# Patient Record
Sex: MALE | Race: WHITE | NOT HISPANIC OR LATINO | Employment: OTHER | URBAN - NONMETROPOLITAN AREA
[De-identification: names, ages, dates, MRNs, and addresses within clinical notes are randomized per-mention and may not be internally consistent; named-entity substitution may affect disease eponyms.]

---

## 2017-02-01 ENCOUNTER — TRANSCRIBE ORDERS (OUTPATIENT)
Dept: LAB | Facility: MEDICAL CENTER | Age: 82
End: 2017-02-01

## 2017-02-01 ENCOUNTER — APPOINTMENT (OUTPATIENT)
Dept: LAB | Facility: MEDICAL CENTER | Age: 82
End: 2017-02-01
Payer: MEDICARE

## 2017-02-01 DIAGNOSIS — I10 UNSPECIFIED ESSENTIAL HYPERTENSION: ICD-10-CM

## 2017-02-01 DIAGNOSIS — E78.5 HYPERLIPIDEMIA, UNSPECIFIED HYPERLIPIDEMIA TYPE: ICD-10-CM

## 2017-02-01 DIAGNOSIS — E55.9 VITAMIN D DEFICIENCY DISEASE: ICD-10-CM

## 2017-02-01 DIAGNOSIS — E03.9 HYPOTHYROIDISM, UNSPECIFIED TYPE: ICD-10-CM

## 2017-02-01 DIAGNOSIS — I10 UNSPECIFIED ESSENTIAL HYPERTENSION: Primary | ICD-10-CM

## 2017-02-01 LAB
25(OH)D3 SERPL-MCNC: 44 NG/ML (ref 30–100)
ALBUMIN SERPL BCP-MCNC: 3.7 G/DL (ref 3.5–5)
ALP SERPL-CCNC: 68 U/L (ref 46–116)
ALT SERPL W P-5'-P-CCNC: 23 U/L (ref 12–78)
ANION GAP SERPL CALCULATED.3IONS-SCNC: 9 MMOL/L (ref 4–13)
AST SERPL W P-5'-P-CCNC: 12 U/L (ref 5–45)
BASOPHILS # BLD AUTO: 0.03 THOUSANDS/ΜL (ref 0–0.1)
BASOPHILS NFR BLD AUTO: 0 % (ref 0–1)
BILIRUB SERPL-MCNC: 0.72 MG/DL (ref 0.2–1)
BUN SERPL-MCNC: 11 MG/DL (ref 5–25)
CALCIUM SERPL-MCNC: 9 MG/DL (ref 8.3–10.1)
CHLORIDE SERPL-SCNC: 107 MMOL/L (ref 100–108)
CHOLEST SERPL-MCNC: 136 MG/DL (ref 50–200)
CO2 SERPL-SCNC: 26 MMOL/L (ref 21–32)
CREAT SERPL-MCNC: 0.99 MG/DL (ref 0.6–1.3)
EOSINOPHIL # BLD AUTO: 0.81 THOUSAND/ΜL (ref 0–0.61)
EOSINOPHIL NFR BLD AUTO: 12 % (ref 0–6)
ERYTHROCYTE [DISTWIDTH] IN BLOOD BY AUTOMATED COUNT: 13.1 % (ref 11.6–15.1)
EST. AVERAGE GLUCOSE BLD GHB EST-MCNC: 117 MG/DL
GFR SERPL CREATININE-BSD FRML MDRD: >60 ML/MIN/1.73SQ M
GLUCOSE SERPL-MCNC: 93 MG/DL (ref 65–140)
HBA1C MFR BLD: 5.7 % (ref 4.2–6.3)
HCT VFR BLD AUTO: 43.9 % (ref 36.5–49.3)
HDLC SERPL-MCNC: 57 MG/DL (ref 40–60)
HGB BLD-MCNC: 15 G/DL (ref 12–17)
LDLC SERPL CALC-MCNC: 61 MG/DL (ref 0–100)
LYMPHOCYTES # BLD AUTO: 2.55 THOUSANDS/ΜL (ref 0.6–4.47)
LYMPHOCYTES NFR BLD AUTO: 37 % (ref 14–44)
MCH RBC QN AUTO: 29.9 PG (ref 26.8–34.3)
MCHC RBC AUTO-ENTMCNC: 34.2 G/DL (ref 31.4–37.4)
MCV RBC AUTO: 88 FL (ref 82–98)
MONOCYTES # BLD AUTO: 0.62 THOUSAND/ΜL (ref 0.17–1.22)
MONOCYTES NFR BLD AUTO: 9 % (ref 4–12)
NEUTROPHILS # BLD AUTO: 2.86 THOUSANDS/ΜL (ref 1.85–7.62)
NEUTS SEG NFR BLD AUTO: 42 % (ref 43–75)
NRBC BLD AUTO-RTO: 0 /100 WBCS
PLATELET # BLD AUTO: 204 THOUSANDS/UL (ref 149–390)
PMV BLD AUTO: 11.5 FL (ref 8.9–12.7)
POTASSIUM SERPL-SCNC: 3.8 MMOL/L (ref 3.5–5.3)
PROT SERPL-MCNC: 7.7 G/DL (ref 6.4–8.2)
RBC # BLD AUTO: 5.02 MILLION/UL (ref 3.88–5.62)
SODIUM SERPL-SCNC: 142 MMOL/L (ref 136–145)
TRIGL SERPL-MCNC: 91 MG/DL
TSH SERPL DL<=0.05 MIU/L-ACNC: 4.39 UIU/ML (ref 0.36–3.74)
WBC # BLD AUTO: 6.88 THOUSAND/UL (ref 4.31–10.16)

## 2017-02-01 PROCEDURE — 80061 LIPID PANEL: CPT

## 2017-02-01 PROCEDURE — 84443 ASSAY THYROID STIM HORMONE: CPT

## 2017-02-01 PROCEDURE — 80053 COMPREHEN METABOLIC PANEL: CPT

## 2017-02-01 PROCEDURE — 85025 COMPLETE CBC W/AUTO DIFF WBC: CPT

## 2017-02-01 PROCEDURE — 83036 HEMOGLOBIN GLYCOSYLATED A1C: CPT

## 2017-02-01 PROCEDURE — 36415 COLL VENOUS BLD VENIPUNCTURE: CPT

## 2017-02-01 PROCEDURE — 82306 VITAMIN D 25 HYDROXY: CPT

## 2017-07-15 ENCOUNTER — HOSPITAL ENCOUNTER (EMERGENCY)
Facility: HOSPITAL | Age: 82
Discharge: HOME | End: 2017-07-15
Attending: EMERGENCY MEDICINE | Admitting: EMERGENCY MEDICINE
Payer: MEDICARE

## 2017-07-15 ENCOUNTER — APPOINTMENT (EMERGENCY)
Dept: RADIOLOGY | Facility: HOSPITAL | Age: 82
End: 2017-07-15
Payer: MEDICARE

## 2017-07-15 VITALS
RESPIRATION RATE: 17 BRPM | WEIGHT: 243.17 LBS | BODY MASS INDEX: 36.02 KG/M2 | SYSTOLIC BLOOD PRESSURE: 132 MMHG | DIASTOLIC BLOOD PRESSURE: 62 MMHG | OXYGEN SATURATION: 96 % | HEIGHT: 69 IN | HEART RATE: 72 BPM | TEMPERATURE: 98.6 F

## 2017-07-15 DIAGNOSIS — K59.00 CONSTIPATION: Primary | ICD-10-CM

## 2017-07-15 PROCEDURE — 74022 RADEX COMPL AQT ABD SERIES: CPT

## 2017-07-15 PROCEDURE — 99283 EMERGENCY DEPT VISIT LOW MDM: CPT

## 2017-07-15 RX ORDER — CLORAZEPATE DIPOTASSIUM 7.5 MG/1
7.5 TABLET ORAL 3 TIMES DAILY
COMMUNITY
End: 2019-09-04

## 2017-07-15 RX ORDER — MAGNESIUM CARB/ALUMINUM HYDROX 105-160MG
296 TABLET,CHEWABLE ORAL ONCE
Status: COMPLETED | OUTPATIENT
Start: 2017-07-15 | End: 2017-07-15

## 2017-07-15 RX ORDER — DIPHENOXYLATE HCL/ATROPINE 2.5-.025/5
5 LIQUID (ML) ORAL 4 TIMES DAILY PRN
COMMUNITY
End: 2017-10-26 | Stop reason: ALTCHOICE

## 2017-07-15 RX ORDER — DOCUSATE SODIUM 100 MG/1
100 CAPSULE, LIQUID FILLED ORAL EVERY 12 HOURS
Qty: 60 CAPSULE | Refills: 0 | Status: SHIPPED | OUTPATIENT
Start: 2017-07-15

## 2017-07-15 RX ADMIN — MAGESIUM CITRATE 296 ML: 1.75 LIQUID ORAL at 08:54

## 2017-08-04 ENCOUNTER — TRANSCRIBE ORDERS (OUTPATIENT)
Dept: LAB | Facility: MEDICAL CENTER | Age: 82
End: 2017-08-04

## 2017-08-04 ENCOUNTER — APPOINTMENT (OUTPATIENT)
Dept: LAB | Facility: MEDICAL CENTER | Age: 82
End: 2017-08-04
Payer: MEDICARE

## 2017-08-04 DIAGNOSIS — I11.9 HYPERTENSIVE ARTERIOSCLEROTIC CARDIOVASCULAR DISEASE: ICD-10-CM

## 2017-08-04 DIAGNOSIS — E03.9 HYPOTHYROIDISM, UNSPECIFIED TYPE: ICD-10-CM

## 2017-08-04 DIAGNOSIS — E55.9 VITAMIN D DEFICIENCY: ICD-10-CM

## 2017-08-04 DIAGNOSIS — I11.9 HYPERTENSIVE ARTERIOSCLEROTIC CARDIOVASCULAR DISEASE: Primary | ICD-10-CM

## 2017-08-04 DIAGNOSIS — E78.5 HYPERLIPIDEMIA, UNSPECIFIED HYPERLIPIDEMIA TYPE: ICD-10-CM

## 2017-08-04 LAB
25(OH)D3 SERPL-MCNC: 44.2 NG/ML (ref 30–100)
ALBUMIN SERPL BCP-MCNC: 3.5 G/DL (ref 3.5–5)
ALP SERPL-CCNC: 64 U/L (ref 46–116)
ALT SERPL W P-5'-P-CCNC: 21 U/L (ref 12–78)
ANION GAP SERPL CALCULATED.3IONS-SCNC: 8 MMOL/L (ref 4–13)
AST SERPL W P-5'-P-CCNC: 12 U/L (ref 5–45)
BASOPHILS # BLD AUTO: 0.02 THOUSANDS/ΜL (ref 0–0.1)
BASOPHILS NFR BLD AUTO: 0 % (ref 0–1)
BILIRUB SERPL-MCNC: 0.88 MG/DL (ref 0.2–1)
BUN SERPL-MCNC: 11 MG/DL (ref 5–25)
CALCIUM SERPL-MCNC: 9 MG/DL (ref 8.3–10.1)
CHLORIDE SERPL-SCNC: 103 MMOL/L (ref 100–108)
CHOLEST SERPL-MCNC: 132 MG/DL (ref 50–200)
CO2 SERPL-SCNC: 28 MMOL/L (ref 21–32)
CREAT SERPL-MCNC: 0.88 MG/DL (ref 0.6–1.3)
EOSINOPHIL # BLD AUTO: 0.42 THOUSAND/ΜL (ref 0–0.61)
EOSINOPHIL NFR BLD AUTO: 7 % (ref 0–6)
ERYTHROCYTE [DISTWIDTH] IN BLOOD BY AUTOMATED COUNT: 13.6 % (ref 11.6–15.1)
GFR SERPL CREATININE-BSD FRML MDRD: 80 ML/MIN/1.73SQ M
GLUCOSE P FAST SERPL-MCNC: 92 MG/DL (ref 65–99)
HCT VFR BLD AUTO: 43.6 % (ref 36.5–49.3)
HGB BLD-MCNC: 14.5 G/DL (ref 12–17)
LDLC SERPL DIRECT ASSAY-MCNC: 69 MG/DL (ref 0–100)
LYMPHOCYTES # BLD AUTO: 1.85 THOUSANDS/ΜL (ref 0.6–4.47)
LYMPHOCYTES NFR BLD AUTO: 33 % (ref 14–44)
MCH RBC QN AUTO: 29.2 PG (ref 26.8–34.3)
MCHC RBC AUTO-ENTMCNC: 33.3 G/DL (ref 31.4–37.4)
MCV RBC AUTO: 88 FL (ref 82–98)
MONOCYTES # BLD AUTO: 0.46 THOUSAND/ΜL (ref 0.17–1.22)
MONOCYTES NFR BLD AUTO: 8 % (ref 4–12)
NEUTROPHILS # BLD AUTO: 2.9 THOUSANDS/ΜL (ref 1.85–7.62)
NEUTS SEG NFR BLD AUTO: 52 % (ref 43–75)
NRBC BLD AUTO-RTO: 0 /100 WBCS
PLATELET # BLD AUTO: 216 THOUSANDS/UL (ref 149–390)
PMV BLD AUTO: 11.3 FL (ref 8.9–12.7)
POTASSIUM SERPL-SCNC: 3.7 MMOL/L (ref 3.5–5.3)
PROT SERPL-MCNC: 7.1 G/DL (ref 6.4–8.2)
RBC # BLD AUTO: 4.96 MILLION/UL (ref 3.88–5.62)
SODIUM SERPL-SCNC: 139 MMOL/L (ref 136–145)
TSH SERPL DL<=0.05 MIU/L-ACNC: 4.17 UIU/ML (ref 0.36–3.74)
WBC # BLD AUTO: 5.66 THOUSAND/UL (ref 4.31–10.16)

## 2017-08-04 PROCEDURE — 36415 COLL VENOUS BLD VENIPUNCTURE: CPT

## 2017-08-04 PROCEDURE — 83721 ASSAY OF BLOOD LIPOPROTEIN: CPT

## 2017-08-04 PROCEDURE — 85025 COMPLETE CBC W/AUTO DIFF WBC: CPT

## 2017-08-04 PROCEDURE — 82306 VITAMIN D 25 HYDROXY: CPT

## 2017-08-04 PROCEDURE — 84443 ASSAY THYROID STIM HORMONE: CPT

## 2017-08-04 PROCEDURE — 80053 COMPREHEN METABOLIC PANEL: CPT

## 2017-08-04 PROCEDURE — 82465 ASSAY BLD/SERUM CHOLESTEROL: CPT

## 2017-10-26 ENCOUNTER — HOSPITAL ENCOUNTER (EMERGENCY)
Facility: HOSPITAL | Age: 82
Discharge: HOME | End: 2017-10-26
Attending: EMERGENCY MEDICINE | Admitting: EMERGENCY MEDICINE
Payer: MEDICARE

## 2017-10-26 ENCOUNTER — APPOINTMENT (EMERGENCY)
Dept: CT IMAGING | Facility: HOSPITAL | Age: 82
End: 2017-10-26
Payer: MEDICARE

## 2017-10-26 VITALS
WEIGHT: 249.78 LBS | BODY MASS INDEX: 36.89 KG/M2 | RESPIRATION RATE: 18 BRPM | SYSTOLIC BLOOD PRESSURE: 154 MMHG | HEART RATE: 76 BPM | DIASTOLIC BLOOD PRESSURE: 68 MMHG | TEMPERATURE: 98.4 F | OXYGEN SATURATION: 95 %

## 2017-10-26 DIAGNOSIS — R42 DIZZINESS: Primary | ICD-10-CM

## 2017-10-26 DIAGNOSIS — R11.0 NAUSEA WITHOUT VOMITING: ICD-10-CM

## 2017-10-26 LAB
ALBUMIN SERPL BCP-MCNC: 3.9 G/DL (ref 3.5–5)
ALP SERPL-CCNC: 58 U/L (ref 46–116)
ALT SERPL W P-5'-P-CCNC: 22 U/L (ref 12–78)
ANION GAP SERPL CALCULATED.3IONS-SCNC: 10 MMOL/L (ref 4–13)
AST SERPL W P-5'-P-CCNC: 14 U/L (ref 5–45)
ATRIAL RATE: 82 BPM
BASOPHILS # BLD AUTO: 0.01 THOUSANDS/ΜL (ref 0–0.1)
BASOPHILS NFR BLD AUTO: 0 % (ref 0–1)
BILIRUB SERPL-MCNC: 1 MG/DL (ref 0.2–1)
BUN SERPL-MCNC: 12 MG/DL (ref 5–25)
CALCIUM SERPL-MCNC: 8.9 MG/DL (ref 8.3–10.1)
CHLORIDE SERPL-SCNC: 103 MMOL/L (ref 100–108)
CO2 SERPL-SCNC: 28 MMOL/L (ref 21–32)
CREAT SERPL-MCNC: 1 MG/DL (ref 0.6–1.3)
EOSINOPHIL # BLD AUTO: 0.34 THOUSAND/ΜL (ref 0–0.61)
EOSINOPHIL NFR BLD AUTO: 6 % (ref 0–6)
ERYTHROCYTE [DISTWIDTH] IN BLOOD BY AUTOMATED COUNT: 13.3 % (ref 11.6–15.1)
GFR SERPL CREATININE-BSD FRML MDRD: 70 ML/MIN/1.73SQ M
GLUCOSE SERPL-MCNC: 113 MG/DL (ref 65–140)
HCT VFR BLD AUTO: 43.2 % (ref 36.5–49.3)
HGB BLD-MCNC: 14.8 G/DL (ref 12–17)
LYMPHOCYTES # BLD AUTO: 1.64 THOUSANDS/ΜL (ref 0.6–4.47)
LYMPHOCYTES NFR BLD AUTO: 27 % (ref 14–44)
MCH RBC QN AUTO: 29.7 PG (ref 26.8–34.3)
MCHC RBC AUTO-ENTMCNC: 34.3 G/DL (ref 31.4–37.4)
MCV RBC AUTO: 87 FL (ref 82–98)
MONOCYTES # BLD AUTO: 0.56 THOUSAND/ΜL (ref 0.17–1.22)
MONOCYTES NFR BLD AUTO: 9 % (ref 4–12)
NEUTROPHILS # BLD AUTO: 3.52 THOUSANDS/ΜL (ref 1.85–7.62)
NEUTS SEG NFR BLD AUTO: 58 % (ref 43–75)
P AXIS: 63 DEGREES
PLATELET # BLD AUTO: 201 THOUSANDS/UL (ref 149–390)
PMV BLD AUTO: 10.3 FL (ref 8.9–12.7)
POTASSIUM SERPL-SCNC: 3.3 MMOL/L (ref 3.5–5.3)
PR INTERVAL: 212 MS
PROT SERPL-MCNC: 7.4 G/DL (ref 6.4–8.2)
QRS AXIS: -55 DEGREES
QRSD INTERVAL: 138 MS
QT INTERVAL: 364 MS
QTC INTERVAL: 425 MS
RBC # BLD AUTO: 4.99 MILLION/UL (ref 3.88–5.62)
SODIUM SERPL-SCNC: 141 MMOL/L (ref 136–145)
T WAVE AXIS: 58 DEGREES
TROPONIN I SERPL-MCNC: <0.02 NG/ML
VENTRICULAR RATE: 82 BPM
WBC # BLD AUTO: 6.07 THOUSAND/UL (ref 4.31–10.16)

## 2017-10-26 PROCEDURE — 80053 COMPREHEN METABOLIC PANEL: CPT | Performed by: EMERGENCY MEDICINE

## 2017-10-26 PROCEDURE — 84484 ASSAY OF TROPONIN QUANT: CPT | Performed by: EMERGENCY MEDICINE

## 2017-10-26 PROCEDURE — 36415 COLL VENOUS BLD VENIPUNCTURE: CPT | Performed by: EMERGENCY MEDICINE

## 2017-10-26 PROCEDURE — 85025 COMPLETE CBC W/AUTO DIFF WBC: CPT | Performed by: EMERGENCY MEDICINE

## 2017-10-26 PROCEDURE — 93005 ELECTROCARDIOGRAM TRACING: CPT | Performed by: EMERGENCY MEDICINE

## 2017-10-26 PROCEDURE — 70450 CT HEAD/BRAIN W/O DYE: CPT

## 2017-10-26 PROCEDURE — 99284 EMERGENCY DEPT VISIT MOD MDM: CPT

## 2017-10-26 PROCEDURE — 96374 THER/PROPH/DIAG INJ IV PUSH: CPT

## 2017-10-26 RX ORDER — ONDANSETRON 2 MG/ML
4 INJECTION INTRAMUSCULAR; INTRAVENOUS ONCE
Status: COMPLETED | OUTPATIENT
Start: 2017-10-26 | End: 2017-10-26

## 2017-10-26 RX ORDER — POTASSIUM CHLORIDE 20 MEQ/1
40 TABLET, EXTENDED RELEASE ORAL ONCE
Status: COMPLETED | OUTPATIENT
Start: 2017-10-26 | End: 2017-10-26

## 2017-10-26 RX ADMIN — ONDANSETRON 4 MG: 2 INJECTION INTRAMUSCULAR; INTRAVENOUS at 05:25

## 2017-10-26 RX ADMIN — POTASSIUM CHLORIDE 40 MEQ: 1500 TABLET, EXTENDED RELEASE ORAL at 06:00

## 2017-10-26 NOTE — ED PROVIDER NOTES
History  Chief Complaint   Patient presents with    Dizziness      Patient stated he has some dizziness and nausea for the past few days  Patient then woke suddenly tonight not feeling right  Patients concerned about paint fumes in apartment  Patient denies SOB or chest pain at this time  Patient presents with his son for complaint of dizziness and nausea for the past 2-3 days  Patient is convinced he is being affected by paint fumes from the apartment above his  The apartment building maintenance staff have been in his room and state they barely smelled anything  His son also states that he could not smell any paint fumes in the apartment tonight  The patient has been in and out of the apartment into fresh air as well as having opened 2 windows in his apartment without change in symptoms  The son further states that he and his siblings are concerned that there is either dementia or more psychological problems occurring as he has been complaining of unfounded pains and other odd behaviors  The son called the patient's PCP yesterday in an attempt to schedule an appointment  Patient states that he neither feels room spinning dizziness nor like he is going to pass out but just that he feels unsteady  He states that he had some nausea but never vomited and has had no abdominal pain or diarrhea  He has been eating and drinking as usual without change in his symptoms  He is taking his medications as prescribed and denies any recent changes  The prescriptions he brought with them were filled 1 September 2017  He has no apparent h/o stroke, TIA or vertigo  No other cerebellar complaints  Denies f/c, HA, CP, SOB, abdominal pain, v/d  12 system ROS o/w negative            History provided by:  Patient, medical records and relative  Dizziness   Quality:  Imbalance  Severity:  Mild  Onset quality:  Unable to specify  Duration:  3 days  Timing:  Constant  Progression:  Waxing and waning  Chronicity: New  Context: not when bending over, not with bowel movement, not with ear pain, not with eye movement, not with head movement, not with inactivity, not with loss of consciousness, not with medication, not with physical activity, not when standing up and not when urinating    Relieved by:  None tried  Worsened by:  Nothing  Ineffective treatments:  None tried  Associated symptoms: nausea    Associated symptoms: no blood in stool, no chest pain, no diarrhea, no headaches, no palpitations, no shortness of breath, no syncope, no vision changes, no vomiting and no weakness    Risk factors: multiple medications    Risk factors: no anemia, no heart disease, no hx of stroke and no hx of vertigo        Prior to Admission Medications   Prescriptions Last Dose Informant Patient Reported? Taking?   acetaminophen (TYLENOL) 325 mg tablet   No No   Sig: Take 2 tablets by mouth every 6 (six) hours as needed for mild pain or fever   capsaicin (ZOSTRIX) 0 025 % cream   No No   Sig: Apply 1 application topically 2 (two) times a day for 30 days   clorazepate (TRANXENE) 7 5 mg tablet   Yes Yes   Sig: Take 7 5 mg by mouth 3 (three) times a day   docusate sodium (COLACE) 100 mg capsule   No No   Sig: Take 1 capsule by mouth every 12 (twelve) hours   doxepin (SINEquan) 10 mg capsule   Yes Yes   Sig: Take 10 mg by mouth daily at bedtime   finasteride (PROSCAR) 5 mg tablet   Yes Yes   Sig: Take 5 mg by mouth daily   simvastatin (ZOCOR) 40 mg tablet   Yes Yes   Sig: Take 40 mg by mouth daily at bedtime   tamsulosin (FLOMAX) 0 4 mg   Yes Yes   Sig: Take 0 4 mg by mouth daily with dinner      Facility-Administered Medications: None       Past Medical History:   Diagnosis Date    Enlarged prostate     Hyperlipidemia        Past Surgical History:   Procedure Laterality Date    KNEE SURGERY         History reviewed  No pertinent family history  I have reviewed and agree with the history as documented      Social History   Substance Use Topics  Smoking status: Former Smoker    Smokeless tobacco: Never Used    Alcohol use No        Review of Systems   Constitutional: Negative for chills, diaphoresis and fever  HENT: Negative for rhinorrhea, sore throat and trouble swallowing  Respiratory: Negative for cough, chest tightness, shortness of breath and wheezing  Cardiovascular: Negative for chest pain, palpitations, leg swelling and syncope  Gastrointestinal: Positive for nausea  Negative for abdominal distention, abdominal pain, blood in stool, constipation, diarrhea and vomiting  Genitourinary: Negative for difficulty urinating, dysuria, flank pain, frequency, hematuria and urgency  Musculoskeletal: Negative for arthralgias, back pain, myalgias, neck pain and neck stiffness  Skin: Negative for pallor and rash  Neurological: Negative for weakness, light-headedness and headaches  Feels unsteady or off-balance   Hematological: Negative for adenopathy  Psychiatric/Behavioral: Negative for confusion  The patient is not nervous/anxious  All other systems reviewed and are negative  Physical Exam  ED Triage Vitals [10/26/17 0458]   Temperature Pulse Respirations Blood Pressure SpO2   98 4 °F (36 9 °C) 88 20 (!) 178/85 96 %      Temp Source Heart Rate Source Patient Position - Orthostatic VS BP Location FiO2 (%)   Temporal Monitor Lying Left arm --      Pain Score       --           Orthostatic Vital Signs  Vitals:    10/26/17 0528 10/26/17 0529 10/26/17 0530 10/26/17 0545   BP: 159/72 144/59 157/70    Pulse: 76 85 78 77   Patient Position - Orthostatic VS: Lying - Orthostatic VS Sitting - Orthostatic VS Standing - Orthostatic VS        Physical Exam   Constitutional: He is oriented to person, place, and time  He appears well-developed and well-nourished  No distress  HENT:   Head: Normocephalic and atraumatic     Right Ear: External ear normal    Left Ear: External ear normal    Mouth/Throat: Oropharynx is clear and moist  No oropharyngeal exudate  Eyes: Conjunctivae and EOM are normal  Pupils are equal, round, and reactive to light  Neck: Normal range of motion  Neck supple  Cardiovascular: Normal rate and regular rhythm  No murmur heard  Pulmonary/Chest: Effort normal and breath sounds normal    Abdominal: Soft  Bowel sounds are normal  He exhibits no distension  There is no tenderness  Musculoskeletal: Normal range of motion  He exhibits no edema or tenderness  Lymphadenopathy:     He has no cervical adenopathy  Neurological: He is alert and oriented to person, place, and time  He has normal reflexes  No cranial nerve deficit  Skin: Skin is warm and dry  Capillary refill takes less than 2 seconds  No rash noted  He is not diaphoretic  No erythema  No pallor  Psychiatric: He has a normal mood and affect  His behavior is normal  Judgment and thought content normal    Vitals reviewed  ED Medications  Medications   ondansetron (ZOFRAN) injection 4 mg (4 mg Intravenous Given 10/26/17 0525)   potassium chloride (K-DUR,KLOR-CON) CR tablet 40 mEq (40 mEq Oral Given 10/26/17 0600)       Diagnostic Studies  Results Reviewed     Procedure Component Value Units Date/Time    Troponin I [[id number]]  (Normal) Collected:  10/26/17 0525    Lab Status:  Final result Specimen:  Blood from Arm, Right Updated:  10/26/17 0549     Troponin I <0 02 ng/mL     Narrative:         [employer] Chemistry analyzer 99% cutoff is > 0 04 ng/mL in network labs    o cTnI 99% cutoff is useful only when applied to patients in the clinical setting of myocardial ischemia  o cTnI 99% cutoff should be interpreted in the context of clinical history, ECG findings and possibly cardiac imaging to establish correct diagnosis  o cTnI 99% cutoff may be suggestive but clearly not indicative of a coronary event without the clinical setting of myocardial ischemia      Comprehensive metabolic panel [[id number]]  (Abnormal) Collected:  10/26/17 0525    Lab Status: Final result Specimen:  Blood from Arm, Right Updated:  10/26/17 0545     Sodium 141 mmol/L      Potassium 3 3 (L) mmol/L      Chloride 103 mmol/L      CO2 28 mmol/L      Anion Gap 10 mmol/L      BUN 12 mg/dL      Creatinine 1 00 mg/dL      Glucose 113 mg/dL      Calcium 8 9 mg/dL      AST 14 U/L      ALT 22 U/L      Alkaline Phosphatase 58 U/L      Total Protein 7 4 g/dL      Albumin 3 9 g/dL      Total Bilirubin 1 00 mg/dL      eGFR 70 ml/min/1 73sq m     Narrative:         National Kidney Disease Education Program recommendations are as follows:  GFR calculation is accurate only with a steady state creatinine  Chronic Kidney disease less than 60 ml/min/1 73 sq  meters  Kidney failure less than 15 ml/min/1 73 sq  meters  CBC and differential [[id number]]  (Normal) Collected:  10/26/17 0525    Lab Status:  Final result Specimen:  Blood from Arm, Right Updated:  10/26/17 0530     WBC 6 07 Thousand/uL      RBC 4 99 Million/uL      Hemoglobin 14 8 g/dL      Hematocrit 43 2 %      MCV 87 fL      MCH 29 7 pg      MCHC 34 3 g/dL      RDW 13 3 %      MPV 10 3 fL      Platelets 538 Thousands/uL      Neutrophils Relative 58 %      Lymphocytes Relative 27 %      Monocytes Relative 9 %      Eosinophils Relative 6 %      Basophils Relative 0 %      Neutrophils Absolute 3 52 Thousands/µL      Lymphocytes Absolute 1 64 Thousands/µL      Monocytes Absolute 0 56 Thousand/µL      Eosinophils Absolute 0 34 Thousand/µL      Basophils Absolute 0 01 Thousands/µL                  CT head without contrast   Final Result by Tremaine Hernandez MD (10/26 7429)      No acute intracranial abnormality  Atrophy  Chronic sinusitis  Workstation performed: [id number]                    Procedures  Procedures       Phone Contacts  ED Phone Contact    ED Course  ED Course as of Oct 26 0627   Thu Oct 26, 2017   1560 Was 3 7 two months ago  Will replete    Potassium: (!) 3 5 7135 Patient's sons request a CT head stating their sister is a neuropsychologist and wants to know if his olfactory center is affected since he is reporting odors that no one else smells  I explained that a CT for the clinical findings is extremely low yield for definitive findings/diagnoses  Despite this, they repeatedly request the CT      5772 Results reviewed with patient and sons  Will follow up with PCP  HEART Risk Score    Flowsheet Row Most Recent Value   History  0 Filed at: 10/26/2017 0518   ECG  1 Filed at: 10/26/2017 0518   Age  2 Filed at: 10/26/2017 0518   Risk Factors  1 Filed at: 10/26/2017 0518   Troponin  0 Filed at: 10/26/2017 0518   Heart Score Risk Calculator   History  0 Filed at: 10/26/2017 0518   ECG  1 Filed at: 10/26/2017 0518   Age  2 Filed at: 10/26/2017 0518   Risk Factors  1 Filed at: 10/26/2017 0518   Troponin  0 Filed at: 10/26/2017 0518   HEART Score  4 Filed at: 10/26/2017 0518   HEART Score  4 Filed at: 10/26/2017 0518                            MDM  Number of Diagnoses or Management Options  Diagnosis management comments:  DDx: Unsteadiness - vertigo, otoliths, viral URI, abnormal electrolytes, doubt ACS/MI, dysrhythmia or stroke  A/P: Will check cardiac w/u, treat symptoms, reevaluate for further work up and disposition         Amount and/or Complexity of Data Reviewed  Clinical lab tests: ordered and reviewed  Tests in the radiology section of CPT®: ordered and reviewed  Obtain history from someone other than the patient: yes (His son)  Review and summarize past medical records: yes      CritCare Time    Disposition  Final diagnoses:   Dizziness   Nausea without vomiting     Time reflects when diagnosis was documented in both MDM as applicable and the Disposition within this note     Time User Action Codes Description Comment    10/26/2017  6:26 AM Lucia Sicard Add [R42] Dizziness     10/26/2017  6:26 AM Td Viveros Add [R11 0] Nausea without vomiting       ED Disposition     ED Disposition Condition Comment    Discharge  Queta Abrams GUILLERMO Bailey Sr  discharge to home/self care  Condition at discharge: Stable        Follow-up Information     Follow up With Specialties Details Why [facility],  Family Medicine Schedule an appointment as soon as possible for a visit for further evaluation [address] [place] [address]  [phone]          Patient's Medications   Discharge Prescriptions    No medications on file     No discharge procedures on file      ED Provider  Electronically Signed by           Valerie Lamar DO  10/26/17 7958

## 2017-10-26 NOTE — ED PROCEDURE NOTE
PROCEDURE  ECG 12 Lead Documentation  Date/Time: 10/26/2017 5:14 AM  Performed by: [employer]  Authorized by: [employer]     Indications / Diagnosis:  Dizziness  Patient location:  ED  Previous ECG:     Comparison to cardiac monitor: Yes    Interpretation:     Interpretation: non-specific    Quality:     Tracing quality:  Limited by artifact  Rate:     ECG rate:  82    ECG rate assessment: normal    Rhythm:     Rhythm: sinus rhythm    Ectopy:     Ectopy: none    Conduction:     Conduction: abnormal      Abnormal conduction: complete RBBB and 1st degree    ST segments:     ST segments:  Non-specific  T waves:     T waves: normal

## 2017-11-06 ENCOUNTER — TRANSCRIBE ORDERS (OUTPATIENT)
Dept: SLEEP CENTER | Facility: HOSPITAL | Age: 82
End: 2017-11-06

## 2017-11-06 DIAGNOSIS — G47.33 OBSTRUCTIVE SLEEP APNEA: Primary | ICD-10-CM

## 2017-11-30 ENCOUNTER — TRANSCRIBE ORDERS (OUTPATIENT)
Dept: SLEEP CENTER | Facility: HOSPITAL | Age: 82
End: 2017-11-30

## 2017-11-30 DIAGNOSIS — G47.33 OBSTRUCTIVE SLEEP APNEA: Primary | ICD-10-CM

## 2017-12-21 ENCOUNTER — TRANSCRIBE ORDERS (OUTPATIENT)
Dept: SLEEP CENTER | Facility: HOSPITAL | Age: 82
End: 2017-12-21

## 2017-12-21 ENCOUNTER — HOSPITAL ENCOUNTER (OUTPATIENT)
Dept: SLEEP CENTER | Facility: HOSPITAL | Age: 82
Discharge: HOME | End: 2017-12-22
Payer: MEDICARE

## 2017-12-21 DIAGNOSIS — G47.33 OBSTRUCTIVE SLEEP APNEA: Primary | ICD-10-CM

## 2017-12-21 DIAGNOSIS — G47.33 OBSTRUCTIVE SLEEP APNEA: ICD-10-CM

## 2017-12-21 NOTE — PROGRESS NOTES
Consultation - [address] [place] [address] [address]  80 y o  male  PHM:[birth year]  LKV:[id number]    Physician Requesting Consult: Benedict Litten, DO     Reason for Consult : At your kind request I saw this patient for initial sleep evaluation today  The patient is here with his son to evaluate for suspected Obstructive Sleep Apnea  Medications, Past, Family and Social Histories were reviewed  Comorbidities include:  Dyslipidemia, bladder outlet obstruction, hypertension and pre dementia  He is on several psychotropics  Herewith findings in summary  Full details are available from our records  HPI:  He has been told that he snores in the past   Presently he sleeps alone  He states he is unable to breathe while supine and habitually sleeps on his side  He reports awakening sensing the smell of paint and feels like I want to spit  He is not aware of breathing difficulties during sleep  He reported no features of restless leg syndrome or parasomnia  Sleep Routine:  He is spending 9 hours in bed  He typically falls asleep in 30 minutes  Sleep is interrupted around 2 times a night and he has difficulty falling back asleep  He awakens spontaneously feeling rested but estimates he is only getting around 5 hours sleep  He rated himself at 3/24 on the [place] Sleepiness Scale but notes that he nods off while watching TV during the daytime  Habits:   reports that he has quit smoking  He has never used smokeless tobacco ,  reports that he does not drink alcohol ,  reports that he does not use drugs  , he uses limited caffeine  ROS:  Weight has been stable  He denied nasal, respiratory, cardiac or gastrointestinal symptoms  A 12 point review of systems was otherwise unremarkable  Physical Exam: Salient findings on exam, are a body mass index 36  Vitals are stable  He appears somewhat confused at times but mood and affect are normal   Craniofacial anatomy is normal   He has a thick neck   There are no abnormal neck masses  Nasal airway is patent  Mucous membranes appeared normal  The oral airway is crowded  Base of tongue is at Modified Mallampati class IV  He is edentulous and has dentures in upper and lower jaws  Heart sounds are regular, there are no murmurs, no JVD or pedal edema  Respiratory effort is normal  Air entry is good bilaterally  There are no wheezes or rales  He has truncal obesity  The rest of his exam was unremarkable  Impression:   1  Probable obstructive sleep apnea  2  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  3  Hypersomnolence  4  Obesity   5  Claustrophobia  6  Comorbidities as outlined above    Plan:   1  With respect to above conditions, I counseled on pathophysiology, diagnosis, treatment options, risks and benefits; interrelationship and effects on symptoms and comorbidities; risks of no treatment; costs and insurance aspects  2  I advised on weight reduction, avoiding sleeping supine, using alcohol or sedating medications close to bed time and on safe driving practices  3  I did some  Cognitive behavioral therapy, advised on Sleep Hygiene and behavioral techniques to manage Insomnia  Specifically limiting his time in bed to less than 8 hours and on avoiding daytime naps  4  Nocturnal polysomnography is indicated and a diagnostic study will be scheduled  5  Patient is willing to try Positive airway pressure therapy and will be scheduled for a titration study if indicated  6  Follow-up will be scheduled after the studies to review results, further details of treatment options and to initiate/adjust therapy  Thank you for allowing me to participate in the care of this patient  I will keep you apprised of developments       Sincerely,    Jay Juárez MD  Board Certified Specialist

## 2018-01-20 ENCOUNTER — HOSPITAL ENCOUNTER (OUTPATIENT)
Dept: SLEEP CENTER | Facility: HOSPITAL | Age: 83
Discharge: HOME | End: 2018-01-20
Attending: INTERNAL MEDICINE
Payer: MEDICARE

## 2018-01-20 DIAGNOSIS — G47.33 OBSTRUCTIVE SLEEP APNEA: ICD-10-CM

## 2018-01-20 PROCEDURE — 95810 POLYSOM 6/> YRS 4/> PARAM: CPT

## 2018-02-09 ENCOUNTER — TRANSCRIBE ORDERS (OUTPATIENT)
Dept: RADIOLOGY | Facility: MEDICAL CENTER | Age: 83
End: 2018-02-09

## 2018-02-09 ENCOUNTER — APPOINTMENT (OUTPATIENT)
Dept: LAB | Facility: MEDICAL CENTER | Age: 83
End: 2018-02-09
Payer: MEDICARE

## 2018-02-09 DIAGNOSIS — E55.9 VITAMIN D DEFICIENCY DISEASE: ICD-10-CM

## 2018-02-09 DIAGNOSIS — I10 HYPERTENSION, UNSPECIFIED TYPE: Primary | ICD-10-CM

## 2018-02-09 DIAGNOSIS — E78.5 HYPERLIPIDEMIA, UNSPECIFIED HYPERLIPIDEMIA TYPE: ICD-10-CM

## 2018-02-09 DIAGNOSIS — E03.9 HYPOTHYROIDISM, UNSPECIFIED TYPE: ICD-10-CM

## 2018-02-09 DIAGNOSIS — I10 HYPERTENSION, UNSPECIFIED TYPE: ICD-10-CM

## 2018-02-09 LAB
25(OH)D3 SERPL-MCNC: 30.2 NG/ML (ref 30–100)
ALBUMIN SERPL BCP-MCNC: 3.7 G/DL (ref 3.5–5)
ALP SERPL-CCNC: 61 U/L (ref 46–116)
ALT SERPL W P-5'-P-CCNC: 19 U/L (ref 12–78)
ANION GAP SERPL CALCULATED.3IONS-SCNC: 6 MMOL/L (ref 4–13)
AST SERPL W P-5'-P-CCNC: 15 U/L (ref 5–45)
BASOPHILS # BLD AUTO: 0.03 THOUSANDS/ΜL (ref 0–0.1)
BASOPHILS NFR BLD AUTO: 1 % (ref 0–1)
BILIRUB SERPL-MCNC: 1.1 MG/DL (ref 0.2–1)
BUN SERPL-MCNC: 12 MG/DL (ref 5–25)
CALCIUM SERPL-MCNC: 8.8 MG/DL (ref 8.3–10.1)
CHLORIDE SERPL-SCNC: 105 MMOL/L (ref 100–108)
CHOLEST SERPL-MCNC: 122 MG/DL (ref 50–200)
CO2 SERPL-SCNC: 30 MMOL/L (ref 21–32)
CREAT SERPL-MCNC: 0.94 MG/DL (ref 0.6–1.3)
EOSINOPHIL # BLD AUTO: 0.51 THOUSAND/ΜL (ref 0–0.61)
EOSINOPHIL NFR BLD AUTO: 9 % (ref 0–6)
ERYTHROCYTE [DISTWIDTH] IN BLOOD BY AUTOMATED COUNT: 13.1 % (ref 11.6–15.1)
GFR SERPL CREATININE-BSD FRML MDRD: 75 ML/MIN/1.73SQ M
GLUCOSE P FAST SERPL-MCNC: 95 MG/DL (ref 65–99)
HCT VFR BLD AUTO: 43.1 % (ref 36.5–49.3)
HDLC SERPL-MCNC: 56 MG/DL (ref 40–60)
HGB BLD-MCNC: 14.9 G/DL (ref 12–17)
LDLC SERPL CALC-MCNC: 49 MG/DL (ref 0–100)
LYMPHOCYTES # BLD AUTO: 1.93 THOUSANDS/ΜL (ref 0.6–4.47)
LYMPHOCYTES NFR BLD AUTO: 35 % (ref 14–44)
MCH RBC QN AUTO: 30.3 PG (ref 26.8–34.3)
MCHC RBC AUTO-ENTMCNC: 34.6 G/DL (ref 31.4–37.4)
MCV RBC AUTO: 88 FL (ref 82–98)
MONOCYTES # BLD AUTO: 0.43 THOUSAND/ΜL (ref 0.17–1.22)
MONOCYTES NFR BLD AUTO: 8 % (ref 4–12)
NEUTROPHILS # BLD AUTO: 2.7 THOUSANDS/ΜL (ref 1.85–7.62)
NEUTS SEG NFR BLD AUTO: 47 % (ref 43–75)
NRBC BLD AUTO-RTO: 0 /100 WBCS
PLATELET # BLD AUTO: 152 THOUSANDS/UL (ref 149–390)
PMV BLD AUTO: 11.6 FL (ref 8.9–12.7)
POTASSIUM SERPL-SCNC: 3.6 MMOL/L (ref 3.5–5.3)
PROT SERPL-MCNC: 7.3 G/DL (ref 6.4–8.2)
RBC # BLD AUTO: 4.92 MILLION/UL (ref 3.88–5.62)
SODIUM SERPL-SCNC: 141 MMOL/L (ref 136–145)
TRIGL SERPL-MCNC: 83 MG/DL
TSH SERPL DL<=0.05 MIU/L-ACNC: 3.34 UIU/ML (ref 0.36–3.74)
WBC # BLD AUTO: 5.6 THOUSAND/UL (ref 4.31–10.16)

## 2018-02-09 PROCEDURE — 80061 LIPID PANEL: CPT

## 2018-02-09 PROCEDURE — 36415 COLL VENOUS BLD VENIPUNCTURE: CPT

## 2018-02-09 PROCEDURE — 85025 COMPLETE CBC W/AUTO DIFF WBC: CPT

## 2018-02-09 PROCEDURE — 82306 VITAMIN D 25 HYDROXY: CPT

## 2018-02-09 PROCEDURE — 84443 ASSAY THYROID STIM HORMONE: CPT

## 2018-02-09 PROCEDURE — 80053 COMPREHEN METABOLIC PANEL: CPT

## 2018-02-28 ENCOUNTER — TRANSCRIBE ORDERS (OUTPATIENT)
Dept: OTOLARYNGOLOGY | Facility: CLINIC | Age: 83
End: 2018-02-28

## 2018-02-28 DIAGNOSIS — G47.33 SEVERE OBSTRUCTIVE SLEEP APNEA: Primary | ICD-10-CM

## 2018-03-18 ENCOUNTER — HOSPITAL ENCOUNTER (OUTPATIENT)
Dept: SLEEP CENTER | Facility: HOSPITAL | Age: 83
Discharge: HOME | End: 2018-03-18
Attending: INTERNAL MEDICINE
Payer: MEDICARE

## 2018-03-18 PROCEDURE — 95811 POLYSOM 6/>YRS CPAP 4/> PARM: CPT

## 2018-03-19 NOTE — PROGRESS NOTES
Sleep Study Documentation    Pre-Sleep Study       Sleep testing procedure explained to patient:YES    Patient napped prior to study:YES- less than 30 minutes  Napped after 2PM: no    Caffeine:Dayshift worker after 12PM   Caffeine use:NO    Alcohol:Dayshift workers after 5PM: Alcohol use:NO    Typical day for patient:YES       Study Documentation  Treatment   Optimal PAP pressure: N/A  Leak:Medium  Snore:Not eliminated  REM Obtained:no  Supplemental O2: no    Minimum SaO2 85%  Baseline SaO2 95%  PAP mask tried (list all)[place], F20  PAP mask choice (final)Medium [place]   PAP pressure at which snoring was eliminated N/A  Minimum SaO2 at final PAP pressure 85%  Mode of Therapy:CPAP  ETCO2:No  CPAP changed to BiPAP:No    Mode of Therapy:CPAP    EKG abnormalities: no     EEG abnormalities: no    Study Terminated:no    Patient classification: retired       Post-Sleep Study    Medication used at bedtime or during sleep study:NO    Patient reports time it took to fall asleep:greater than 60 minutes    Patient reports waking up during study:3 or more times  Patient reports returning to sleep in greater than 30 minutes  Patient reports sleeping less than 2 hours without dreaming  Patient reports sleep during study:worse than usual    Patient rated sleepiness: Very sleepy or tired    PAP treatment:yes: Post PAP treatment patient reports feeling worse and  would wear PAP mask at home

## 2018-05-10 ENCOUNTER — TELEPHONE (OUTPATIENT)
Dept: SLEEP CENTER | Facility: CLINIC | Age: 83
End: 2018-05-10

## 2018-11-01 ENCOUNTER — APPOINTMENT (OUTPATIENT)
Dept: LAB | Facility: MEDICAL CENTER | Age: 83
End: 2018-11-01
Payer: MEDICARE

## 2018-11-01 ENCOUNTER — TRANSCRIBE ORDERS (OUTPATIENT)
Dept: ADMINISTRATIVE | Facility: HOSPITAL | Age: 83
End: 2018-11-01

## 2018-11-01 DIAGNOSIS — Z13.9 SCREENING FOR CONDITION: ICD-10-CM

## 2018-11-01 DIAGNOSIS — E55.9 VITAMIN D DEFICIENCY: Primary | ICD-10-CM

## 2018-11-01 DIAGNOSIS — E55.9 VITAMIN D DEFICIENCY: ICD-10-CM

## 2018-11-01 LAB
25(OH)D3 SERPL-MCNC: 34 NG/ML (ref 30–100)
ALBUMIN SERPL BCP-MCNC: 3.8 G/DL (ref 3.5–5)
ALP SERPL-CCNC: 62 U/L (ref 46–116)
ALT SERPL W P-5'-P-CCNC: 17 U/L (ref 12–78)
ANION GAP SERPL CALCULATED.3IONS-SCNC: 4 MMOL/L (ref 4–13)
AST SERPL W P-5'-P-CCNC: 10 U/L (ref 5–45)
BASOPHILS # BLD AUTO: 0.03 THOUSANDS/ΜL (ref 0–0.1)
BASOPHILS NFR BLD AUTO: 1 % (ref 0–1)
BILIRUB SERPL-MCNC: 0.99 MG/DL (ref 0.2–1)
BUN SERPL-MCNC: 13 MG/DL (ref 5–25)
CALCIUM SERPL-MCNC: 9 MG/DL (ref 8.3–10.1)
CHLORIDE SERPL-SCNC: 107 MMOL/L (ref 100–108)
CHOLEST SERPL-MCNC: 107 MG/DL (ref 50–200)
CO2 SERPL-SCNC: 28 MMOL/L (ref 21–32)
CREAT SERPL-MCNC: 0.86 MG/DL (ref 0.6–1.3)
EOSINOPHIL # BLD AUTO: 0.44 THOUSAND/ΜL (ref 0–0.61)
EOSINOPHIL NFR BLD AUTO: 8 % (ref 0–6)
ERYTHROCYTE [DISTWIDTH] IN BLOOD BY AUTOMATED COUNT: 12.6 % (ref 11.6–15.1)
GFR SERPL CREATININE-BSD FRML MDRD: 80 ML/MIN/1.73SQ M
GLUCOSE P FAST SERPL-MCNC: 90 MG/DL (ref 65–99)
HCT VFR BLD AUTO: 41.9 % (ref 36.5–49.3)
HGB BLD-MCNC: 14.3 G/DL (ref 12–17)
IMM GRANULOCYTES # BLD AUTO: 0.01 THOUSAND/UL (ref 0–0.2)
IMM GRANULOCYTES NFR BLD AUTO: 0 % (ref 0–2)
LDLC SERPL DIRECT ASSAY-MCNC: 45 MG/DL (ref 0–100)
LYMPHOCYTES # BLD AUTO: 1.81 THOUSANDS/ΜL (ref 0.6–4.47)
LYMPHOCYTES NFR BLD AUTO: 32 % (ref 14–44)
MCH RBC QN AUTO: 29.9 PG (ref 26.8–34.3)
MCHC RBC AUTO-ENTMCNC: 34.1 G/DL (ref 31.4–37.4)
MCV RBC AUTO: 88 FL (ref 82–98)
MONOCYTES # BLD AUTO: 0.4 THOUSAND/ΜL (ref 0.17–1.22)
MONOCYTES NFR BLD AUTO: 7 % (ref 4–12)
NEUTROPHILS # BLD AUTO: 3.01 THOUSANDS/ΜL (ref 1.85–7.62)
NEUTS SEG NFR BLD AUTO: 52 % (ref 43–75)
NRBC BLD AUTO-RTO: 0 /100 WBCS
PMV BLD AUTO: 11.2 FL (ref 8.9–12.7)
POTASSIUM SERPL-SCNC: 3.6 MMOL/L (ref 3.5–5.3)
PROT SERPL-MCNC: 7.1 G/DL (ref 6.4–8.2)
RBC # BLD AUTO: 4.79 MILLION/UL (ref 3.88–5.62)
SODIUM SERPL-SCNC: 139 MMOL/L (ref 136–145)
TSH SERPL DL<=0.05 MIU/L-ACNC: 2.88 UIU/ML (ref 0.36–3.74)
WBC # BLD AUTO: 5.7 THOUSAND/UL (ref 4.31–10.16)

## 2018-11-01 PROCEDURE — 83721 ASSAY OF BLOOD LIPOPROTEIN: CPT

## 2018-11-01 PROCEDURE — 85025 COMPLETE CBC W/AUTO DIFF WBC: CPT

## 2018-11-01 PROCEDURE — 80053 COMPREHEN METABOLIC PANEL: CPT

## 2018-11-01 PROCEDURE — 84443 ASSAY THYROID STIM HORMONE: CPT

## 2018-11-01 PROCEDURE — 82465 ASSAY BLD/SERUM CHOLESTEROL: CPT

## 2018-11-01 PROCEDURE — 36415 COLL VENOUS BLD VENIPUNCTURE: CPT

## 2018-11-01 PROCEDURE — 82306 VITAMIN D 25 HYDROXY: CPT

## 2019-07-01 ENCOUNTER — APPOINTMENT (OUTPATIENT)
Dept: LAB | Facility: MEDICAL CENTER | Age: 84
End: 2019-07-01
Payer: MEDICARE

## 2019-07-01 ENCOUNTER — TRANSCRIBE ORDERS (OUTPATIENT)
Dept: ADMINISTRATIVE | Facility: HOSPITAL | Age: 84
End: 2019-07-01

## 2019-07-01 DIAGNOSIS — E55.9 VITAMIN D DEFICIENCY: ICD-10-CM

## 2019-07-01 DIAGNOSIS — E78.5 HYPERLIPIDEMIA, UNSPECIFIED HYPERLIPIDEMIA TYPE: Primary | ICD-10-CM

## 2019-07-01 DIAGNOSIS — E78.5 HYPERLIPIDEMIA, UNSPECIFIED HYPERLIPIDEMIA TYPE: ICD-10-CM

## 2019-07-01 DIAGNOSIS — Z13.9 SCREENING FOR CONDITION: ICD-10-CM

## 2019-07-01 LAB
25(OH)D3 SERPL-MCNC: 30.1 NG/ML (ref 30–100)
ALBUMIN SERPL BCP-MCNC: 3.7 G/DL (ref 3.5–5)
ALP SERPL-CCNC: 61 U/L (ref 46–116)
ALT SERPL W P-5'-P-CCNC: 19 U/L (ref 12–78)
ANION GAP SERPL CALCULATED.3IONS-SCNC: 5 MMOL/L (ref 4–13)
AST SERPL W P-5'-P-CCNC: 12 U/L (ref 5–45)
BASOPHILS # BLD AUTO: 0.02 THOUSANDS/ΜL (ref 0–0.1)
BASOPHILS NFR BLD AUTO: 0 % (ref 0–1)
BILIRUB SERPL-MCNC: 0.79 MG/DL (ref 0.2–1)
BUN SERPL-MCNC: 12 MG/DL (ref 5–25)
CALCIUM SERPL-MCNC: 8.9 MG/DL (ref 8.3–10.1)
CHLORIDE SERPL-SCNC: 106 MMOL/L (ref 100–108)
CHOLEST SERPL-MCNC: 143 MG/DL (ref 50–200)
CO2 SERPL-SCNC: 29 MMOL/L (ref 21–32)
CREAT SERPL-MCNC: 0.9 MG/DL (ref 0.6–1.3)
EOSINOPHIL # BLD AUTO: 0.55 THOUSAND/ΜL (ref 0–0.61)
EOSINOPHIL NFR BLD AUTO: 10 % (ref 0–6)
ERYTHROCYTE [DISTWIDTH] IN BLOOD BY AUTOMATED COUNT: 12.5 % (ref 11.6–15.1)
GFR SERPL CREATININE-BSD FRML MDRD: 78 ML/MIN/1.73SQ M
GLUCOSE P FAST SERPL-MCNC: 98 MG/DL (ref 65–99)
HCT VFR BLD AUTO: 42 % (ref 36.5–49.3)
HGB BLD-MCNC: 14 G/DL (ref 12–17)
IMM GRANULOCYTES # BLD AUTO: 0.01 THOUSAND/UL (ref 0–0.2)
IMM GRANULOCYTES NFR BLD AUTO: 0 % (ref 0–2)
LDLC SERPL DIRECT ASSAY-MCNC: 72 MG/DL (ref 0–100)
LYMPHOCYTES # BLD AUTO: 1.76 THOUSANDS/ΜL (ref 0.6–4.47)
LYMPHOCYTES NFR BLD AUTO: 32 % (ref 14–44)
MCH RBC QN AUTO: 29.7 PG (ref 26.8–34.3)
MCHC RBC AUTO-ENTMCNC: 33.3 G/DL (ref 31.4–37.4)
MCV RBC AUTO: 89 FL (ref 82–98)
MONOCYTES # BLD AUTO: 0.5 THOUSAND/ΜL (ref 0.17–1.22)
MONOCYTES NFR BLD AUTO: 9 % (ref 4–12)
NEUTROPHILS # BLD AUTO: 2.69 THOUSANDS/ΜL (ref 1.85–7.62)
NEUTS SEG NFR BLD AUTO: 49 % (ref 43–75)
NRBC BLD AUTO-RTO: 0 /100 WBCS
POTASSIUM SERPL-SCNC: 3.5 MMOL/L (ref 3.5–5.3)
PROT SERPL-MCNC: 7.2 G/DL (ref 6.4–8.2)
RBC # BLD AUTO: 4.71 MILLION/UL (ref 3.88–5.62)
SODIUM SERPL-SCNC: 140 MMOL/L (ref 136–145)
T3 SERPL-MCNC: 0.9 NG/ML (ref 0.6–1.8)
T4 SERPL-MCNC: 7.3 UG/DL (ref 4.7–13.3)
TSH SERPL DL<=0.05 MIU/L-ACNC: 3.4 UIU/ML (ref 0.36–3.74)
WBC # BLD AUTO: 5.53 THOUSAND/UL (ref 4.31–10.16)

## 2019-07-01 PROCEDURE — 80053 COMPREHEN METABOLIC PANEL: CPT

## 2019-07-01 PROCEDURE — 82465 ASSAY BLD/SERUM CHOLESTEROL: CPT

## 2019-07-01 PROCEDURE — 84443 ASSAY THYROID STIM HORMONE: CPT

## 2019-07-01 PROCEDURE — 84436 ASSAY OF TOTAL THYROXINE: CPT

## 2019-07-01 PROCEDURE — 82306 VITAMIN D 25 HYDROXY: CPT

## 2019-07-01 PROCEDURE — 36415 COLL VENOUS BLD VENIPUNCTURE: CPT

## 2019-07-01 PROCEDURE — 84480 ASSAY TRIIODOTHYRONINE (T3): CPT

## 2019-07-01 PROCEDURE — 85025 COMPLETE CBC W/AUTO DIFF WBC: CPT

## 2019-07-01 PROCEDURE — 83721 ASSAY OF BLOOD LIPOPROTEIN: CPT

## 2019-08-12 ENCOUNTER — APPOINTMENT (EMERGENCY)
Dept: RADIOLOGY | Facility: HOSPITAL | Age: 84
End: 2019-08-12
Payer: MEDICARE

## 2019-08-12 ENCOUNTER — APPOINTMENT (EMERGENCY)
Dept: CT IMAGING | Facility: HOSPITAL | Age: 84
End: 2019-08-12
Payer: MEDICARE

## 2019-08-12 ENCOUNTER — HOSPITAL ENCOUNTER (EMERGENCY)
Facility: HOSPITAL | Age: 84
Discharge: HOME | End: 2019-08-12
Attending: EMERGENCY MEDICINE | Admitting: EMERGENCY MEDICINE
Payer: MEDICARE

## 2019-08-12 VITALS
SYSTOLIC BLOOD PRESSURE: 145 MMHG | WEIGHT: 218.26 LBS | RESPIRATION RATE: 20 BRPM | OXYGEN SATURATION: 94 % | HEART RATE: 87 BPM | BODY MASS INDEX: 32.33 KG/M2 | HEIGHT: 69 IN | TEMPERATURE: 98.6 F | DIASTOLIC BLOOD PRESSURE: 65 MMHG

## 2019-08-12 DIAGNOSIS — R53.1 GENERALIZED WEAKNESS: Primary | ICD-10-CM

## 2019-08-12 LAB
ALBUMIN SERPL BCP-MCNC: 3.7 G/DL (ref 3.5–5)
ALP SERPL-CCNC: 55 U/L (ref 46–116)
ALT SERPL W P-5'-P-CCNC: 22 U/L (ref 12–78)
ANION GAP SERPL CALCULATED.3IONS-SCNC: 8 MMOL/L (ref 4–13)
AST SERPL W P-5'-P-CCNC: 26 U/L (ref 5–45)
BASOPHILS # BLD AUTO: 0.04 THOUSANDS/ΜL (ref 0–0.1)
BASOPHILS NFR BLD AUTO: 1 % (ref 0–1)
BILIRUB SERPL-MCNC: 1 MG/DL (ref 0.2–1)
BILIRUB UR QL STRIP: NEGATIVE
BUN SERPL-MCNC: 16 MG/DL (ref 5–25)
CALCIUM SERPL-MCNC: 9.1 MG/DL (ref 8.3–10.1)
CHLORIDE SERPL-SCNC: 105 MMOL/L (ref 100–108)
CLARITY UR: CLEAR
CO2 SERPL-SCNC: 30 MMOL/L (ref 21–32)
COLOR UR: YELLOW
CREAT SERPL-MCNC: 0.95 MG/DL (ref 0.6–1.3)
EOSINOPHIL # BLD AUTO: 0.21 THOUSAND/ΜL (ref 0–0.61)
EOSINOPHIL NFR BLD AUTO: 3 % (ref 0–6)
ERYTHROCYTE [DISTWIDTH] IN BLOOD BY AUTOMATED COUNT: 12.6 % (ref 11.6–15.1)
GFR SERPL CREATININE-BSD FRML MDRD: 73 ML/MIN/1.73SQ M
GLUCOSE SERPL-MCNC: 98 MG/DL (ref 65–140)
GLUCOSE UR STRIP-MCNC: NEGATIVE MG/DL
HCT VFR BLD AUTO: 42.6 % (ref 36.5–49.3)
HGB BLD-MCNC: 14.2 G/DL (ref 12–17)
HGB UR QL STRIP.AUTO: NEGATIVE
IMM GRANULOCYTES # BLD AUTO: 0.01 THOUSAND/UL (ref 0–0.2)
IMM GRANULOCYTES NFR BLD AUTO: 0 % (ref 0–2)
KETONES UR STRIP-MCNC: ABNORMAL MG/DL
LEUKOCYTE ESTERASE UR QL STRIP: NEGATIVE
LYMPHOCYTES # BLD AUTO: 1.02 THOUSANDS/ΜL (ref 0.6–4.47)
LYMPHOCYTES NFR BLD AUTO: 16 % (ref 14–44)
MAGNESIUM SERPL-MCNC: 2.4 MG/DL (ref 1.6–2.6)
MCH RBC QN AUTO: 30.4 PG (ref 26.8–34.3)
MCHC RBC AUTO-ENTMCNC: 33.3 G/DL (ref 31.4–37.4)
MCV RBC AUTO: 91 FL (ref 82–98)
MONOCYTES # BLD AUTO: 0.51 THOUSAND/ΜL (ref 0.17–1.22)
MONOCYTES NFR BLD AUTO: 8 % (ref 4–12)
NEUTROPHILS # BLD AUTO: 4.78 THOUSANDS/ΜL (ref 1.85–7.62)
NEUTS SEG NFR BLD AUTO: 72 % (ref 43–75)
NITRITE UR QL STRIP: NEGATIVE
NRBC BLD AUTO-RTO: 0 /100 WBCS
PH UR STRIP.AUTO: 7 [PH]
PLATELET # BLD AUTO: 196 THOUSANDS/UL (ref 149–390)
PMV BLD AUTO: 10.4 FL (ref 8.9–12.7)
POTASSIUM SERPL-SCNC: 4.3 MMOL/L (ref 3.5–5.3)
PROT SERPL-MCNC: 7.3 G/DL (ref 6.4–8.2)
PROT UR STRIP-MCNC: NEGATIVE MG/DL
RBC # BLD AUTO: 4.67 MILLION/UL (ref 3.88–5.62)
SODIUM SERPL-SCNC: 143 MMOL/L (ref 136–145)
SP GR UR STRIP.AUTO: 1.01 (ref 1–1.03)
TROPONIN I SERPL-MCNC: <0.02 NG/ML
TSH SERPL DL<=0.05 MIU/L-ACNC: 1.78 UIU/ML (ref 0.36–3.74)
UROBILINOGEN UR QL STRIP.AUTO: 2 E.U./DL
WBC # BLD AUTO: 6.57 THOUSAND/UL (ref 4.31–10.16)

## 2019-08-12 PROCEDURE — 93005 ELECTROCARDIOGRAM TRACING: CPT

## 2019-08-12 PROCEDURE — 84443 ASSAY THYROID STIM HORMONE: CPT | Performed by: PHYSICIAN ASSISTANT

## 2019-08-12 PROCEDURE — 99285 EMERGENCY DEPT VISIT HI MDM: CPT

## 2019-08-12 PROCEDURE — 80053 COMPREHEN METABOLIC PANEL: CPT | Performed by: PHYSICIAN ASSISTANT

## 2019-08-12 PROCEDURE — 70450 CT HEAD/BRAIN W/O DYE: CPT

## 2019-08-12 PROCEDURE — 84484 ASSAY OF TROPONIN QUANT: CPT | Performed by: PHYSICIAN ASSISTANT

## 2019-08-12 PROCEDURE — 71046 X-RAY EXAM CHEST 2 VIEWS: CPT

## 2019-08-12 PROCEDURE — 81003 URINALYSIS AUTO W/O SCOPE: CPT | Performed by: PHYSICIAN ASSISTANT

## 2019-08-12 PROCEDURE — 99284 EMERGENCY DEPT VISIT MOD MDM: CPT | Performed by: PHYSICIAN ASSISTANT

## 2019-08-12 PROCEDURE — 36415 COLL VENOUS BLD VENIPUNCTURE: CPT | Performed by: PHYSICIAN ASSISTANT

## 2019-08-12 PROCEDURE — 85025 COMPLETE CBC W/AUTO DIFF WBC: CPT | Performed by: PHYSICIAN ASSISTANT

## 2019-08-12 PROCEDURE — 83735 ASSAY OF MAGNESIUM: CPT | Performed by: PHYSICIAN ASSISTANT

## 2019-08-12 NOTE — ED PROVIDER NOTES
History  Chief Complaint   Patient presents with    Weakness - Generalized     pt complains of generalized weakness and dizziness since waking this morning     80year old male presents with son ambulatory from home for evaluation of generalized weakness  He notes this started when he woke up today  C/o fatigue  He notes he didn't want to eat breakfast this morning and had very little  He complains of feeling dizzy / light headed  Denies fever, chills, or recent illness  Denies chest pain or SOB  Denies N/V/D/C, abdominal pain  Denies any urinary complaints  Denies recent changes in medications  History provided by:  Patient   used: No        Prior to Admission Medications   Prescriptions Last Dose Informant Patient Reported? Taking?   acetaminophen (TYLENOL) 325 mg tablet   No Yes   Sig: Take 2 tablets by mouth every 6 (six) hours as needed for mild pain or fever   capsaicin (ZOSTRIX) 0 025 % cream   No No   Sig: Apply 1 application topically 2 (two) times a day for 30 days   clorazepate (TRANXENE) 7 5 mg tablet   Yes Yes   Sig: Take 7 5 mg by mouth 3 (three) times a day   docusate sodium (COLACE) 100 mg capsule   No Yes   Sig: Take 1 capsule by mouth every 12 (twelve) hours   doxepin (SINEquan) 10 mg capsule   Yes Yes   Sig: Take 10 mg by mouth daily at bedtime   finasteride (PROSCAR) 5 mg tablet   Yes Yes   Sig: Take 5 mg by mouth daily   simvastatin (ZOCOR) 40 mg tablet   Yes Yes   Sig: Take 40 mg by mouth daily at bedtime   tamsulosin (FLOMAX) 0 4 mg   Yes Yes   Sig: Take 0 4 mg by mouth daily with dinner      Facility-Administered Medications: None       Past Medical History:   Diagnosis Date    Enlarged prostate     Hyperlipidemia        Past Surgical History:   Procedure Laterality Date    KNEE SURGERY         History reviewed  No pertinent family history  I have reviewed and agree with the history as documented      Social History     Tobacco Use    Smoking status: Former Smoker    Smokeless tobacco: Never Used   Substance Use Topics    Alcohol use: No    Drug use: No        Review of Systems   Constitutional: Positive for fatigue  Negative for chills and fever  HENT: Negative  Negative for congestion, rhinorrhea and sore throat  Eyes: Negative  Negative for visual disturbance  Respiratory: Negative  Negative for cough, shortness of breath and wheezing  Cardiovascular: Negative  Negative for chest pain, palpitations and leg swelling  Gastrointestinal: Negative  Negative for abdominal pain, constipation, diarrhea, nausea and vomiting  Genitourinary: Negative  Negative for decreased urine volume, dysuria, flank pain, frequency and hematuria  Musculoskeletal: Negative  Negative for back pain, myalgias and neck pain  Skin: Negative  Negative for rash  Neurological: Positive for dizziness and weakness  Negative for syncope, speech difficulty, light-headedness, numbness and headaches  Psychiatric/Behavioral: Negative  Negative for confusion  All other systems reviewed and are negative  Physical Exam  Physical Exam   Constitutional: He is oriented to person, place, and time  He appears well-developed and well-nourished  No distress  Elderly male   HENT:   Head: Normocephalic and atraumatic  Right Ear: Hearing, tympanic membrane, external ear and ear canal normal    Left Ear: Hearing, tympanic membrane, external ear and ear canal normal    Nose: Nose normal    Mouth/Throat: Uvula is midline, oropharynx is clear and moist and mucous membranes are normal  No oropharyngeal exudate  Eyes: Pupils are equal, round, and reactive to light  Conjunctivae and EOM are normal  No scleral icterus    + glasses   Neck: Trachea normal, normal range of motion and phonation normal  Neck supple  No JVD present  No tracheal deviation present  Cardiovascular: Normal rate, regular rhythm, normal heart sounds, intact distal pulses and normal pulses     No murmur heard  Pulmonary/Chest: Effort normal and breath sounds normal  No respiratory distress  He has no wheezes  He has no rhonchi  He has no rales  Abdominal: Soft  Normal appearance and bowel sounds are normal  There is no hepatosplenomegaly  There is no tenderness  There is no rigidity, no rebound, no guarding and no CVA tenderness  No hernia  Musculoskeletal: Normal range of motion  He exhibits no edema or tenderness  Neurological: He is alert and oriented to person, place, and time  He has normal strength and normal reflexes  No cranial nerve deficit or sensory deficit  He exhibits normal muscle tone  Coordination and gait normal  GCS eye subscore is 4  GCS verbal subscore is 5  GCS motor subscore is 6  Skin: Skin is warm and dry  Capillary refill takes less than 2 seconds  No rash noted  He is not diaphoretic  No cyanosis  Nails show no clubbing  Psychiatric: He has a normal mood and affect  His speech is normal and behavior is normal    Nursing note and vitals reviewed        Vital Signs  ED Triage Vitals [08/12/19 0945]   Temperature Pulse Respirations Blood Pressure SpO2   98 6 °F (37 °C) 89 20 157/73 94 %      Temp Source Heart Rate Source Patient Position - Orthostatic VS BP Location FiO2 (%)   Temporal Monitor Lying Right arm --      Pain Score       5           Vitals:    08/12/19 0945 08/12/19 1145 08/12/19 1247   BP: 157/73 137/66 145/65   Pulse: 89 76 87   Patient Position - Orthostatic VS: Lying Lying Sitting         Visual Acuity      ED Medications  Medications - No data to display    Diagnostic Studies  Results Reviewed     Procedure Component Value Units Date/Time    UA w Reflex to Microscopic [[id number]]  (Abnormal) Collected:  08/12/19 1213    Lab Status:  Final result Specimen:  Urine, Clean Catch Updated:  08/12/19 1219     Color, UA Yellow     Clarity, UA Clear     Specific Gravity, UA 1 015     pH, UA 7 0     Leukocytes, UA Negative     Nitrite, UA Negative     Protein, UA Negative mg/dl      Glucose, UA Negative mg/dl      Ketones, UA 15 (1+) mg/dl      Urobilinogen, UA 2 0 E U /dl      Bilirubin, UA Negative     Blood, UA Negative    TSH [[id number]]  (Normal) Collected:  08/12/19 1052    Lab Status:  Final result Specimen:  Blood from Arm, Left Updated:  08/12/19 1122     TSH 3RD GENERATON 1 785 uIU/mL     Narrative:       Patients undergoing fluorescein dye angiography may retain small amounts of fluorescein in the body for 48-72 hours post procedure  Samples containing fluorescein can produce falsely depressed TSH values  If the patient had this procedure,a specimen should be resubmitted post fluorescein clearance        Magnesium [[id number]]  (Normal) Collected:  08/12/19 1052    Lab Status:  Final result Specimen:  Blood from Arm, Left Updated:  08/12/19 1122     Magnesium 2 4 mg/dL     Comprehensive metabolic panel [[id number]] Collected:  08/12/19 1052    Lab Status:  Final result Specimen:  Blood from Arm, Left Updated:  08/12/19 1116     Sodium 143 mmol/L      Potassium 4 3 mmol/L      Chloride 105 mmol/L      CO2 30 mmol/L      ANION GAP 8 mmol/L      BUN 16 mg/dL      Creatinine 0 95 mg/dL      Glucose 98 mg/dL      Calcium 9 1 mg/dL      AST 26 U/L      ALT 22 U/L      Alkaline Phosphatase 55 U/L      Total Protein 7 3 g/dL      Albumin 3 7 g/dL      Total Bilirubin 1 00 mg/dL      eGFR 73 ml/min/1 73sq m     Narrative:       [place] guidelines for Chronic Kidney Disease (CKD):     Stage 1 with normal or high GFR (GFR > 90 mL/min/1 73 square meters)    Stage 2 Mild CKD (GFR = 60-89 mL/min/1 73 square meters)    Stage 3A Moderate CKD (GFR = 45-59 mL/min/1 73 square meters)    Stage 3B Moderate CKD (GFR = 30-44 mL/min/1 73 square meters)    Stage 4 Severe CKD (GFR = 15-29 mL/min/1 73 square meters)    Stage 5 End Stage CKD (GFR <15 mL/min/1 73 square meters)  Note: GFR calculation is accurate only with a steady state creatinine    Troponin I [[id number]]  (Normal) Collected:  08/12/19 1052    Lab Status:  Final result Specimen:  Blood from Arm, Left Updated:  08/12/19 1116     Troponin I <0 02 ng/mL     CBC and differential [[id number]] Collected:  08/12/19 1052    Lab Status:  Final result Specimen:  Blood from Arm, Left Updated:  08/12/19 1057     WBC 6 57 Thousand/uL      RBC 4 67 Million/uL      Hemoglobin 14 2 g/dL      Hematocrit 42 6 %      MCV 91 fL      MCH 30 4 pg      MCHC 33 3 g/dL      RDW 12 6 %      MPV 10 4 fL      Platelets 310 Thousands/uL      nRBC 0 /100 WBCs      Neutrophils Relative 72 %      Immat GRANS % 0 %      Lymphocytes Relative 16 %      Monocytes Relative 8 %      Eosinophils Relative 3 %      Basophils Relative 1 %      Neutrophils Absolute 4 78 Thousands/µL      Immature Grans Absolute 0 01 Thousand/uL      Lymphocytes Absolute 1 02 Thousands/µL      Monocytes Absolute 0 51 Thousand/µL      Eosinophils Absolute 0 21 Thousand/µL      Basophils Absolute 0 04 Thousands/µL                  CT head without contrast   Final Result by Joaquín Beltran DO (08/12 1126)      No acute intracranial abnormality or significant interval change from previous study  Workstation performed: [id number]         XR chest 2 views   Final Result by Emmanuelle Mcmahan MD (08/12 1133)      1  Hyperinflation could be due to air trapping or exuberant inspiratory effort  2   No pneumonic consolidation or other acute cardiac pulmonary findings              Workstation performed: [id number]                    Procedures  ECG 12 Lead Documentation Only  Date/Time: 8/12/2019 9:53 AM  Performed by: Elliot Knox PA-C  Authorized by: Elliot Knox PA-C     Indications / Diagnosis:  Generalized weakness  ECG reviewed by me, the ED Provider: yes    Patient location:  ED  Previous ECG:     Previous ECG:  Compared to current    Comparison ECG info:  10/26/17    Similarity:  Changes noted (QT has lengthened)    Comparison to cardiac monitor: Yes    Comments:      NSR, rate of 88, left axis deviation, RBBB; no acute changes  , , QT/QTc 402/486           ED Course  ED Course as of Aug 12 1447   Mon Aug 12, 2019   6129 Nonspecific symptoms, no focal findings on exam   Will proceed with further work up  Pt afebrile and hemodynamically stable  He expresses concerns that they are spraying for bugs in his [place] and he has been worried about this  36 Pt told nursing staff his apartment was being treated for bed bugs  He had full exam which revealed no bites/marks  Pt denies ever seeing any bugs on himself or having unexplained marks/itching, etc       1102 WBC: 6 57   1102 Hemoglobin: 14 2   1102 Platelet Count: 756   1117 Glucose, Random: 98   1117 Creatinine: 0 95   1117 BUN: 16   1117 Sodium: 143   1117 Potassium: 4 3   1117 Chloride: 105   1117 CO2: 30   1117 Anion Gap: 8   1117 Calcium: 9 1   1117 AST: 26   1117 ALT: 22   1117 Alkaline Phosphatase: 55   1117 Total Protein: 7 3   1117 Albumin: 3 7   1117 TOTAL BILIRUBIN: 1 00   1117 eGFR: 73   1117 Troponin I: <0 02   1123 TSH 3RD GENERATON: 1 785   1123 Magnesium: 2 4   1203 Impression:      1   Hyperinflation could be due to air trapping or exuberant inspiratory effort  2   No pneumonic consolidation or other acute cardiac pulmonary findings  XR chest 2 views   1203 Impression:      No acute intracranial abnormality or significant interval change from previous study  CT head without contrast   1247 Urine return negative for infection  Findings again reviewed with pt and son  Patient ambulates without assistance  He states he feels dizzy if he stands up too quickly but asymptomatic here  Son notes he has two bottles of chlorazepate which have different instructions, son is concerned he may be taking both meds  Advised pt and son to clarify with prescribing physician as to which dose he should be taking    Pt deemed appropriate for discharge home and continued conservative treatment  Advised rest, fluids and f/u with PCP  Strict return precautions outlined  Should f/u with PCP or return as needed  Pt and son verbalized understanding and had no further questions  HEART Risk Score      Most Recent Value   History  0 Filed at: 08/12/2019 1005   ECG  0 Filed at: 08/12/2019 1005   Age  2 Filed at: 08/12/2019 1005   Risk Factors  1 Filed at: 08/12/2019 1005   Troponin  0 Filed at: 08/12/2019 1005   Heart Score Risk Calculator   History  0 Filed at: 08/12/2019 1005   ECG  0 Filed at: 08/12/2019 1005   Age  2 Filed at: 08/12/2019 1005   Risk Factors  1 Filed at: 08/12/2019 1005   Troponin  0 Filed at: 08/12/2019 1005   HEART Score  3 Filed at: 08/12/2019 1005   HEART Score  3 Filed at: 08/12/2019 1005                            MDM  Number of Diagnoses or Management Options  Generalized weakness: new and requires workup     Amount and/or Complexity of Data Reviewed  Clinical lab tests: ordered and reviewed  Tests in the radiology section of CPT®: ordered and reviewed  Decide to obtain previous medical records or to obtain history from someone other than the patient: yes  Obtain history from someone other than the patient: yes  Review and summarize past medical records: yes  Independent visualization of images, tracings, or specimens: yes    Patient Progress  Patient progress: improved      Disposition  Final diagnoses:   Generalized weakness     Time reflects when diagnosis was documented in both MDM as applicable and the Disposition within this note     Time User Action Codes Description Comment    8/12/2019 12:38 PM Supriya Dunne Add [R53 1] Generalized weakness       ED Disposition     ED Disposition Condition Date/Time Comment    Discharge Stable Mon Aug 12, 2019 12:38 PM Ovidio Colon Sr  discharge to home/self care              Follow-up Information     Follow up With Specialties Details Why [facility],  Family Medicine Schedule an appointment as soon as possible for a visit   [address] [place] [address] [facility]  [phone]            Discharge Medication List as of 8/12/2019 12:38 PM      CONTINUE these medications which have NOT CHANGED    Details   acetaminophen (TYLENOL) 325 mg tablet Take 2 tablets by mouth every 6 (six) hours as needed for mild pain or fever, Starting 11/1/2016, Until Discontinued, Print      clorazepate (TRANXENE) 7 5 mg tablet Take 7 5 mg by mouth 3 (three) times a day, Historical Med      docusate sodium (COLACE) 100 mg capsule Take 1 capsule by mouth every 12 (twelve) hours, Starting Sat 7/15/2017, Print      doxepin (SINEquan) 10 mg capsule Take 10 mg by mouth daily at bedtime, Until Discontinued, Historical Med      finasteride (PROSCAR) 5 mg tablet Take 5 mg by mouth daily, Until Discontinued, Historical Med      simvastatin (ZOCOR) 40 mg tablet Take 40 mg by mouth daily at bedtime, Until Discontinued, Historical Med      tamsulosin (FLOMAX) 0 4 mg Take 0 4 mg by mouth daily with dinner, Until Discontinued, Historical Med      capsaicin (ZOSTRIX) 0 025 % cream Apply 1 application topically 2 (two) times a day for 30 days, Starting 11/1/2016, Until Thu 12/1/16, Print           No discharge procedures on file      ED Provider  Electronically Signed by           Glenroy Browning PA-C  08/12/19 6622

## 2019-08-12 NOTE — CASE MANAGEMENT
I self referred the patient to talk about resources that might be available to him  The patient denied needing any help at this time  The patient lives alone in an apartment with an elevator  The patient has no medical equipment and he takes care of his own ADL'S  He receives meals on wheels  Home health does not come into his home  [facility] He uses [facility] in [place]  He has no problem getting or paying for his medicine  The patient does drive  He has Medicare A and B and [employer] for his medical insurance  He did not call his PCP before coming into the ED today

## 2019-08-12 NOTE — ED NOTES
Patient transported to [facility], [address] [place] [address]  08/12/19 [id number]

## 2019-08-13 ENCOUNTER — HOSPITAL ENCOUNTER (EMERGENCY)
Facility: HOSPITAL | Age: 84
Discharge: HOME | End: 2019-08-13
Attending: EMERGENCY MEDICINE | Admitting: EMERGENCY MEDICINE
Payer: MEDICARE

## 2019-08-13 ENCOUNTER — APPOINTMENT (EMERGENCY)
Dept: CT IMAGING | Facility: HOSPITAL | Age: 84
End: 2019-08-13
Payer: MEDICARE

## 2019-08-13 VITALS
HEIGHT: 70 IN | DIASTOLIC BLOOD PRESSURE: 62 MMHG | TEMPERATURE: 97.9 F | WEIGHT: 217.59 LBS | OXYGEN SATURATION: 96 % | SYSTOLIC BLOOD PRESSURE: 131 MMHG | RESPIRATION RATE: 22 BRPM | BODY MASS INDEX: 31.15 KG/M2 | HEART RATE: 85 BPM

## 2019-08-13 DIAGNOSIS — R53.1 WEAKNESS: Primary | ICD-10-CM

## 2019-08-13 LAB
ANION GAP SERPL CALCULATED.3IONS-SCNC: 6 MMOL/L (ref 4–13)
ATRIAL RATE: 72 BPM
ATRIAL RATE: 88 BPM
BASOPHILS # BLD AUTO: 0.03 THOUSANDS/ΜL (ref 0–0.1)
BASOPHILS NFR BLD AUTO: 1 % (ref 0–1)
BILIRUB UR QL STRIP: NEGATIVE
BUN SERPL-MCNC: 13 MG/DL (ref 5–25)
CALCIUM SERPL-MCNC: 9 MG/DL (ref 8.3–10.1)
CHLORIDE SERPL-SCNC: 106 MMOL/L (ref 100–108)
CLARITY UR: CLEAR
CO2 SERPL-SCNC: 31 MMOL/L (ref 21–32)
COLOR UR: YELLOW
CREAT SERPL-MCNC: 0.99 MG/DL (ref 0.6–1.3)
EOSINOPHIL # BLD AUTO: 0.18 THOUSAND/ΜL (ref 0–0.61)
EOSINOPHIL NFR BLD AUTO: 4 % (ref 0–6)
ERYTHROCYTE [DISTWIDTH] IN BLOOD BY AUTOMATED COUNT: 12.6 % (ref 11.6–15.1)
GFR SERPL CREATININE-BSD FRML MDRD: 70 ML/MIN/1.73SQ M
GLUCOSE SERPL-MCNC: 108 MG/DL (ref 65–140)
GLUCOSE UR STRIP-MCNC: NEGATIVE MG/DL
HCT VFR BLD AUTO: 45.8 % (ref 36.5–49.3)
HGB BLD-MCNC: 14.9 G/DL (ref 12–17)
HGB UR QL STRIP.AUTO: NEGATIVE
IMM GRANULOCYTES # BLD AUTO: 0.01 THOUSAND/UL (ref 0–0.2)
IMM GRANULOCYTES NFR BLD AUTO: 0 % (ref 0–2)
KETONES UR STRIP-MCNC: NEGATIVE MG/DL
LEUKOCYTE ESTERASE UR QL STRIP: NEGATIVE
LYMPHOCYTES # BLD AUTO: 1.08 THOUSANDS/ΜL (ref 0.6–4.47)
LYMPHOCYTES NFR BLD AUTO: 21 % (ref 14–44)
MCH RBC QN AUTO: 29.7 PG (ref 26.8–34.3)
MCHC RBC AUTO-ENTMCNC: 32.5 G/DL (ref 31.4–37.4)
MCV RBC AUTO: 91 FL (ref 82–98)
MONOCYTES # BLD AUTO: 0.41 THOUSAND/ΜL (ref 0.17–1.22)
MONOCYTES NFR BLD AUTO: 8 % (ref 4–12)
NEUTROPHILS # BLD AUTO: 3.45 THOUSANDS/ΜL (ref 1.85–7.62)
NEUTS SEG NFR BLD AUTO: 66 % (ref 43–75)
NITRITE UR QL STRIP: NEGATIVE
NRBC BLD AUTO-RTO: 0 /100 WBCS
P AXIS: 47 DEGREES
P AXIS: 75 DEGREES
PH UR STRIP.AUTO: 7 [PH]
PLATELET # BLD AUTO: 200 THOUSANDS/UL (ref 149–390)
PMV BLD AUTO: 10.4 FL (ref 8.9–12.7)
POTASSIUM SERPL-SCNC: 3.9 MMOL/L (ref 3.5–5.3)
PR INTERVAL: 196 MS
PR INTERVAL: 202 MS
PROT UR STRIP-MCNC: NEGATIVE MG/DL
QRS AXIS: -64 DEGREES
QRS AXIS: -67 DEGREES
QRSD INTERVAL: 140 MS
QRSD INTERVAL: 144 MS
QT INTERVAL: 402 MS
QT INTERVAL: 422 MS
QTC INTERVAL: 462 MS
QTC INTERVAL: 486 MS
RBC # BLD AUTO: 5.02 MILLION/UL (ref 3.88–5.62)
SODIUM SERPL-SCNC: 143 MMOL/L (ref 136–145)
SP GR UR STRIP.AUTO: 1.01 (ref 1–1.03)
T WAVE AXIS: 44 DEGREES
T WAVE AXIS: 88 DEGREES
TROPONIN I SERPL-MCNC: <0.02 NG/ML
UROBILINOGEN UR QL STRIP.AUTO: 1 E.U./DL
VENTRICULAR RATE: 72 BPM
VENTRICULAR RATE: 88 BPM
WBC # BLD AUTO: 5.16 THOUSAND/UL (ref 4.31–10.16)

## 2019-08-13 PROCEDURE — 99285 EMERGENCY DEPT VISIT HI MDM: CPT

## 2019-08-13 PROCEDURE — 81003 URINALYSIS AUTO W/O SCOPE: CPT | Performed by: EMERGENCY MEDICINE

## 2019-08-13 PROCEDURE — 96374 THER/PROPH/DIAG INJ IV PUSH: CPT

## 2019-08-13 PROCEDURE — 93010 ELECTROCARDIOGRAM REPORT: CPT | Performed by: INTERNAL MEDICINE

## 2019-08-13 PROCEDURE — 74177 CT ABD & PELVIS W/CONTRAST: CPT

## 2019-08-13 PROCEDURE — 85025 COMPLETE CBC W/AUTO DIFF WBC: CPT | Performed by: EMERGENCY MEDICINE

## 2019-08-13 PROCEDURE — 84484 ASSAY OF TROPONIN QUANT: CPT | Performed by: EMERGENCY MEDICINE

## 2019-08-13 PROCEDURE — 36415 COLL VENOUS BLD VENIPUNCTURE: CPT | Performed by: EMERGENCY MEDICINE

## 2019-08-13 PROCEDURE — 80048 BASIC METABOLIC PNL TOTAL CA: CPT | Performed by: EMERGENCY MEDICINE

## 2019-08-13 PROCEDURE — 99284 EMERGENCY DEPT VISIT MOD MDM: CPT | Performed by: EMERGENCY MEDICINE

## 2019-08-13 PROCEDURE — 93005 ELECTROCARDIOGRAM TRACING: CPT

## 2019-08-13 RX ORDER — ONDANSETRON 2 MG/ML
4 INJECTION INTRAMUSCULAR; INTRAVENOUS ONCE
Status: COMPLETED | OUTPATIENT
Start: 2019-08-13 | End: 2019-08-13

## 2019-08-13 RX ORDER — ONDANSETRON 4 MG/1
4 TABLET, ORALLY DISINTEGRATING ORAL EVERY 8 HOURS SCHEDULED
Qty: 12 TABLET | Refills: 0 | Status: SHIPPED | OUTPATIENT
Start: 2019-08-13 | End: 2019-08-20

## 2019-08-13 RX ADMIN — ONDANSETRON 4 MG: 2 INJECTION INTRAMUSCULAR; INTRAVENOUS at 10:56

## 2019-08-13 RX ADMIN — IOHEXOL 100 ML: 350 INJECTION, SOLUTION INTRAVENOUS at 11:49

## 2019-08-13 NOTE — CASE MANAGEMENT
I was referred to the patient to discuss resources for home care  When talking to the patient and his son the patient stated he would like to go to an assisted living facility  I explained to the patient that asisted living is out of pocket expense which he stated he does not have the means to pay for that  He stated he still would like to go I referred him to speak with his PCP because that's who facilitates assisted living  The patient then stated he gets very lonely, we then discussed area of aging and their role  The son wanted me to contact them I spoke to daysi at [facility] and they are coming to interview the patient about what could be available to him  [facility] mentioned adult day care for the patient  When I discussed that with the patient he seemed interested  The son also asked for information about different PCP's I gave him a list of local pcp's  The son is concerned about the patient's medications  I talked to them about [facility] getting bl[facility] and [facility] deliver  I gave the son the phone number for [facility] and [facility]

## 2019-08-13 NOTE — ED PROVIDER NOTES
History  Chief Complaint   Patient presents with    Weakness - Generalized      denies nausea; experiencing dizziness; started at 10     27-year-old male returns with the son due to concerns of dizziness and nausea starting at 0700 hours he was evaluated in the emergency department for similar complaints yesterday his evaluation included EKG head scan chest x-ray urine chemistries CBC TSH troponin all studies were unremarkable except for a small amount of ketones in the urine  He is able to go home and tolerate dinner he describes sausage mashed potatoes and a vegetable he woke up this morning he felt slightly unsteady is a little nausea is been 1 he does breakfast so he contact his son his son thinks he may have been experience some some epigastric pain  This stone is not sure that he has been able to take his medications as scheduled they do have an appointment with their family doctor at approximately 1300 hours today son is unsure if he is safe to stay at the apartment he seems more anxious than usual there is no history of suicidal ideation but he seems more depressed since his  passed away in April  Son is tried to draw mouth [employer] activities but has not been successful as he just wants to stained his apartment; no history of trauma or falls  Prior to Admission Medications   Prescriptions Last Dose Informant Patient Reported?  Taking?   acetaminophen (TYLENOL) 325 mg tablet Not Taking at Unknown time  No No   Sig: Take 2 tablets by mouth every 6 (six) hours as needed for mild pain or fever   Patient not taking: Reported on 8/13/2019   clorazepate (TRANXENE) 7 5 mg tablet 8/13/2019 at Unknown time  Yes Yes   Sig: Take 7 5 mg by mouth 3 (three) times a day   docusate sodium (COLACE) 100 mg capsule   No No   Sig: Take 1 capsule by mouth every 12 (twelve) hours   doxepin (SINEquan) 10 mg capsule 8/13/2019 at Unknown time  Yes Yes   Sig: Take 10 mg by mouth daily at bedtime   finasteride (PROSCAR) 5 mg tablet Unknown at Unknown time  Yes No   Sig: Take 5 mg by mouth daily   simvastatin (ZOCOR) 40 mg tablet Unknown at Unknown time  Yes No   Sig: Take 40 mg by mouth daily at bedtime   tamsulosin (FLOMAX) 0 4 mg Not Taking at Unknown time  Yes No   Sig: Take 0 4 mg by mouth daily with dinner      Facility-Administered Medications: None       Past Medical History:   Diagnosis Date    [facility] ([facility] ) 2016    Bladder cancer ([place] [address] ) 2016    Enlarged prostate     Hyperlipidemia        Past Surgical History:   Procedure Laterality Date    BLADDER SURGERY  2016    bladder cancer    KNEE SURGERY         History reviewed  No pertinent family history  I have reviewed and agree with the history as documented  Social History     Tobacco Use    Smoking status: Former Smoker    Smokeless tobacco: Never Used   Substance Use Topics    Alcohol use: No    Drug use: No        Review of Systems   Constitutional: Negative for activity change, chills and fever  HENT: Negative for congestion, ear pain, rhinorrhea, sneezing and sore throat  Eyes: Negative for discharge  Respiratory: Negative for cough and shortness of breath  Cardiovascular: Negative for chest pain and leg swelling  Gastrointestinal: Positive for abdominal pain (epigastric) and nausea  Negative for blood in stool, diarrhea and vomiting  Endocrine: Negative for polyuria  Genitourinary: Negative for dysuria, frequency and urgency  Musculoskeletal: Negative for back pain and myalgias  Skin: Negative for rash  Neurological: Positive for dizziness  Negative for weakness, numbness and headaches  Hematological: Negative for adenopathy  Psychiatric/Behavioral: Negative for confusion and suicidal ideas  All other systems reviewed and are negative  Physical Exam  Physical Exam   Constitutional: He is oriented to person, place, and time  He appears well-developed and well-nourished  No distress     HENT:   Head: Normocephalic and atraumatic  Right Ear: External ear normal    Left Ear: External ear normal    Nose: Nose normal    Mouth/Throat: Oropharynx is clear and moist    Eyes: Pupils are equal, round, and reactive to light  Conjunctivae and EOM are normal  Right eye exhibits no discharge  Left eye exhibits no discharge  No scleral icterus  Neck: Normal range of motion  Neck supple  Cardiovascular: Normal rate, regular rhythm, normal heart sounds and intact distal pulses  Pulmonary/Chest: Effort normal and breath sounds normal  No respiratory distress  Abdominal: Soft  Bowel sounds are normal  He exhibits no distension and no mass  There is no tenderness  There is no rebound and no guarding  Back: no mildline or CVA tenderness   Musculoskeletal: Normal range of motion  He exhibits no edema, tenderness or deformity  Lymphadenopathy:     He has no cervical adenopathy  Neurological: He is alert and oriented to person, place, and time  He displays normal reflexes  No cranial nerve deficit or sensory deficit  He exhibits normal muscle tone  Coordination normal    Gait steady   Skin: Skin is warm and dry  He is not diaphoretic  Psychiatric: He has a normal mood and affect  Nursing note and vitals reviewed        Vital Signs  ED Triage Vitals [08/13/19 0842]   Temperature Pulse Respirations Blood Pressure SpO2   97 9 °F (36 6 °C) 77 16 166/75 95 %      Temp Source Heart Rate Source Patient Position - Orthostatic VS BP Location FiO2 (%)   Temporal Monitor Lying Left arm --      Pain Score       5           Vitals:    08/13/19 0924 08/13/19 1100 08/13/19 1200 08/13/19 1336   BP: 148/62 148/76 151/63 131/62   Pulse: 71 71 71 85   Patient Position - Orthostatic VS: Lying Lying Lying Lying         Visual Acuity  Visual Acuity      Most Recent Value   L Pupil Size (mm)  3   R Pupil Size (mm)  3          ED Medications  Medications   ondansetron (ZOFRAN) injection 4 mg (4 mg Intravenous Given 8/13/19 1056)   iohexol (OMNIPAQUE) 350 MG/ML injection (SINGLE-DOSE) 100 mL (100 mL Intravenous Given 8/13/19 1149)       Diagnostic Studies  Results Reviewed     Procedure Component Value Units Date/Time    Troponin I [[id number]]  (Normal) Collected:  08/13/19 1048    Lab Status:  Final result Specimen:  Blood from Arm, Left Updated:  08/13/19 1125     Troponin I <0 02 ng/mL     Basic metabolic panel [[id number]] Collected:  08/13/19 1048    Lab Status:  Final result Specimen:  Blood from Arm, Left Updated:  08/13/19 1115     Sodium 143 mmol/L      Potassium 3 9 mmol/L      Chloride 106 mmol/L      CO2 31 mmol/L      ANION GAP 6 mmol/L      BUN 13 mg/dL      Creatinine 0 99 mg/dL      Glucose 108 mg/dL      Calcium 9 0 mg/dL      eGFR 70 ml/min/1 73sq m     Narrative:       [place] guidelines for Chronic Kidney Disease (CKD):     Stage 1 with normal or high GFR (GFR > 90 mL/min/1 73 square meters)    Stage 2 Mild CKD (GFR = 60-89 mL/min/1 73 square meters)    Stage 3A Moderate CKD (GFR = 45-59 mL/min/1 73 square meters)    Stage 3B Moderate CKD (GFR = 30-44 mL/min/1 73 square meters)    Stage 4 Severe CKD (GFR = 15-29 mL/min/1 73 square meters)    Stage 5 End Stage CKD (GFR <15 mL/min/1 73 square meters)  Note: GFR calculation is accurate only with a steady state creatinine    UA w Reflex to Microscopic w Reflex to Culture [[id number]] Collected:  08/13/19 1037    Lab Status:  Final result Specimen:  Urine, Clean Catch Updated:  08/13/19 1107     Color, UA Yellow     Clarity, UA Clear     Specific Gravity, UA 1 010     pH, UA 7 0     Leukocytes, UA Negative     Nitrite, UA Negative     Protein, UA Negative mg/dl      Glucose, UA Negative mg/dl      Ketones, UA Negative mg/dl      Urobilinogen, UA 1 0 E U /dl      Bilirubin, UA Negative     Blood, UA Negative    CBC and differential [[id number]] Collected:  08/13/19 1048    Lab Status:  Final result Specimen:  Blood from Arm, Left Updated:  08/13/19 1107 WBC 5 16 Thousand/uL      RBC 5 02 Million/uL      Hemoglobin 14 9 g/dL      Hematocrit 45 8 %      MCV 91 fL      MCH 29 7 pg      MCHC 32 5 g/dL      RDW 12 6 %      MPV 10 4 fL      Platelets 196 Thousands/uL      nRBC 0 /100 WBCs      Neutrophils Relative 66 %      Immat GRANS % 0 %      Lymphocytes Relative 21 %      Monocytes Relative 8 %      Eosinophils Relative 4 %      Basophils Relative 1 %      Neutrophils Absolute 3 45 Thousands/µL      Immature Grans Absolute 0 01 Thousand/uL      Lymphocytes Absolute 1 08 Thousands/µL      Monocytes Absolute 0 41 Thousand/µL      Eosinophils Absolute 0 18 Thousand/µL      Basophils Absolute 0 03 Thousands/µL                  CT abdomen pelvis with contrast   Final Result by Zev Walsh MD (08/13 1207)      No acute pathology  Colonic diverticulosis without diverticulitis  Workstation performed: [id number]                    Procedures  ECG 12 Lead Documentation Only  Date/Time: 8/13/2019 11:34 AM  Performed by: Hugo Beltran MD  Authorized by: Hugo Beltran MD     Indications / Diagnosis:  Epigastric pain  ECG reviewed by me, the ED Provider: yes    Patient location:  ED  Previous ECG:     Previous ECG:  Compared to current    Comparison ECG info:  8/12/19    Similarity:  No change  Rate:     ECG rate:  72    ECG rate assessment: normal    Rhythm:     Rhythm: sinus rhythm    QRS:     QRS axis:  Left  Comments:      RBBB; no acute ischemic changes           ED Course  ED Course as of Aug 13 1454   Tue Aug 13, 2019   1128 Mirian Shaw ED navigator was consulted and is working with the family  Reviewed absent such as adult  involvement of area and aging home care living and nursing home placement  5 Initially the home the adult  option seemed applealing but patient anxious about returning to his apartment and family no longer things adult day care will work   Will revisit options      1407 In discussion with Mirian Shaw the navigator                                  MDM  Number of Diagnoses or Management Options  Weakness:   Diagnosis management comments: Mdm:  ED note and studies from yesterday were reviewed  There is no evidence of stroke, infection, electrolyte abnormality  He did have mild dehydration will check abdominal pelvis CT secondary to epigastric discomfort  Will recheck EKG, trop and hydrate with NS    In discussions with Mirian Shaw the ED navigator son thought the Home health option was the best  They moved their follow up appt the Dr Tamiko Polanco to just after the ED visit  Referral to home health was made  Recommended involve pharmacy with pill packs or set up patients medications for him  zofran was made available on prn basis for nausea  They can still try adult care through [place] [facility]      Disposition  Final diagnoses:   Weakness     Time reflects when diagnosis was documented in both MDM as applicable and the Disposition within this note     Time User Action Codes Description Comment    8/13/2019  1:50 PM Delmy Fred Add [R42] Dizziness     8/13/2019  1:51 PM Delmy Fred Remove [R42] Dizziness     8/13/2019  1:52 PM Delmy Bishop Add [R53 1] Weakness     8/13/2019  1:52 PM Delmy Bishop Modify [R53 1] Weakness       ED Disposition     ED Disposition Condition Date/Time Comment    Discharge Stable Tue Aug 13, 2019  1:54 PM Leopoldo Coleman Sr  discharge to home/self care              Follow-up Information     Follow up With Specialties Details Why [address] [place] [address], DO Family Medicine Go to  later this week [address] [place] [address]  [phone]            Discharge Medication List as of 8/13/2019  1:57 PM      START taking these medications    Details   ondansetron (ZOFRAN-ODT) 4 mg disintegrating tablet Take 1 tablet (4 mg total) by mouth every 8 (eight) hours for 20 doses, Starting Tue 8/13/2019, Until Tue 8/20/2019, Print         CONTINUE these medications which have NOT CHANGED    Details   clorazepate (TRANXENE) 7 5 mg tablet Take 7 5 mg by mouth 3 (three) times a day, Historical Med      doxepin (SINEquan) 10 mg capsule Take 10 mg by mouth daily at bedtime, Until Discontinued, Historical Med      acetaminophen (TYLENOL) 325 mg tablet Take 2 tablets by mouth every 6 (six) hours as needed for mild pain or fever, Starting 11/1/2016, Until Discontinued, Print      docusate sodium (COLACE) 100 mg capsule Take 1 capsule by mouth every 12 (twelve) hours, Starting Sat 7/15/2017, Print      finasteride (PROSCAR) 5 mg tablet Take 5 mg by mouth daily, Until Discontinued, Historical Med      simvastatin (ZOCOR) 40 mg tablet Take 40 mg by mouth daily at bedtime, Until Discontinued, Historical Med      tamsulosin (FLOMAX) 0 4 mg Take 0 4 mg by mouth daily with dinner, Until Discontinued, Historical Med               ED Provider  Electronically Signed by           Dulce Rausch MD  08/13/19 9821

## 2019-08-13 NOTE — CASE MANAGEMENT
A referral was put in for home health  I gave the patient and his son a list of home health agencies  [facility]  I sent the referral to [facility] and the patient was excepted  [facility] said they will be contacting the patient

## 2019-08-13 NOTE — DISCHARGE INSTRUCTIONS
Referral has been placed for home care    Can still try adult day care options  Talk with our pharmacy to get pills divided into pill packs/trays   Zofran as needed in AM for nausea

## 2019-09-04 ENCOUNTER — OFFICE VISIT (OUTPATIENT)
Dept: PSYCHIATRY | Facility: CLINIC | Age: 84
End: 2019-09-04
Payer: MEDICARE

## 2019-09-04 DIAGNOSIS — G31.84 MCI (MILD COGNITIVE IMPAIRMENT): ICD-10-CM

## 2019-09-04 DIAGNOSIS — F41.1 GAD (GENERALIZED ANXIETY DISORDER): Primary | ICD-10-CM

## 2019-09-04 PROCEDURE — 90792 PSYCH DIAG EVAL W/MED SRVCS: CPT | Performed by: NURSE PRACTITIONER

## 2019-09-04 RX ORDER — SERTRALINE HYDROCHLORIDE 25 MG/1
25 TABLET, FILM COATED ORAL DAILY
Qty: 30 TABLET | Refills: 1 | Status: SHIPPED | OUTPATIENT
Start: 2019-09-04 | End: 2019-10-01 | Stop reason: SDUPTHER

## 2019-09-04 NOTE — PSYCH
Outpatient Psychiatry Intake Exam       PCP: Dirk Boast, DO    Referral source: Dr Valery Waldron    Identifying information:  Torito Lo  is a 80 y o  male with a history of obsessive compulsive disorder with paranoia here for evaluation and determination of mental health management needs  Sources of information:   Information for this evaluation was gathered through direct interview with the patient as well as his son  Additionally PHQ-9 and LUCIA-7 scales were performed and some information was provided by initial intake packet  Confidentiality discussion: Limits of confidentiality in cases of safety concerns involving self and others as well as this physician's role as a mandatory  of abuse  They voiced understanding and a desire to continue with the evaluation  SUBJECTIVE     Chief Complaint / reason for visit: "Just to get a check up "  History of Present Illness:  Patient is an 80year old male who presents with previous psychiatric care from his PCP  He is accompanied by his son who provides a lot of information  Patient presents with flat affect though is pleasant and cooperative  He is appropriately dressed though a bit disheveled  He is alert and oriented to self and date, unable to recall today's location  He had previously been diagnosed with obsessive compulsive disorder with paranoia by his PCP  His son's main concern is that his medications may not be the best for him, as he struggles with anxiety, especially in the morning  Reggie Florez agrees with this, stating he does feel anxious but once he gets his day going, he feels better  His son also states, as does his sister via a printed letter as she is traveling, that Reggie Florez is increasingly forgetful, both short term and long term  He often circumlocutes, using many words to describe the one that he cannot think of    She also notes he is increasingly indecisive, has poor reasoning skills, and inappropriate behavior at times  Kate Yoder has been living by himself in the [facility], though is looking at independent living facilities with his son  He reports being able to complete his daily tasks though admits to becoming confused easily  He feels that he noticed a decline in his mental health when his significant other/companian of 14 years passed away about 6 months ago  He states "I'm getting better about it", but his son does add that he and his  did so many things together  Patient's son adds that increased confusion has occurred this past month since being abruptly taken off of Doxepin 10 mg TID, though is is not sure if this is coincidental or that his father is becoming increasingly cognitively impaired  Stressors: living alone, memory loss    HPI ROS:  Medication Side Effects: none  Depression: 0/10 (10 worst)  Anxiety: 0/10 (10 worst)   Safety (SI, HI, other): denied  Sleep: adequate, has a new mattress  Energy: good, naps when he needs to  Appetite: 'good', has Meals on Wheels  Weight Change: denied    In terms of depression, the patient denies symptoms  In terms of lauren, the patient endorses no  Symptoms include no symptoms    LUCIA symptoms: difficulty concentrating, insomnia and muscle tightness  He also reports physical symptoms of GI upset in the morning due to anxiety upon awakening  Panic Disorder symptoms: no symptoms suggestive of panic disorder  Social Anxiety symptoms: no symptoms suggestive of social anxiety  OCD Symptoms: (Obsession 1/2) Recurrent and persistent thoughts/urges/images that are ego-dystonic and produce marked distress or anxiety , (Compulsion 2/2) AND these are aimed to reduce anxiety or distress or some dreaded event  HOWEVER, thees are not linked realistically or are clearly excessive  Eating Disorder symptoms: no historical or current eating disorder  In terms of PTSD, the patient denies exposure to trauma involving      In terms of psychotic symptoms, the patient denies psychosis  Past Psychiatric History  Previous diagnoses include none  Prior outpatient psychiatric treatment:none  Prior therapy: none  Prior inpatient psychiatric treatment: none  Prior suicide attempts: none  Prior self harm: none  Prior violence or aggression: none    Previous psychotropic medication trials:   Doxepin 10 mg TID as needed, recently stopped by PCP  Chlorazepate (benzodiazepine, currently 3 275 mg TID)    Social History:    The patient grew up in [facility]  Childhood was described as "we worked hard"  During childhood, parents were   They have 2 brothers, five sisters  He is the second oldest     Abuse/neglect: none    As far as the patient (or present family member) is aware, overall childhood development: Patient does ascribe to normal developmental milestones such as walking, talking, potty training and making childhood friends  Education level: [employer]   Current occupation: retired  Marital status:   Children: five children, one daughter, four sons  Current Living Situation: the patient currently lives in the [place]   Social support: family  Anabaptist Affiliation: not regular   experience: [employer] for two years  Legal history: none  Access to Guns: none    Substance use and treatment:  Tobacco use: none, quit 25 years ago  Caffeine Use: sometimes, not regularly  ETOH use: drank heavily until 25 years ago  Other substance use: none  Endorses previous experimentation with: none    Longest clean time: not applicable  History of Inpatient/Outpatient rehabilitation program: no      Traumatic History:     Abuse: none  Other Traumatic Events: none     Family Psychiatric History:     Psychiatric Illness:  Grandmother-possible schizophrenia    No family history on file           Past Medical / Surgical History:    Current PCP: Yvonne Scanlon DO   Other providers include: none    Patient Active Problem List   Diagnosis    Severe obstructive sleep apnea       Past Medical History-  Past Medical History:   Diagnosis Date    Bladder cancer ([facility] [address] ) 2016    Bladder cancer ([facility] [address] ) 2016    Enlarged prostate     Hyperlipidemia           History of Seizures: no  History of Head injury-LOC-Concussion: no    Past Surgical History-  Past Surgical History:   Procedure Laterality Date    BLADDER SURGERY  2016    bladder cancer    KNEE SURGERY            Allergies:   No Known Allergies    Recent labs:   Admission on 08/13/2019, Discharged on 08/13/2019   Component Date Value    WBC 08/13/2019 5 16     RBC 08/13/2019 5 02     Hemoglobin 08/13/2019 14 9     Hematocrit 08/13/2019 45 8     MCV 08/13/2019 91     MCH 08/13/2019 29 7     MCHC 08/13/2019 32 5     RDW 08/13/2019 12 6     MPV 08/13/2019 10 4     Platelets 75/55/8824 200     nRBC 08/13/2019 0     Neutrophils Relative 08/13/2019 66     Immat GRANS % 08/13/2019 0     Lymphocytes Relative 08/13/2019 21     Monocytes Relative 08/13/2019 8     Eosinophils Relative 08/13/2019 4     Basophils Relative 08/13/2019 1     Neutrophils Absolute 08/13/2019 3 45     Immature Grans Absolute 08/13/2019 0 01     Lymphocytes Absolute 08/13/2019 1 08     Monocytes Absolute 08/13/2019 0 41     Eosinophils Absolute 08/13/2019 0 18     Basophils Absolute 08/13/2019 0 03     Sodium 08/13/2019 143     Potassium 08/13/2019 3 9     Chloride 08/13/2019 106     CO2 08/13/2019 31     ANION GAP 08/13/2019 6     BUN 08/13/2019 13     Creatinine 08/13/2019 0 99     Glucose 08/13/2019 108     Calcium 08/13/2019 9 0     eGFR 08/13/2019 70     Color, UA 08/13/2019 Yellow     Clarity, UA 08/13/2019 Clear     Specific Gravity, UA 08/13/2019 1 010     pH, UA 08/13/2019 7 0     Leukocytes, UA 08/13/2019 Negative     Nitrite, UA 08/13/2019 Negative     Protein, UA 08/13/2019 Negative     Glucose, UA 08/13/2019 Negative     Ketones, UA 08/13/2019 Negative     Urobilinogen, UA 08/13/2019 1 0     Bilirubin, UA 08/13/2019 Negative     Blood, UA 08/13/2019 Negative     Troponin I 08/13/2019 <0 02     Ventricular Rate 08/13/2019 72     Atrial Rate 08/13/2019 72     DC Interval 08/13/2019 202     QRSD Interval 08/13/2019 140     QT Interval 08/13/2019 422     QTC Interval 08/13/2019 462     P Axis 08/13/2019 47     QRS Axis 08/13/2019 -59     T Wave Whitmore 08/13/2019 44    Admission on 08/12/2019, Discharged on 08/12/2019   Component Date Value    WBC 08/12/2019 6 57     RBC 08/12/2019 4 67     Hemoglobin 08/12/2019 14 2     Hematocrit 08/12/2019 42 6     MCV 08/12/2019 91     MCH 08/12/2019 30 4     MCHC 08/12/2019 33 3     RDW 08/12/2019 12 6     MPV 08/12/2019 10 4     Platelets 71/21/8034 196     nRBC 08/12/2019 0     Neutrophils Relative 08/12/2019 72     Immat GRANS % 08/12/2019 0     Lymphocytes Relative 08/12/2019 16     Monocytes Relative 08/12/2019 8     Eosinophils Relative 08/12/2019 3     Basophils Relative 08/12/2019 1     Neutrophils Absolute 08/12/2019 4 78     Immature Grans Absolute 08/12/2019 0 01     Lymphocytes Absolute 08/12/2019 1 02     Monocytes Absolute 08/12/2019 0 51     Eosinophils Absolute 08/12/2019 0 21     Basophils Absolute 08/12/2019 0 04     Sodium 08/12/2019 143     Potassium 08/12/2019 4 3     Chloride 08/12/2019 105     CO2 08/12/2019 30     ANION GAP 08/12/2019 8     BUN 08/12/2019 16     Creatinine 08/12/2019 0 95     Glucose 08/12/2019 98     Calcium 08/12/2019 9 1     AST 08/12/2019 26     ALT 08/12/2019 22     Alkaline Phosphatase 08/12/2019 55     Total Protein 08/12/2019 7 3     Albumin 08/12/2019 3 7     Total Bilirubin 08/12/2019 1 00     eGFR 08/12/2019 73     Troponin I 08/12/2019 <0 02     TSH 3RD GENERATON 08/12/2019 1 785     Color, UA 08/12/2019 Yellow     Clarity, UA 08/12/2019 Clear     Specific Gravity, UA 08/12/2019 1 015     pH, UA 08/12/2019 7 0     Leukocytes, UA 08/12/2019 Negative     Nitrite, UA 08/12/2019 Negative     Protein, UA 08/12/2019 Negative     Glucose, UA 08/12/2019 Negative     Ketones, UA 08/12/2019 15 (1+)*    Urobilinogen, UA 08/12/2019 2 0*    Bilirubin, UA 08/12/2019 Negative     Blood, UA 08/12/2019 Negative     Magnesium 08/12/2019 2 4     Ventricular Rate 08/12/2019 88     Atrial Rate 08/12/2019 88     MD Interval 08/12/2019 196     QRSD Interval 08/12/2019 144     QT Interval 08/12/2019 402     QTC Interval 08/12/2019 486     P Axis 08/12/2019 75     QRS Axis 08/12/2019 -79     T Wave Axis 08/12/2019 88       Labs were reviewed  Medical Review Of Systems:     A comprehensive review of systems was negative  Medications:  Current Outpatient Medications on File Prior to Visit   Medication Sig Dispense Refill    acetaminophen (TYLENOL) 325 mg tablet Take 2 tablets by mouth every 6 (six) hours as needed for mild pain or fever (Patient not taking: Reported on 8/13/2019) 30 tablet 0    clorazepate (TRANXENE) 7 5 mg tablet Take 7 5 mg by mouth 3 (three) times a day      docusate sodium (COLACE) 100 mg capsule Take 1 capsule by mouth every 12 (twelve) hours 60 capsule 0    doxepin (SINEquan) 10 mg capsule Take 10 mg by mouth daily at bedtime      finasteride (PROSCAR) 5 mg tablet Take 5 mg by mouth daily      ondansetron (ZOFRAN-ODT) 4 mg disintegrating tablet Take 1 tablet (4 mg total) by mouth every 8 (eight) hours for 20 doses 12 tablet 0    simvastatin (ZOCOR) 40 mg tablet Take 40 mg by mouth daily at bedtime      tamsulosin (FLOMAX) 0 4 mg Take 0 4 mg by mouth daily with dinner       No current facility-administered medications on file prior to visit  Medication Compliant? Yes, though his son thinks he may be confused with medications and may take too much at times  He does have a pill organizer which his son helps organize  All current active medications have been reviewed      Objective     OBJECTIVE     There were no vitals taken for this visit     MENTAL STATUS EXAM  Appearance:  age appropriate, dressed casually   Behavior:  pleasant, cooperative, with good eye contact   Speech:  Normal volume, regular rate and rhythm   Mood:  euthymic   Affect:  mood congruent   Language: anomia Yes various objects   Thought Process:  circumferential, thought blocking   Associations: intact associations   Thought Content:  normal and appropriate   Perceptual Disturbances: no auditory or visual hallcunations   Risk Potential / Abnormal Thoughts: Suicidal ideation - None  Homicidal ideation - None  Potential for aggression - No       Consciousness:  Alert & Awake   Sensorium:  Oriented to self and date   Attention: attention span and concentration are age appropriate   Intellect: within normal limits     Memory:   recent memory mildly impaired, remote memory mildly impaired   Insight:  fair   Judgment: fair   Muscle Strength Muscle Tone: normal  normal   Gait/Station: slow   Motor Activity: no abnormal movements     Pain none   Pain Scale 0     IMPRESSIONS/FORMULATION          There are no diagnoses linked to this encounter  No diagnosis found  Confidential Assessment:  While talking directly with Inge Maurer, he denies having anxiety  Though when discussing how he feels in the morning, he describes nervousness and worry, which subsides after taking medication and getting his day started  He does exhibit signs of LUCIA and admits to having obsessive compulsive tendencies in terms of organization  We discussed how LUCIA and obsessive compulsive tendencies are related  I also completed the [address] [place] [address] Cognitive Assessment) with Inge Maurer; today he scored a 16/30  He is disoriented with impaired problem solving, though does not require prompting for daily tasks  We will revisit the [address] [place] [address] after discontinuation of clorazepate is completed  Patient does drive short distances and his ability to do so will need to be reassessed     While he is currently on clorazepate 3 75 mg TID, his confusion could be influenced by his benzodiazepine; he states he has 'been on it for years'  Both he and his son are in agreement of slowly tapering off clorazepate while starting Zoloft 12 5 mg in the morning  We will keep the dose low while tapering off over the benzodiazepine over the next 1 1/2 months  We can look to add Remeron 7 5 mg in the future if sleep is affected by medication changes  We discussed in depth the risks with benzodiazepine use in the elderly (confusion, sedation, issues with balance)  We will decrease the dose of clorazepate as follows:  3 75 mg AM and at dinner for 21 days  3 75 mg AM only for 21 days  Then stop  We will initial Zoloft starting tomorrow at 12 5 mg in the morning    Scales:  PHQ-9: 0  LUCIA-7: 0 (though verbally states he does have nervousness and general worry, especially in the mornings)  MOCA: 16/30    Иван Nath Sr  is a 80 y o  male who presents with symptoms supporting the aforementioned  Differential includes major depressive disorder  Suicide / Homicide / Safety risk assessment: Safety risk low overall upon consideration of protective and risk factors  Plan:       Education about diagnosis and treatment modalities, patient voiced understanding and agreement with the following plan:    Discussed medication risks, beneftis, alternatives  Patient was informed and had time to ask questions  They agreed to treatment below    Controlled Medication Discussion:       Zaidlornamady Dan has been filling controlled prescriptions on time as [facility]      Initial treatment plan:   1) MEDS: as discussed above: We discussed in depth the risks with benzodiazepine use in the elderly (confusion, sedation, issues with balance)  We will decrease the dose of clorazepate as follows:  3 75 mg AM and at dinner for 21 days  3 75 mg AM only for 21 days  Then stop      We will initial Zoloft starting tomorrow at 12 5 mg in the morning    2) Labs: none needed, recently obtained 8/13/19    3) Therapy:     4) Medical:    - Pt will f/u with other providers as needed    5) Other: Support as needed    6) Follow up:   - Follow up in 4 weeks   - Patient will call if issues or concerns     7) Treatment Plan:    - treatment plan NOT enacted due to extensive counseling today; will complete in future appointment    Discussed self monitoring of symptoms, and symptom monitoring tools  Patient has been informed of 24 hours and weekend coverage for urgent situations accessed by calling the main clinic phone number or calling 911 or getting to ED for immediate medical attention or suicidality        LIBRADO Mandujano  9/5/2019

## 2019-09-05 PROBLEM — G31.84 MCI (MILD COGNITIVE IMPAIRMENT): Status: ACTIVE | Noted: 2019-09-05

## 2019-09-05 NOTE — PSYCH
Treatment plan not completed within required time limits due to: counseling was provided during the session today that took most of the office visit and will do at next office visit  Counseling included disease process as well as medication education

## 2019-09-13 ENCOUNTER — TELEPHONE (OUTPATIENT)
Dept: PSYCHIATRY | Facility: CLINIC | Age: 84
End: 2019-09-13

## 2019-09-13 RX ORDER — CLORAZEPATE DIPOTASSIUM 3.75 MG/1
3.75 TABLET ORAL 2 TIMES DAILY
Refills: 0 | Status: CANCELLED | OUTPATIENT
Start: 2019-09-13

## 2019-09-14 DIAGNOSIS — F41.1 GAD (GENERALIZED ANXIETY DISORDER): Primary | ICD-10-CM

## 2019-09-14 RX ORDER — CLORAZEPATE DIPOTASSIUM 3.75 MG/1
3.75 TABLET ORAL
Qty: 30 TABLET | Refills: 0 | Status: SHIPPED | OUTPATIENT
Start: 2019-09-14 | End: 2019-11-26

## 2019-10-01 ENCOUNTER — OFFICE VISIT (OUTPATIENT)
Dept: PSYCHIATRY | Facility: CLINIC | Age: 84
End: 2019-10-01
Payer: MEDICARE

## 2019-10-01 DIAGNOSIS — F41.1 GAD (GENERALIZED ANXIETY DISORDER): Primary | ICD-10-CM

## 2019-10-01 PROCEDURE — 99214 OFFICE O/P EST MOD 30 MIN: CPT | Performed by: NURSE PRACTITIONER

## 2019-10-01 RX ORDER — SERTRALINE HYDROCHLORIDE 25 MG/1
25 TABLET, FILM COATED ORAL DAILY
Qty: 30 TABLET | Refills: 1 | Status: SHIPPED | OUTPATIENT
Start: 2019-10-01 | End: 2019-10-03 | Stop reason: CLARIF

## 2019-10-01 NOTE — PSYCH
MEDICATION MANAGEMENT NOTE        [facility] [address]      Name and Date of Birth:  Penny Funes  80 y o  1935 MRN: [id number]    Date of Visit: October 1, 2019    SUBJECTIVE:    Oswaldo Paulino is seen today for a follow up for Generalized Anxiety Disorder  He has about two weeks left of taper off of Clorazepate 3 75 mg and started Zoloft last month  Today appears to be clearer with his current medication regimen  He has moved to [facility] in [place]  He is doing very well in the independent living situation; he has been there for a few days so far  He has been eating well and sleeping overall okay; adjusting to his new setting  He has been tolerating his medication changes well, with a decrease in stomach irritation          HPI ROS:             ('was' notes: recent => remote)  Medication Side Effects:  none  none   Depression (10 worst): 0/10 Was 0/10 (per patient)   Anxiety (10 worst): 0/10 Was 0/10 (per patient)   Safety concerns (SI, HI, etc): denied Was denied   Sleep: 'okay'; adjusting to th Was adequate with new mattress   Energy: Good, normal Was good, naps when he needs to   Appetite: good Was good, has meals on wheels   Weight Change: none none       Review Of Systems:      Constitutional negative   Cardiovascular negative   Respiratory negative   Gastrointestinal negative   Musculoskeletal negative   Neurological negative   Endocrine negative       The following portions of the patient's history were reviewed and updated as appropriate: past family history, past medical history, past social history, past surgical history and problem list     Past Psychiatric History:     Previous diagnoses include none  Prior outpatient psychiatric treatment:none  Prior therapy: none  Prior inpatient psychiatric treatment: none  Prior suicide attempts: none  Prior self harm: none  Prior violence or aggression: none     Previous psychotropic medication trials: Doxepin 10 mg TID as needed, recently stopped by PCP  Chlorazepate (benzodiazepine, currently 3 275 mg TID)      Past Medical History:    Past Medical History:   Diagnosis Date    Bladder cancer ([facility] ) 2016    Bladder cancer ([facility] ) 2016    Enlarged prostate     Hyperlipidemia      No past medical history pertinent negatives    Past Surgical History:   Procedure Laterality Date    BLADDER SURGERY  2016    bladder cancer    KNEE SURGERY       No Known Allergies    Substance Abuse History:    Social History     Substance and Sexual Activity   Alcohol Use No     Social History     Substance and Sexual Activity   Drug Use No       Social History:    Social History     Socioeconomic History    Marital status:      Spouse name: Not on file    Number of children: Not on file    Years of education: Not on file    Highest education level: Not on file   Occupational History    Not on file   Social Needs    Financial resource strain: Not on file    Food insecurity:     Worry: Not on file     Inability: Not on file    Transportation needs:     Medical: Not on file     Non-medical: Not on file   Tobacco Use    Smoking status: Former Smoker    Smokeless tobacco: Never Used   Substance and Sexual Activity    Alcohol use: No    Drug use: No    Sexual activity: Not on file   Lifestyle    Physical activity:     Days per week: Not on file     Minutes per session: Not on file    Stress: Not on file   Relationships    Social connections:     Talks on phone: Not on file     Gets together: Not on file     Attends Mu-ism service: Not on file     Active member of club or organization: Not on file     Attends meetings of clubs or organizations: Not on file     Relationship status: Not on file    Intimate partner violence:     Fear of current or ex partner: Not on file     Emotionally abused: Not on file     Physically abused: Not on file     Forced sexual activity: Not on file   Other Topics Concern    Not on file   Social History Narrative    Not on file       Family Psychiatric History:     No family history on file  OBJECTIVE:     Vital signs in last 24 hours: There were no vitals filed for this visit  Mental Status Evaluation:    Appearance age appropriate, casually dressed   Behavior cooperative, calm   Speech normal rate, normal volume, normal pitch   Mood normal   Affect normal range and intensity, appropriate   Thought Processes organized, goal directed   Associations intact associations   Thought Content no overt delusions   Perceptual Disturbances: no auditory hallucinations, no visual hallucinations   Abnormal Thoughts  Risk Potential Suicidal ideation - None  Homicidal ideation - None  Potential for aggression - No   Orientation oriented to person and situation   Memory long term memory mildly impaired, short term memory mildly impaired   Consciousness alert and awake   Attention Span Concentration Span attention span and concentration are age appropriate   Intellect appears to be of average intelligence   Insight intact   Judgement intact   Muscle Strength and  Gait normal muscle strength and normal muscle tone, normal gait and normal balance   Motor activity no abnormal movements   Language no difficulty naming common objects, no difficulty repeating a phrase, no difficulty writing a sentence   Fund of Knowledge adequate fund of knowledge regarding past history  adequate fund of knowledge regarding vocabulary    Pain none   Pain Scale 0       Laboratory Results:   I have personally reviewed all pertinent laboratory/tests results  Recent Labs (last 2 months):    Admission on 08/13/2019, Discharged on 08/13/2019   Component Date Value    WBC 08/13/2019 5 16     RBC 08/13/2019 5 02     Hemoglobin 08/13/2019 14 9     Hematocrit 08/13/2019 45 8     MCV 08/13/2019 91     MCH 08/13/2019 29 7     MCHC 08/13/2019 32 5     RDW 08/13/2019 12 6     MPV 08/13/2019 10 4     Platelets 08/13/2019 200     nRBC 08/13/2019 0     Neutrophils Relative 08/13/2019 66     Immat GRANS % 08/13/2019 0     Lymphocytes Relative 08/13/2019 21     Monocytes Relative 08/13/2019 8     Eosinophils Relative 08/13/2019 4     Basophils Relative 08/13/2019 1     Neutrophils Absolute 08/13/2019 3 45     Immature Grans Absolute 08/13/2019 0 01     Lymphocytes Absolute 08/13/2019 1 08     Monocytes Absolute 08/13/2019 0 41     Eosinophils Absolute 08/13/2019 0 18     Basophils Absolute 08/13/2019 0 03     Sodium 08/13/2019 143     Potassium 08/13/2019 3 9     Chloride 08/13/2019 106     CO2 08/13/2019 31     ANION GAP 08/13/2019 6     BUN 08/13/2019 13     Creatinine 08/13/2019 0 99     Glucose 08/13/2019 108     Calcium 08/13/2019 9 0     eGFR 08/13/2019 70     Color, UA 08/13/2019 Yellow     Clarity, UA 08/13/2019 Clear     Specific Gravity, UA 08/13/2019 1 010     pH, UA 08/13/2019 7 0     Leukocytes, UA 08/13/2019 Negative     Nitrite, UA 08/13/2019 Negative     Protein, UA 08/13/2019 Negative     Glucose, UA 08/13/2019 Negative     Ketones, UA 08/13/2019 Negative     Urobilinogen, UA 08/13/2019 1 0     Bilirubin, UA 08/13/2019 Negative     Blood, UA 08/13/2019 Negative     Troponin I 08/13/2019 <0 02     Ventricular Rate 08/13/2019 72     Atrial Rate 08/13/2019 72     MA Interval 08/13/2019 202     QRSD Interval 08/13/2019 140     QT Interval 08/13/2019 422     QTC Interval 08/13/2019 462     P Axis 08/13/2019 47     QRS [place] 08/13/2019 -64     T Wave [place] 08/13/2019 44    Admission on 08/12/2019, Discharged on 08/12/2019   Component Date Value    WBC 08/12/2019 6 57     RBC 08/12/2019 4 67     Hemoglobin 08/12/2019 14 2     Hematocrit 08/12/2019 42 6     MCV 08/12/2019 91     MCH 08/12/2019 30 4     MCHC 08/12/2019 33 3     RDW 08/12/2019 12 6     MPV 08/12/2019 10 4     Platelets 04/54/4057 196     nRBC 08/12/2019 0     Neutrophils Relative 08/12/2019 72     Immat GRANS % 08/12/2019 0     Lymphocytes Relative 08/12/2019 16     Monocytes Relative 08/12/2019 8     Eosinophils Relative 08/12/2019 3     Basophils Relative 08/12/2019 1     Neutrophils Absolute 08/12/2019 4 78     Immature Grans Absolute 08/12/2019 0 01     Lymphocytes Absolute 08/12/2019 1 02     Monocytes Absolute 08/12/2019 0 51     Eosinophils Absolute 08/12/2019 0 21     Basophils Absolute 08/12/2019 0 04     Sodium 08/12/2019 143     Potassium 08/12/2019 4 3     Chloride 08/12/2019 105     CO2 08/12/2019 30     ANION GAP 08/12/2019 8     BUN 08/12/2019 16     Creatinine 08/12/2019 0 95     Glucose 08/12/2019 98     Calcium 08/12/2019 9 1     AST 08/12/2019 26     ALT 08/12/2019 22     Alkaline Phosphatase 08/12/2019 55     Total Protein 08/12/2019 7 3     Albumin 08/12/2019 3 7     Total Bilirubin 08/12/2019 1 00     eGFR 08/12/2019 73     Troponin I 08/12/2019 <0 02     TSH 3RD GENERATON 08/12/2019 1 785     Color, UA 08/12/2019 Yellow     Clarity, UA 08/12/2019 Clear     Specific Gravity, UA 08/12/2019 1 015     pH, UA 08/12/2019 7 0     Leukocytes, UA 08/12/2019 Negative     Nitrite, UA 08/12/2019 Negative     Protein, UA 08/12/2019 Negative     Glucose, UA 08/12/2019 Negative     Ketones, UA 08/12/2019 15 (1+)*    Urobilinogen, UA 08/12/2019 2 0*    Bilirubin, UA 08/12/2019 Negative     Blood, UA 08/12/2019 Negative     Magnesium 08/12/2019 2 4     Ventricular Rate 08/12/2019 88     Atrial Rate 08/12/2019 88     [place] Interval 08/12/2019 196     QRSD Interval 08/12/2019 144     QT Interval 08/12/2019 402     QTC Interval 08/12/2019 486     P Axis 08/12/2019 75     QRS Axis 08/12/2019 -67     T Wave Axis 08/12/2019 88        No results found for this or any previous visit  Confidential Assessment:  Patient's anxiety has decreased and he is adjusting well with his move    I do notice mild sexual discussions mixed into our conversations (ie: 'one of the old ladies came right up to me and wanted to kiss me, but we both have our own rooms")  It does not appear to be detrimental to his progress and I discussed with Osman Gomez, his son that is present, to call if we need to see his dad sooner  Scales:  Depression: 0/10  Anxiety: 0/10          Assessment/Plan:       Diagnoses and all orders for this visit:    LUCIA (generalized anxiety disorder)          Treatment Recommendations/Precautions/Plan:    Patient has been educated about their diagnosis and treatment modalities  They voiced understanding and agreement with the following plan: Increase Zoloft to 25 mg  Clorazepate will be tapered fully in 2 5 weeks    -Discussed self monitoring of symptoms, and symptom monitoring tools     -Patient has been informed of 24 hours and weekend coverage for urgent situations accessed by calling the main clinic phone number      -Completed and signed during the session: Yes - with Harrison Pinzon    Risks/Benefits      Risks, Benefits And Possible Side Effects Of Medications:    Risks, benefits, and possible side effects of medications explained to Harrison Pinzon and he verbalizes understanding and agreement for treatment  Controlled Medication Discussion:     Harrison Pinzon has been filling controlled prescriptions on time as prescribed according to [facility]    Psychotherapy Provided:     Individual psychotherapy provided: Recent stressors discussed with Harrison Pinzon including his move from [facility] to an independent living facility       LIBRADO Stubbs 10/01/19

## 2019-10-01 NOTE — BH TREATMENT PLAN
TREATMENT PLAN (Medication Management Only)        [facility]    Name and Date of Birth:  Melissa Perez  80 y o  1935  Date of Treatment Plan: October 1, 2019  Diagnosis/Diagnoses:    1  LUCIA (generalized anxiety disorder)      Strengths/Personal Resources for Self-Care: "on time, father, hard-worker"  Area/Areas of need (in own words): anxiety symptoms, depressive symptoms, sleep problems  1  Long Term Goal: continue improvement in acceptable anxiety level  Target Date: 2 months - 12/1/2019  Person/Persons responsible for completion of goal: LIBRADO Gomez  2  Short Term Objective (s) - How will we reach this goal?  A  Provider new recommended medication/dosage changes and/or continue medication(s): continue current medications as prescribed Zoloft  B  N/A   C  N/A  Target Date: 2 months - 12/1/2019  Person/Persons Responsible for Completion of Goal: Marie Cordero  Progress Towards Goals: continuing treatment  Treatment Modality: medication management every 6 week  Review due 90 to 120 days from date of this plan: 4 months - 2/1/2020  Expected length of service: ongoing treatment  My Physician/PA/NP and I have developed this plan together and I agree to work on the goals and objectives  I understand the treatment goals that were developed for my treatment

## 2019-10-03 DIAGNOSIS — F41.1 GAD (GENERALIZED ANXIETY DISORDER): Primary | ICD-10-CM

## 2019-10-03 RX ORDER — SERTRALINE HYDROCHLORIDE 25 MG/1
25 TABLET, FILM COATED ORAL DAILY
Qty: 30 TABLET | Refills: 2 | Status: SHIPPED | OUTPATIENT
Start: 2019-10-03 | End: 2020-01-23 | Stop reason: SDUPTHER

## 2019-11-26 ENCOUNTER — OFFICE VISIT (OUTPATIENT)
Dept: PSYCHIATRY | Facility: CLINIC | Age: 84
End: 2019-11-26
Payer: MEDICARE

## 2019-11-26 DIAGNOSIS — F33.41 RECURRENT MAJOR DEPRESSIVE DISORDER, IN PARTIAL REMISSION (HCC): Primary | ICD-10-CM

## 2019-11-26 PROCEDURE — 99214 OFFICE O/P EST MOD 30 MIN: CPT | Performed by: NURSE PRACTITIONER

## 2019-11-26 NOTE — PSYCH
MEDICATION MANAGEMENT NOTE        [facility]  [address]      Name and Date of Birth:  Any Early  80 y o  1935 MRN: [id number]    Date of Visit: November 26, 2019    SUBJECTIVE:    Robert Winslow is seen today for a follow up for MDD, LUCIA, and neurocognitive disorder  He is overall doing well mentally and physically and seems to have fully adjusted to his new apartment at [facility] in [place]  He is attending daily activities, eating well and sleeping well  There are no concerns made by his son who his present in regards to regression, increased sexual disinhibition (continues to make references to love and kissing, but nothing more)  Anxiety seems to have decreased as he is not calling his son in the morning has he had in the past and is not waking with GI upset  He was initially confused with his new medication planner and was mixed up with his medications/time to take them, but has since adjusted and is doing well with them  HPI ROS:             ('was' notes: recent => remote)  Medication Side Effects:    denied   Depression (10 worst): 0/10 Was 0/10   Anxiety (10 worst): 0/10 Was 0/10   Safety concerns (SI, HI, etc): denied Was denied   Sleep:  Increased healthfully Was ok, adjusting   Energy: Good, normal Was good, normal   Appetite: good Was good   Weight Change: none none       Review Of Systems:      Constitutional negative   Cardiovascular negative   Respiratory negative   Gastrointestinal negative   Musculoskeletal negative   Neurological negative   Endocrine negative       The following portions of the patient's history were reviewed and updated as appropriate: past family history, past medical history, past social history, past surgical history and problem list     Past Psychiatric History:     Previous diagnoses include none  Prior outpatient psychiatric treatment:none  Prior therapy: none  Prior inpatient psychiatric treatment: none  Prior suicide attempts: none  Prior self harm: none  Prior violence or aggression: none     Previous psychotropic medication trials:   Doxepin 10 mg TID as needed, recently stopped by PCP   Chlorazepate (benzodiazepine, currently 3 275 mg TID)      Past Medical History:    Past Medical History:   Diagnosis Date    Bladder cancer ([facility] ) 2016    Bladder cancer ([facility] ) 2016    Enlarged prostate     Hyperlipidemia      No past medical history pertinent negatives    Past Surgical History:   Procedure Laterality Date    BLADDER SURGERY  2016    bladder cancer    KNEE SURGERY       No Known Allergies    Substance Abuse History:    Social History     Substance and Sexual Activity   Alcohol Use No     Social History     Substance and Sexual Activity   Drug Use No       Social History:    Social History     Socioeconomic History    Marital status:      Spouse name: Not on file    Number of children: Not on file    Years of education: Not on file    Highest education level: Not on file   Occupational History    Not on file   Social Needs    Financial resource strain: Not on file    Food insecurity:     Worry: Not on file     Inability: Not on file    Transportation needs:     Medical: Not on file     Non-medical: Not on file   Tobacco Use    Smoking status: Former Smoker    Smokeless tobacco: Never Used   Substance and Sexual Activity    Alcohol use: No    Drug use: No    Sexual activity: Not on file   Lifestyle    Physical activity:     Days per week: Not on file     Minutes per session: Not on file    Stress: Not on file   Relationships    Social connections:     Talks on phone: Not on file     Gets together: Not on file     Attends Hoahaoism service: Not on file     Active member of club or organization: Not on file     Attends meetings of clubs or organizations: Not on file     Relationship status: Not on file    Intimate partner violence:     Fear of current or ex partner: Not on file     Emotionally abused: Not on file     Physically abused: Not on file     Forced sexual activity: Not on file   Other Topics Concern    Not on file   Social History Narrative    Not on file       Family Psychiatric History:     No family history on file  OBJECTIVE:     Vital signs in last 24 hours: There were no vitals filed for this visit  Mental Status Evaluation:    Appearance age appropriate, casually dressed   Behavior cooperative, calm   Speech normal rate, normal volume, normal pitch   Mood normal   Affect normal range and intensity, appropriate   Thought Processes organized, goal directed   Associations intact associations   Thought Content no overt delusions   Perceptual Disturbances: no auditory hallucinations, no visual hallucinations   Abnormal Thoughts  Risk Potential Suicidal ideation - None  Homicidal ideation - None  Potential for aggression - No   Orientation oriented to person, place and time/date   Memory recent memory impaired   Consciousness alert and awake   Attention Span Concentration Span attention span and concentration are age appropriate   Intellect appears to be of average intelligence   Insight intact   Judgement intact   Muscle Strength and  Gait normal muscle strength and normal muscle tone, normal gait and normal balance   Motor activity no abnormal movements   Language no difficulty naming common objects, no difficulty repeating a phrase, no difficulty writing a sentence   Fund of Knowledge adequate knowledge of current events  adequate fund of knowledge regarding past history  adequate fund of knowledge regarding vocabulary    Pain none   Pain Scale 0       Laboratory Results:   I have personally reviewed all pertinent laboratory/tests results  Recent Labs (last 4 months):    Admission on 08/13/2019, Discharged on 08/13/2019   Component Date Value    WBC 08/13/2019 5 16     RBC 08/13/2019 5 02     Hemoglobin 08/13/2019 14 9     Hematocrit 08/13/2019 45 8     MCV 08/13/2019 91     MCH 08/13/2019 29 7     MCHC 08/13/2019 32 5     RDW 08/13/2019 12 6     MPV 08/13/2019 10 4     Platelets 00/65/4808 200     nRBC 08/13/2019 0     Neutrophils Relative 08/13/2019 66     Immat GRANS % 08/13/2019 0     Lymphocytes Relative 08/13/2019 21     Monocytes Relative 08/13/2019 8     Eosinophils Relative 08/13/2019 4     Basophils Relative 08/13/2019 1     Neutrophils Absolute 08/13/2019 3 45     Immature Grans Absolute 08/13/2019 0 01     Lymphocytes Absolute 08/13/2019 1 08     Monocytes Absolute 08/13/2019 0 41     Eosinophils Absolute 08/13/2019 0 18     Basophils Absolute 08/13/2019 0 03     Sodium 08/13/2019 143     Potassium 08/13/2019 3 9     Chloride 08/13/2019 106     CO2 08/13/2019 31     ANION GAP 08/13/2019 6     BUN 08/13/2019 13     Creatinine 08/13/2019 0 99     Glucose 08/13/2019 108     Calcium 08/13/2019 9 0     eGFR 08/13/2019 70     Color, UA 08/13/2019 Yellow     Clarity, UA 08/13/2019 Clear     Specific Gravity, UA 08/13/2019 1 010     pH, UA 08/13/2019 7 0     Leukocytes, UA 08/13/2019 Negative     Nitrite, UA 08/13/2019 Negative     Protein, UA 08/13/2019 Negative     Glucose, UA 08/13/2019 Negative     Ketones, UA 08/13/2019 Negative     Urobilinogen, UA 08/13/2019 1 0     Bilirubin, UA 08/13/2019 Negative     Blood, UA 08/13/2019 Negative     Troponin I 08/13/2019 <0 02     Ventricular Rate 08/13/2019 72     Atrial Rate 08/13/2019 72     OR Interval 08/13/2019 202     QRSD Interval 08/13/2019 140     QT Interval 08/13/2019 422     QTC Interval 08/13/2019 462     P Axis 08/13/2019 47     QRS [place] 08/13/2019 -64     T Wave [place] 08/13/2019 44    Admission on 08/12/2019, Discharged on 08/12/2019   Component Date Value    WBC 08/12/2019 6 57     RBC 08/12/2019 4 67     Hemoglobin 08/12/2019 14 2     Hematocrit 08/12/2019 42 6     MCV 08/12/2019 91     MCH 08/12/2019 30 4     MCHC 08/12/2019 33 3  RDW 08/12/2019 12 6     MPV 08/12/2019 10 4     Platelets 76/77/5277 196     nRBC 08/12/2019 0     Neutrophils Relative 08/12/2019 72     Immat GRANS % 08/12/2019 0     Lymphocytes Relative 08/12/2019 16     Monocytes Relative 08/12/2019 8     Eosinophils Relative 08/12/2019 3     Basophils Relative 08/12/2019 1     Neutrophils Absolute 08/12/2019 4 78     Immature Grans Absolute 08/12/2019 0 01     Lymphocytes Absolute 08/12/2019 1 02     Monocytes Absolute 08/12/2019 0 51     Eosinophils Absolute 08/12/2019 0 21     Basophils Absolute 08/12/2019 0 04     Sodium 08/12/2019 143     Potassium 08/12/2019 4 3     Chloride 08/12/2019 105     CO2 08/12/2019 30     ANION GAP 08/12/2019 8     BUN 08/12/2019 16     Creatinine 08/12/2019 0 95     Glucose 08/12/2019 98     Calcium 08/12/2019 9 1     AST 08/12/2019 26     ALT 08/12/2019 22     Alkaline Phosphatase 08/12/2019 55     Total Protein 08/12/2019 7 3     Albumin 08/12/2019 3 7     Total Bilirubin 08/12/2019 1 00     eGFR 08/12/2019 73     Troponin I 08/12/2019 <0 02     TSH 3RD GENERATON 08/12/2019 1 785     Color, UA 08/12/2019 Yellow     Clarity, UA 08/12/2019 Clear     Specific Gravity, UA 08/12/2019 1 015     pH, UA 08/12/2019 7 0     Leukocytes, UA 08/12/2019 Negative     Nitrite, UA 08/12/2019 Negative     Protein, UA 08/12/2019 Negative     Glucose, UA 08/12/2019 Negative     Ketones, UA 08/12/2019 15 (1+)*    Urobilinogen, UA 08/12/2019 2 0*    Bilirubin, UA 08/12/2019 Negative     Blood, UA 08/12/2019 Negative     Magnesium 08/12/2019 2 4     Ventricular Rate 08/12/2019 88     Atrial Rate 08/12/2019 88     CA Interval 08/12/2019 196     QRSD Interval 08/12/2019 144     QT Interval 08/12/2019 402     QTC Interval 08/12/2019 486     P Axis 08/12/2019 75     QRS Axis 08/12/2019 -67     T Wave Axis 08/12/2019 88        No results found for this or any previous visit        Confidential Assessment:  Overall doing well; has adjusted fully to new home  Anxiety decreased immensely  Will continue current regimen  Scales:  Depression: 0/10  Anxiety: 0/10         Assessment/Plan:       Diagnoses and all orders for this visit:    Recurrent major depressive disorder, in partial remission ([facility] )          Treatment Recommendations/Precautions/Plan:    Continue Zoloft 25 mg daily  Medication management every 6 weeks  Aware of 24 hour and weekend coverage for urgent situations accessed by calling [facility] main practice number    -Discussed self monitoring of symptoms, and symptom monitoring tools     -Patient has been informed of 24 hours and weekend coverage for urgent situations accessed by calling the main clinic phone number      -Completed and signed during the session: Not applicable - Treatment Plan not due at this session    Risks/Benefits      Risks, Benefits And Possible Side Effects Of Medications:    Risks, benefits, and possible side effects of medications explained to Ted Zuluaga and he verbalizes understanding and agreement for treatment  Controlled Medication Discussion:     Not applicable    Psychotherapy Provided:     Individual psychotherapy provided: Yes  Medication education provided to Ted Zuluaga  Coping mechanisms including playing cards, talking with family, taking a hot shower reviewed with LIBRADO Castro 11/26/19

## 2020-01-23 DIAGNOSIS — F41.1 GAD (GENERALIZED ANXIETY DISORDER): ICD-10-CM

## 2020-01-23 RX ORDER — SERTRALINE HYDROCHLORIDE 25 MG/1
25 TABLET, FILM COATED ORAL DAILY
Qty: 90 TABLET | Refills: 1 | Status: SHIPPED | OUTPATIENT
Start: 2020-01-23 | End: 2020-07-02 | Stop reason: SDUPTHER

## 2020-01-23 RX ORDER — SERTRALINE HYDROCHLORIDE 25 MG/1
25 TABLET, FILM COATED ORAL DAILY
Qty: 90 TABLET | Refills: 1 | Status: SHIPPED | OUTPATIENT
Start: 2020-01-23 | End: 2020-01-23 | Stop reason: SDUPTHER

## 2020-01-23 NOTE — TELEPHONE ENCOUNTER
Son called  Pt in nursing home ans wanted to see if he could get the zoloft for his dad refilled and more than a month at a time    I changed the days on the refill to 90

## 2020-07-02 DIAGNOSIS — F41.1 GAD (GENERALIZED ANXIETY DISORDER): ICD-10-CM

## 2020-07-02 RX ORDER — SERTRALINE HYDROCHLORIDE 25 MG/1
25 TABLET, FILM COATED ORAL DAILY
Qty: 90 TABLET | Refills: 1 | Status: SHIPPED | OUTPATIENT
Start: 2020-07-02 | End: 2020-12-29

## 2020-12-14 DIAGNOSIS — F41.1 GAD (GENERALIZED ANXIETY DISORDER): ICD-10-CM

## 2020-12-14 RX ORDER — SERTRALINE HYDROCHLORIDE 25 MG/1
25 TABLET, FILM COATED ORAL DAILY
Qty: 90 TABLET | Refills: 1 | OUTPATIENT
Start: 2020-12-14 | End: 2021-06-12

## 2023-02-27 PROBLEM — Z85.51 HISTORY OF BLADDER CANCER: Status: ACTIVE | Noted: 2023-02-27

## 2023-03-29 ENCOUNTER — OFFICE VISIT (OUTPATIENT)
Dept: UROLOGY | Facility: CLINIC | Age: 88
End: 2023-03-29

## 2023-03-29 DIAGNOSIS — R97.20 ELEVATED PSA: Primary | ICD-10-CM

## 2023-03-29 DIAGNOSIS — Z85.51 HISTORY OF BLADDER CANCER: ICD-10-CM

## 2023-03-29 LAB
SL AMB  POCT GLUCOSE, UA: ABNORMAL
SL AMB LEUKOCYTE ESTERASE,UA: ABNORMAL
SL AMB POCT BILIRUBIN,UA: ABNORMAL
SL AMB POCT BLOOD,UA: ABNORMAL
SL AMB POCT CLARITY,UA: CLEAR
SL AMB POCT COLOR,UA: ABNORMAL
SL AMB POCT KETONES,UA: ABNORMAL
SL AMB POCT NITRITE,UA: ABNORMAL
SL AMB POCT PH,UA: 5.5
SL AMB POCT SPECIFIC GRAVITY,UA: 1.03
SL AMB POCT URINE PROTEIN: ABNORMAL
SL AMB POCT UROBILINOGEN: 1

## 2023-03-29 RX ORDER — OMEGA-3S/DHA/EPA/FISH OIL/D3 300MG-1000
400 CAPSULE ORAL DAILY
COMMUNITY

## 2023-03-29 RX ORDER — DONEPEZIL HYDROCHLORIDE 5 MG/1
5 TABLET, FILM COATED ORAL
COMMUNITY

## 2023-03-29 NOTE — PROGRESS NOTES
UROLOGY PROGRESS NOTE         NAME: Femi Guerra Sr   AGE: 80 y o  SEX: male  : 1935   MRN: [id number]    DATE: 3/29/2023  TIME: 2:04 PM    Assessment and Plan      Impression:   1  Elevated PSA  -     POCT urine dip auto non-scope  -     PSA Total, Diagnostic; Future    2  History of bladder cancer       Plan: I discussed the findings with the son and patient  PSAs can be variable  The patient is 80 and has some dementia  His urinalysis today did not reveal any gross evidence of infection and there was no blood  He has a remote history of bladder cancer and surveillance was discontinued to about 2 years ago  I recommended a repeat PSA and we will see him back in about 3 months  He is voiding well at this point  I think the salient points presently are that he is voiding well and has no significant symptoms  I did explain that prostate cancer is a possibility  Chief Complaint     Chief Complaint   Patient presents with   • new pt     No symptoms today       History of Present Illness     HPI: Femi Guerra Sr  is a 80y o  year old male who presents with history of an elevated PSA of 11 2  Patient on medication for BPH with finasteride and tamsulosin  PSA was 3 24 back in 2021  Patient lives at an extended care facility  Also has a previous history of bladder cancer but unable to find record related to this  Patient has a history of some dementia  His son is with him for the visit today  The PSA blood test which was elevated is from January                The following portions of the patient's history were reviewed and updated as appropriate: allergies, current medications, past family history, past medical history, past social history, past surgical history and problem list   Past Medical History:   Diagnosis Date   • Anxiety    • Benign prostatic hyperplasia    • Bladder cancer ([facility] ) 2016   • Bladder cancer ([facility] ) 2016   • Dementia ([place] [address] )    • Depression    • Enlarged prostate    • Hyperlipidemia      Past Surgical History:   Procedure Laterality Date   • BLADDER SURGERY  2016    bladder cancer   • KNEE SURGERY       shoulder  Review of Systems     Const: Denies chills, fever and weight loss  CV: Denies chest pain  Resp: Denies SOB  GI: Denies abdominal pain, nausea and vomiting  : Denies symptoms other than stated above  Musculo: Denies back pain  Objective   There were no vitals taken for this visit  Physical Exam  Const: Appears healthy and well developed  No signs of acute distress present  Resp: Respirations are regular and unlabored  CV: Rate is regular  Rhythm is regular  Abdomen: Abdomen is soft, nontender, and nondistended  Kidneys are not palpable  : On exam he has phimosis  The foreskin is not retractile  He is uncircumcised the remainder of the genitalia exam is normal there is no hernia  The prostate is 2-3+ there is mild irregularity but no hard areas  There are no hard nodules  There is no tenderness  Psych: Patient's attitude is cooperative   Mood is normal  Affect is normal     Procedure   Procedures     Current Medications     Current Outpatient Medications:   •  cholecalciferol (VITAMIN D3) 400 units tablet, Take 400 Units by mouth daily, Disp: , Rfl:   •  docusate sodium (COLACE) 100 mg capsule, Take 1 capsule by mouth every 12 (twelve) hours, Disp: 60 capsule, Rfl: 0  •  donepezil (ARICEPT) 5 mg tablet, Take 5 mg by mouth daily at bedtime, Disp: , Rfl:   •  finasteride (PROSCAR) 5 mg tablet, Take 5 mg by mouth daily, Disp: , Rfl:   •  sertraline (ZOLOFT) 25 mg tablet, Take 1 tablet (25 mg total) by mouth daily, Disp: 90 tablet, Rfl: 1  •  simvastatin (ZOCOR) 40 mg tablet, Take 40 mg by mouth daily at bedtime, Disp: , Rfl:   •  tamsulosin (FLOMAX) 0 4 mg, Take 0 4 mg by mouth daily with dinner, Disp: , Rfl:   •  acetaminophen (TYLENOL) 325 mg tablet, Take 2 tablets by mouth every 6 (six) hours as needed for mild pain or fever (Patient not taking: Reported on 8/13/2019), Disp: 30 tablet, Rfl: 0  •  ondansetron (ZOFRAN-ODT) 4 mg disintegrating tablet, Take 1 tablet (4 mg total) by mouth every 8 (eight) hours for 20 doses, Disp: 12 tablet, Rfl: 0        Edward Webber MD

## 2023-12-22 ENCOUNTER — APPOINTMENT (EMERGENCY)
Dept: CT IMAGING | Facility: HOSPITAL | Age: 88
DRG: 069 | End: 2023-12-22
Payer: MEDICARE

## 2023-12-22 ENCOUNTER — APPOINTMENT (INPATIENT)
Dept: RADIOLOGY | Facility: HOSPITAL | Age: 88
DRG: 069 | End: 2023-12-22
Payer: MEDICARE

## 2023-12-22 ENCOUNTER — APPOINTMENT (INPATIENT)
Dept: MRI IMAGING | Facility: HOSPITAL | Age: 88
DRG: 069 | End: 2023-12-22
Payer: MEDICARE

## 2023-12-22 ENCOUNTER — HOSPITAL ENCOUNTER (INPATIENT)
Facility: HOSPITAL | Age: 88
LOS: 5 days | Discharge: NURSING, REHAB OR LONG-TERM CARE | DRG: 069 | End: 2023-12-27
Attending: EMERGENCY MEDICINE | Admitting: INTERNAL MEDICINE
Payer: MEDICARE

## 2023-12-22 DIAGNOSIS — R41.82 ALTERED MENTAL STATUS, UNSPECIFIED ALTERED MENTAL STATUS TYPE: ICD-10-CM

## 2023-12-22 DIAGNOSIS — R60.0 BILATERAL LOWER EXTREMITY EDEMA: ICD-10-CM

## 2023-12-22 DIAGNOSIS — F41.1 GAD (GENERALIZED ANXIETY DISORDER): ICD-10-CM

## 2023-12-22 DIAGNOSIS — R29.810 FACIAL DROOP: Primary | ICD-10-CM

## 2023-12-22 DIAGNOSIS — R29.90 STROKE-LIKE SYMPTOMS: ICD-10-CM

## 2023-12-22 DIAGNOSIS — F03.A0 MILD DEMENTIA WITHOUT BEHAVIORAL DISTURBANCE, PSYCHOTIC DISTURBANCE, MOOD DISTURBANCE, OR ANXIETY, UNSPECIFIED DEMENTIA TYPE (HCC): ICD-10-CM

## 2023-12-22 DIAGNOSIS — I82.451 ACUTE DEEP VEIN THROMBOSIS (DVT) OF RIGHT PERONEAL VEIN (HCC): ICD-10-CM

## 2023-12-22 PROBLEM — G93.41 METABOLIC ENCEPHALOPATHY: Status: ACTIVE | Noted: 2023-12-22

## 2023-12-22 LAB
2HR DELTA HS TROPONIN: 0 NG/L
ALBUMIN SERPL BCP-MCNC: 4.1 G/DL (ref 3.5–5)
ALP SERPL-CCNC: 48 U/L (ref 34–104)
ALT SERPL W P-5'-P-CCNC: 18 U/L (ref 7–52)
ANION GAP SERPL CALCULATED.3IONS-SCNC: 6 MMOL/L
APTT PPP: 27 SECONDS (ref 23–37)
AST SERPL W P-5'-P-CCNC: 40 U/L (ref 13–39)
ATRIAL RATE: 70 BPM
BILIRUB DIRECT SERPL-MCNC: 0.24 MG/DL (ref 0–0.2)
BILIRUB SERPL-MCNC: 0.91 MG/DL (ref 0.2–1)
BILIRUB UR QL STRIP: NEGATIVE
BUN SERPL-MCNC: 18 MG/DL (ref 5–25)
CALCIUM SERPL-MCNC: 9.2 MG/DL (ref 8.4–10.2)
CARDIAC TROPONIN I PNL SERPL HS: 5 NG/L
CARDIAC TROPONIN I PNL SERPL HS: 5 NG/L
CHLORIDE SERPL-SCNC: 104 MMOL/L (ref 96–108)
CLARITY UR: CLEAR
CO2 SERPL-SCNC: 30 MMOL/L (ref 21–32)
COLOR UR: YELLOW
CREAT SERPL-MCNC: 0.87 MG/DL (ref 0.6–1.3)
ERYTHROCYTE [DISTWIDTH] IN BLOOD BY AUTOMATED COUNT: 12.6 % (ref 11.6–15.1)
FLUAV RNA RESP QL NAA+PROBE: NEGATIVE
FLUBV RNA RESP QL NAA+PROBE: NEGATIVE
GFR SERPL CREATININE-BSD FRML MDRD: 77 ML/MIN/1.73SQ M
GLUCOSE SERPL-MCNC: 118 MG/DL (ref 65–140)
GLUCOSE UR STRIP-MCNC: NEGATIVE MG/DL
HCT VFR BLD AUTO: 39.3 % (ref 36.5–49.3)
HGB BLD-MCNC: 13.2 G/DL (ref 12–17)
HGB UR QL STRIP.AUTO: NEGATIVE
INR PPP: 1.01 (ref 0.84–1.19)
KETONES UR STRIP-MCNC: ABNORMAL MG/DL
LEUKOCYTE ESTERASE UR QL STRIP: NEGATIVE
LIPASE SERPL-CCNC: 46 U/L (ref 11–82)
MAGNESIUM SERPL-MCNC: 2.1 MG/DL (ref 1.9–2.7)
MCH RBC QN AUTO: 30.4 PG (ref 26.8–34.3)
MCHC RBC AUTO-ENTMCNC: 33.6 G/DL (ref 31.4–37.4)
MCV RBC AUTO: 91 FL (ref 82–98)
NITRITE UR QL STRIP: NEGATIVE
P AXIS: 112 DEGREES
PH UR STRIP.AUTO: 5 [PH]
PLATELET # BLD AUTO: 199 THOUSANDS/UL (ref 149–390)
PMV BLD AUTO: 10.2 FL (ref 8.9–12.7)
POTASSIUM SERPL-SCNC: 3.6 MMOL/L (ref 3.5–5.3)
PR INTERVAL: 228 MS
PROT SERPL-MCNC: 7.2 G/DL (ref 6.4–8.4)
PROT UR STRIP-MCNC: NEGATIVE MG/DL
PROTHROMBIN TIME: 13.6 SECONDS (ref 11.6–14.5)
QRS AXIS: -57 DEGREES
QRSD INTERVAL: 142 MS
QT INTERVAL: 432 MS
QTC INTERVAL: 466 MS
RBC # BLD AUTO: 4.34 MILLION/UL (ref 3.88–5.62)
RSV RNA RESP QL NAA+PROBE: NEGATIVE
SARS-COV-2 RNA RESP QL NAA+PROBE: NEGATIVE
SODIUM SERPL-SCNC: 140 MMOL/L (ref 135–147)
SP GR UR STRIP.AUTO: 1.02 (ref 1–1.03)
T WAVE AXIS: 72 DEGREES
UROBILINOGEN UR QL STRIP.AUTO: 1 E.U./DL
VENTRICULAR RATE: 70 BPM
WBC # BLD AUTO: 6.27 THOUSAND/UL (ref 4.31–10.16)

## 2023-12-22 PROCEDURE — 99285 EMERGENCY DEPT VISIT HI MDM: CPT

## 2023-12-22 PROCEDURE — 70551 MRI BRAIN STEM W/O DYE: CPT

## 2023-12-22 PROCEDURE — 93005 ELECTROCARDIOGRAM TRACING: CPT

## 2023-12-22 PROCEDURE — 80048 BASIC METABOLIC PNL TOTAL CA: CPT | Performed by: EMERGENCY MEDICINE

## 2023-12-22 PROCEDURE — 71045 X-RAY EXAM CHEST 1 VIEW: CPT

## 2023-12-22 PROCEDURE — 87040 BLOOD CULTURE FOR BACTERIA: CPT | Performed by: EMERGENCY MEDICINE

## 2023-12-22 PROCEDURE — 99285 EMERGENCY DEPT VISIT HI MDM: CPT | Performed by: EMERGENCY MEDICINE

## 2023-12-22 PROCEDURE — 81003 URINALYSIS AUTO W/O SCOPE: CPT | Performed by: EMERGENCY MEDICINE

## 2023-12-22 PROCEDURE — 84484 ASSAY OF TROPONIN QUANT: CPT | Performed by: EMERGENCY MEDICINE

## 2023-12-22 PROCEDURE — 83735 ASSAY OF MAGNESIUM: CPT | Performed by: EMERGENCY MEDICINE

## 2023-12-22 PROCEDURE — 85027 COMPLETE CBC AUTOMATED: CPT | Performed by: EMERGENCY MEDICINE

## 2023-12-22 PROCEDURE — 80076 HEPATIC FUNCTION PANEL: CPT | Performed by: EMERGENCY MEDICINE

## 2023-12-22 PROCEDURE — 87081 CULTURE SCREEN ONLY: CPT

## 2023-12-22 PROCEDURE — 0241U HB NFCT DS VIR RESP RNA 4 TRGT: CPT | Performed by: EMERGENCY MEDICINE

## 2023-12-22 PROCEDURE — 70498 CT ANGIOGRAPHY NECK: CPT

## 2023-12-22 PROCEDURE — 85730 THROMBOPLASTIN TIME PARTIAL: CPT | Performed by: EMERGENCY MEDICINE

## 2023-12-22 PROCEDURE — 70496 CT ANGIOGRAPHY HEAD: CPT

## 2023-12-22 PROCEDURE — 99223 1ST HOSP IP/OBS HIGH 75: CPT

## 2023-12-22 PROCEDURE — 85610 PROTHROMBIN TIME: CPT | Performed by: EMERGENCY MEDICINE

## 2023-12-22 PROCEDURE — 36415 COLL VENOUS BLD VENIPUNCTURE: CPT | Performed by: EMERGENCY MEDICINE

## 2023-12-22 PROCEDURE — 83690 ASSAY OF LIPASE: CPT | Performed by: EMERGENCY MEDICINE

## 2023-12-22 PROCEDURE — 82948 REAGENT STRIP/BLOOD GLUCOSE: CPT

## 2023-12-22 RX ORDER — CEFAZOLIN SODIUM 2 G/50ML
2000 SOLUTION INTRAVENOUS EVERY 8 HOURS
Status: DISCONTINUED | OUTPATIENT
Start: 2023-12-22 | End: 2023-12-24

## 2023-12-22 RX ORDER — LANOLIN ALCOHOL/MO/W.PET/CERES
3 CREAM (GRAM) TOPICAL
Status: DISCONTINUED | OUTPATIENT
Start: 2023-12-22 | End: 2023-12-27 | Stop reason: HOSPADM

## 2023-12-22 RX ORDER — TAMSULOSIN HYDROCHLORIDE 0.4 MG/1
0.4 CAPSULE ORAL
Status: DISCONTINUED | OUTPATIENT
Start: 2023-12-22 | End: 2023-12-27 | Stop reason: HOSPADM

## 2023-12-22 RX ORDER — ASPIRIN 81 MG/1
81 TABLET, CHEWABLE ORAL DAILY
Status: DISCONTINUED | OUTPATIENT
Start: 2023-12-23 | End: 2023-12-27 | Stop reason: HOSPADM

## 2023-12-22 RX ORDER — ASPIRIN 325 MG
325 TABLET ORAL ONCE
Status: COMPLETED | OUTPATIENT
Start: 2023-12-22 | End: 2023-12-22

## 2023-12-22 RX ORDER — ATORVASTATIN CALCIUM 40 MG/1
40 TABLET, FILM COATED ORAL EVERY EVENING
Status: DISCONTINUED | OUTPATIENT
Start: 2023-12-22 | End: 2023-12-27 | Stop reason: HOSPADM

## 2023-12-22 RX ORDER — DONEPEZIL HYDROCHLORIDE 5 MG/1
5 TABLET, FILM COATED ORAL
Status: DISCONTINUED | OUTPATIENT
Start: 2023-12-22 | End: 2023-12-27 | Stop reason: HOSPADM

## 2023-12-22 RX ORDER — HEPARIN SODIUM 5000 [USP'U]/ML
5000 INJECTION, SOLUTION INTRAVENOUS; SUBCUTANEOUS EVERY 8 HOURS SCHEDULED
Status: DISCONTINUED | OUTPATIENT
Start: 2023-12-22 | End: 2023-12-26

## 2023-12-22 RX ORDER — MULTIVIT-MIN/IRON/FOLIC ACID/K 18-600-40
1 CAPSULE ORAL DAILY
COMMUNITY

## 2023-12-22 RX ORDER — FINASTERIDE 5 MG/1
5 TABLET, FILM COATED ORAL DAILY
Status: DISCONTINUED | OUTPATIENT
Start: 2023-12-23 | End: 2023-12-27 | Stop reason: HOSPADM

## 2023-12-22 RX ORDER — LANOLIN ALCOHOL/MO/W.PET/CERES
3 CREAM (GRAM) TOPICAL
COMMUNITY

## 2023-12-22 RX ADMIN — TAMSULOSIN HYDROCHLORIDE 0.4 MG: 0.4 CAPSULE ORAL at 19:45

## 2023-12-22 RX ADMIN — IOHEXOL 85 ML: 350 INJECTION, SOLUTION INTRAVENOUS at 16:14

## 2023-12-22 RX ADMIN — CEFAZOLIN SODIUM 2000 MG: 2 SOLUTION INTRAVENOUS at 19:49

## 2023-12-22 RX ADMIN — DONEPEZIL HYDROCHLORIDE 5 MG: 5 TABLET ORAL at 21:12

## 2023-12-22 RX ADMIN — HEPARIN SODIUM 5000 UNITS: 5000 INJECTION INTRAVENOUS; SUBCUTANEOUS at 21:12

## 2023-12-22 RX ADMIN — ASPIRIN 325 MG ORAL TABLET 325 MG: 325 PILL ORAL at 16:54

## 2023-12-22 RX ADMIN — ATORVASTATIN CALCIUM 40 MG: 40 TABLET, FILM COATED ORAL at 19:45

## 2023-12-22 RX ADMIN — MELATONIN 3 MG: 3 TAB ORAL at 21:12

## 2023-12-22 NOTE — ASSESSMENT & PLAN NOTE
Continue aricept  Currently at baseline-oriented to self and family.  Intermittently oriented to place

## 2023-12-22 NOTE — ASSESSMENT & PLAN NOTE
UA and infectious workup pending at this time   Blood cultures pending   No signs of infection at this time   Rest of plan under stroke like symptoms

## 2023-12-22 NOTE — H&P
[facility]  H&P  Name: Satya Bailey Sr. 88 y.o. male I MRN: [id number]  Unit/Bed#: [facility] Date of Admission: 12/22/2023   Date of Service: 12/22/2023 I Hospital Day: 0      Assessment/Plan   * Stroke-like symptoms  Assessment & Plan  POA - patient from nursing facility and noted to be more confused than normal with left sided facial droop and lean. No last known well  CT head/ CT head and neck without acute ischemia or stenosis  Neuro consulted -recommending aspirin, statin and stroke work up   Permissive htn  Tele, echo   PT/OT  MRI brain pending    Bilateral lower extremity edema  Assessment & Plan  Non pitting edema with erythema to bilateral shins  Will start on ancef for possible cellulitis   Crp pending  MRSA pending    Metabolic encephalopathy  Assessment & Plan  UA and infectious workup pending at this time   Blood cultures pending   No signs of infection at this time   Rest of plan under stroke like symptoms    Mild dementia without behavioral disturbance, psychotic disturbance, mood disturbance, or anxiety (HCC)  Assessment & Plan  Continue aricept  Currently at baseline-oriented to self and family.  Intermittently oriented to place           VTE Pharmacologic Prophylaxis: VTE Score: 4 High Risk (Score >/= 5) - Pharmacological DVT Prophylaxis Ordered: heparin. Sequential Compression Devices Ordered.  Code Status: Level 3 - DNAR and DNI   Discussion with family: Updated  (son and daughter in law) at bedside.    Anticipated Length of Stay: Patient will be admitted on an inpatient basis with an anticipated length of stay of greater than 2 midnights secondary to stroke work up .    Total Time Spent on Date of Encounter in care of patient: 60 mins. This time was spent on one or more of the following: performing physical exam; counseling and coordination of care; obtaining or reviewing history; documenting in the medical record; reviewing/ordering tests, medications  "or procedures; communicating with other healthcare professionals and discussing with patient's family/caregivers.    Chief Complaint: \"I was confused\"    History of Present Illness:  Satya Bailey Sr. is a 88 y.o. male with a PMH of dementia and bladder cancer who presents with altered mental status.  Patient presents from nursing facility with altered mental status and left-sided facial droop with slurred speech.  Sons at bedside and state patient has left sided lean with mild slurred speech and slow to respond orally.  At baseline is only oriented to self and family.  Family reports worsening cognitive decline in the past 6 months as well as physical decline.  No history of stroke.    Review of Systems:  Review of Systems   Constitutional:  Negative for diaphoresis, fatigue and fever.   HENT:  Negative for congestion, sneezing and sore throat.    Respiratory:  Negative for cough, chest tightness, shortness of breath and wheezing.    Cardiovascular:  Positive for leg swelling. Negative for chest pain and palpitations.   Gastrointestinal:  Negative for abdominal distention, abdominal pain, constipation, diarrhea, nausea and vomiting.   Genitourinary:  Negative for difficulty urinating and dysuria.   Musculoskeletal:  Negative for arthralgias, back pain and gait problem.   Neurological:  Positive for facial asymmetry and speech difficulty. Negative for dizziness, tremors, weakness, light-headedness and headaches.   Psychiatric/Behavioral:  Positive for confusion. Negative for agitation and hallucinations.        Past Medical and Surgical History:   Past Medical History:   Diagnosis Date    Anxiety     Benign prostatic hyperplasia     Bladder cancer (HCC) 2016    Bladder cancer (HCC) 2016    Dementia (HCC)     Depression     Enlarged prostate     Hyperlipidemia        Past Surgical History:   Procedure Laterality Date    BLADDER SURGERY  2016    bladder cancer    KNEE SURGERY         Meds/Allergies:  Prior to Admission " medications    Medication Sig Start Date End Date Taking? Authorizing Provider   acetaminophen (TYLENOL) 325 mg tablet Take 2 tablets by mouth every 6 (six) hours as needed for mild pain or fever  Patient not taking: Reported on 8/13/2019 11/1/16   Dev Smith MD   cholecalciferol (VITAMIN D3) 400 units tablet Take 400 Units by mouth daily    Historical Provider, MD   docusate sodium (COLACE) 100 mg capsule Take 1 capsule by mouth every 12 (twelve) hours 7/15/17   Td Burroughs DO   donepezil (ARICEPT) 5 mg tablet Take 5 mg by mouth daily at bedtime    Historical Provider, MD   finasteride (PROSCAR) 5 mg tablet Take 5 mg by mouth daily    Historical Provider, MD   ondansetron (ZOFRAN-ODT) 4 mg disintegrating tablet Take 1 tablet (4 mg total) by mouth every 8 (eight) hours for 20 doses 8/13/19 8/20/19  Sofya Rushing MD   sertraline (ZOLOFT) 25 mg tablet Take 1 tablet (25 mg total) by mouth daily 7/2/20 3/29/23  LIBRADO Dickey   simvastatin (ZOCOR) 40 mg tablet Take 40 mg by mouth daily at bedtime    Historical Provider, MD   tamsulosin (FLOMAX) 0.4 mg Take 0.4 mg by mouth daily with dinner    Historical Provider, MD     I have reviewed home medications with patient personally.    Allergies: No Known Allergies    Social History:  Marital Status:    Patient Pre-hospital Living Situation: Skilled Nursing Facility: [facility]  Patient Pre-hospital Level of Mobility: walks  Patient Pre-hospital Diet Restrictions: none  Substance Use History:   Social History     Substance and Sexual Activity   Alcohol Use Not Currently     Social History     Tobacco Use   Smoking Status Former    Current packs/day: 0.25    Average packs/day: 0.3 packs/day for 35.0 years (8.8 ttl pk-yrs)    Types: Cigarettes   Smokeless Tobacco Never     Social History     Substance and Sexual Activity   Drug Use No       Family History:  Family History   Problem Relation Age of Onset    Diabetes Mother        Physical  Exam:     Vitals:   Blood Pressure: 154/66 (12/22/23 1733)  Pulse: 71 (12/22/23 1733)  Respirations: 17 (12/22/23 1733)  Weight - Scale: 93.7 kg (206 lb 9.1 oz) (12/22/23 1600)  SpO2: 96 % (12/22/23 1733)    Physical Exam  Vitals reviewed.   Constitutional:       General: He is not in acute distress.     Appearance: Normal appearance. He is not ill-appearing.   HENT:      Head: Normocephalic and atraumatic.      Nose: Nose normal.      Mouth/Throat:      Mouth: Mucous membranes are moist.      Pharynx: Oropharynx is clear.   Eyes:      Extraocular Movements: Extraocular movements intact.      Conjunctiva/sclera: Conjunctivae normal.      Pupils: Pupils are equal, round, and reactive to light.   Cardiovascular:      Rate and Rhythm: Normal rate and regular rhythm.      Pulses: Normal pulses.      Heart sounds: Normal heart sounds. No murmur heard.  Pulmonary:      Effort: Pulmonary effort is normal. No respiratory distress.      Breath sounds: Normal breath sounds. No wheezing.   Abdominal:      General: Abdomen is flat. Bowel sounds are normal. There is no distension.      Palpations: Abdomen is soft.      Tenderness: There is no abdominal tenderness. There is no guarding.   Musculoskeletal:         General: Normal range of motion.      Cervical back: Normal range of motion.      Right lower leg: No edema.      Left lower leg: No edema.   Skin:     General: Skin is warm.      Findings: Erythema present.      Comments: Erythema to bilateral lower shins   Neurological:      General: No focal deficit present.      Mental Status: He is alert. Mental status is at baseline.      Motor: No weakness.      Comments: Left foot weak dorsiflexion  All other extremity strength equal and intact   Normal sensation  Mild slurred speech   Mild left sided lean  Slow responses  Oriented to self, place and family. Unable to state year, month or president   Psychiatric:         Mood and Affect: Mood normal.         Behavior: Behavior  normal.         Thought Content: Thought content normal.         Judgment: Judgment normal.          Additional Data:     Lab Results:  Results from last 7 days   Lab Units 12/22/23  1607   WBC Thousand/uL 6.27   HEMOGLOBIN g/dL 13.2   HEMATOCRIT % 39.3   PLATELETS Thousands/uL 199     Results from last 7 days   Lab Units 12/22/23  1607   SODIUM mmol/L 140   POTASSIUM mmol/L 3.6   CHLORIDE mmol/L 104   CO2 mmol/L 30   BUN mg/dL 18   CREATININE mg/dL 0.87   ANION GAP mmol/L 6   CALCIUM mg/dL 9.2   ALBUMIN g/dL 4.1   TOTAL BILIRUBIN mg/dL 0.91   ALK PHOS U/L 48   ALT U/L 18   AST U/L 40*   GLUCOSE RANDOM mg/dL 118     Results from last 7 days   Lab Units 12/22/23  1607   INR  1.01                   Lines/Drains:  Invasive Devices       Peripheral Intravenous Line  Duration             Peripheral IV 12/22/23 Left Antecubital <1 day    Peripheral IV 12/22/23 Right Antecubital <1 day                        Imaging: Reviewed radiology reports from this admission including: chest xray and CT head  XR chest 1 view portable   ED Interpretation by Marc Berman MD (12/22 2885)   Rotated.  No definite infiltrate      CT stroke alert brain   Final Result by Joshua Gallego MD (12/22 1633)      No acute intracranial abnormality.      Findings were directly discussed with Audra Lincoln  at  4:25 PM  .      Workstation performed: [id number]         CTA stroke alert (head/neck)   Final Result by Joshua Gallego MD (12/22 9983)      No hemodynamically significant stenosis, dissection or occlusion of the carotid or vertebral arteries or major vessels of the [place].            Findings were directly discussed with Audra Lincoln  at  4:30 PM  .                           Workstation performed: [id number]         MRI brain wo contrast    (Results Pending)       EKG and Other Studies Reviewed on Admission:   EKG: NSR. HR 70.    ** Please Note: This note has been constructed using a voice recognition system.  **

## 2023-12-22 NOTE — ED PROVIDER NOTES
History  Chief Complaint   Patient presents with    STROKE Alert     Pt presented to this ED via EMS from [facility] stating staff c/o pt slurred speech and facial droop 1hr ago. Last well known approx 1230 at lunchtime.      From a nursing home with a past history of dementia.  Was noticed at 1230 this afternoon not acting normal.  Slightly more confused.  Did have a slight facial droop.  Was leaning more to the left side.  Had 1 episode of incontinence.  No nausea or vomiting.  Patient here denies any chest pain or shortness of breath.  No abdominal pain or back pain.  No headaches.      History provided by:  Patient and EMS personnel  History limited by:  Dementia   used: No    CVA/TIA-like Symptoms  Presenting symptoms: confusion and weakness    Presenting symptoms: no headaches    Onset quality:  Unable to specify  Timing:  Unable to specify  Progression:  Unchanged  Similar to previous episodes: no    Associated symptoms: incontinence    Associated symptoms: no chest pain, no trouble swallowing, no dizziness, no facial pain, no fever, no hearing loss, no nausea, no neck pain, no seizures, no tinnitus, no vertigo and no vomiting        Prior to Admission Medications   Prescriptions Last Dose Informant Patient Reported? Taking?   acetaminophen (TYLENOL) 325 mg tablet   No No   Sig: Take 2 tablets by mouth every 6 (six) hours as needed for mild pain or fever   Patient not taking: Reported on 8/13/2019   cholecalciferol (VITAMIN D3) 400 units tablet   Yes No   Sig: Take 400 Units by mouth daily   docusate sodium (COLACE) 100 mg capsule   No No   Sig: Take 1 capsule by mouth every 12 (twelve) hours   donepezil (ARICEPT) 5 mg tablet   Yes No   Sig: Take 5 mg by mouth daily at bedtime   finasteride (PROSCAR) 5 mg tablet   Yes No   Sig: Take 5 mg by mouth daily   ondansetron (ZOFRAN-ODT) 4 mg disintegrating tablet   No No   Sig: Take 1 tablet (4 mg total) by mouth every 8 (eight)  hours for 20 doses   sertraline (ZOLOFT) 25 mg tablet   No No   Sig: Take 1 tablet (25 mg total) by mouth daily   simvastatin (ZOCOR) 40 mg tablet   Yes No   Sig: Take 40 mg by mouth daily at bedtime   tamsulosin (FLOMAX) 0.4 mg   Yes No   Sig: Take 0.4 mg by mouth daily with dinner      Facility-Administered Medications: None       Past Medical History:   Diagnosis Date    Anxiety     Benign prostatic hyperplasia     Bladder cancer (HCC) 2016    Bladder cancer (HCC) 2016    Dementia (HCC)     Depression     Enlarged prostate     Hyperlipidemia        Past Surgical History:   Procedure Laterality Date    BLADDER SURGERY  2016    bladder cancer    KNEE SURGERY         Family History   Problem Relation Age of Onset    Diabetes Mother      I have reviewed and agree with the history as documented.    E-Cigarette/Vaping    E-Cigarette Use Never User      E-Cigarette/Vaping Substances    Nicotine No     THC No     CBD No     Flavoring No     Other No     Unknown No      Social History     Tobacco Use    Smoking status: Former     Current packs/day: 0.25     Average packs/day: 0.3 packs/day for 35.0 years (8.8 ttl pk-yrs)     Types: Cigarettes    Smokeless tobacco: Never   Vaping Use    Vaping status: Never Used   Substance Use Topics    Alcohol use: Not Currently    Drug use: No       Review of Systems   Constitutional:  Negative for chills and fever.   HENT:  Negative for ear pain, hearing loss, sore throat, tinnitus, trouble swallowing and voice change.    Eyes:  Negative for pain and discharge.   Respiratory:  Negative for cough, shortness of breath and wheezing.    Cardiovascular:  Negative for chest pain and palpitations.   Gastrointestinal:  Negative for abdominal pain, blood in stool, constipation, diarrhea, nausea and vomiting.   Genitourinary:  Positive for bladder incontinence. Negative for dysuria, flank pain, frequency and hematuria.   Musculoskeletal:  Negative for joint swelling, neck pain and neck  stiffness.   Skin:  Negative for rash and wound.   Neurological:  Positive for weakness. Negative for dizziness, vertigo, seizures, syncope, facial asymmetry and headaches.   Psychiatric/Behavioral:  Positive for confusion. Negative for hallucinations, self-injury and suicidal ideas.    All other systems reviewed and are negative.      Physical Exam  Physical Exam  Vitals and nursing note reviewed.   Constitutional:       General: He is not in acute distress.     Appearance: He is well-developed.   HENT:      Head: Normocephalic and atraumatic.      Right Ear: External ear normal.      Left Ear: External ear normal.   Eyes:      General: No scleral icterus.        Right eye: No discharge.         Left eye: No discharge.      Extraocular Movements: Extraocular movements intact.      Conjunctiva/sclera: Conjunctivae normal.   Cardiovascular:      Rate and Rhythm: Normal rate and regular rhythm.      Heart sounds: Normal heart sounds. No murmur heard.  Pulmonary:      Effort: Pulmonary effort is normal.      Breath sounds: Normal breath sounds. No wheezing or rales.   Abdominal:      General: Bowel sounds are normal. There is no distension.      Palpations: Abdomen is soft.      Tenderness: There is no abdominal tenderness. There is no guarding or rebound.   Musculoskeletal:         General: No deformity. Normal range of motion.      Cervical back: Normal range of motion and neck supple.   Skin:     General: Skin is warm and dry.      Findings: No rash.   Neurological:      Mental Status: He is alert.      Comments: Questionable slight facial droop.  Motor strength is 5 out of 5 in all 4.  When asked his age he states he was born in 1935.  When asked what year it is he states 1935.  Is able to state that he is in the hospital.   Psychiatric:         Mood and Affect: Mood normal.         Behavior: Behavior normal.         Thought Content: Thought content normal.         Judgment: Judgment normal.         Vital Signs  ED  Triage Vitals [12/22/23 1600]   Temp Pulse Respirations Blood Pressure SpO2   -- 94 18 129/66 96 %      Temp src Heart Rate Source Patient Position - Orthostatic VS BP Location FiO2 (%)   -- Monitor Lying -- --      Pain Score       --           Vitals:    12/22/23 1600 12/22/23 1615 12/22/23 1630 12/22/23 1645   BP: 129/66 161/74 149/70 145/75   Pulse: 94 71 69 70   Patient Position - Orthostatic VS: Lying Lying Lying Lying         Visual Acuity  Visual Acuity      Flowsheet Row Most Recent Value   L Pupil Size (mm) 2   R Pupil Size (mm) 2            ED Medications  Medications   iohexol (OMNIPAQUE) 350 MG/ML injection (SINGLE-DOSE) 85 mL (85 mL Intravenous Given 12/22/23 1614)   aspirin tablet 325 mg (325 mg Oral Given 12/22/23 1654)       Diagnostic Studies  Results Reviewed       Procedure Component Value Units Date/Time    Blood culture #1 [[id number]] Collected: 12/22/23 1650    Lab Status: In process Specimen: Blood from Arm, Right Updated: 12/22/23 1653    Blood culture #2 [[id number]] Collected: 12/22/23 1650    Lab Status: In process Specimen: Blood from Arm, Left Updated: 12/22/23 1653    HS Troponin I 2hr [[id number]]     Lab Status: No result Specimen: Blood     HS Troponin 0hr (reflex protocol) [[id number]]  (Normal) Collected: 12/22/23 1607    Lab Status: Final result Specimen: Blood from Arm, Right Updated: 12/22/23 1651     hs TnI 0hr 5 ng/L     Basic metabolic panel [[id number]] Collected: 12/22/23 1607    Lab Status: Final result Specimen: Blood from Arm, Right Updated: 12/22/23 1650     Sodium 140 mmol/L      Potassium 3.6 mmol/L      Chloride 104 mmol/L      CO2 30 mmol/L      ANION GAP 6 mmol/L      BUN 18 mg/dL      Creatinine 0.87 mg/dL      Glucose 118 mg/dL      Calcium 9.2 mg/dL      eGFR 77 ml/min/1.73sq m     Narrative:      National Kidney Disease Foundation guidelines for Chronic Kidney Disease (CKD):     Stage 1 with normal or high GFR (GFR > 90 mL/min/1.73 square meters)    Stage 2 Mild  CKD (GFR = 60-89 mL/min/1.73 square meters)    Stage 3A Moderate CKD (GFR = 45-59 mL/min/1.73 square meters)    Stage 3B Moderate CKD (GFR = 30-44 mL/min/1.73 square meters)    Stage 4 Severe CKD (GFR = 15-29 mL/min/1.73 square meters)    Stage 5 End Stage CKD (GFR <15 mL/min/1.73 square meters)  Note: GFR calculation is accurate only with a steady state creatinine    Magnesium [[id number]] Collected: 12/22/23 1607    Lab Status: In process Specimen: Blood from Arm, Right Updated: 12/22/23 1642    Lipase [[id number]] Collected: 12/22/23 1607    Lab Status: In process Specimen: Blood from Arm, Right Updated: 12/22/23 1642    Hepatic function panel [[id number]] Collected: 12/22/23 1607    Lab Status: In process Specimen: Blood from Arm, Right Updated: 12/22/23 1642    Protime-INR [[id number]]  (Normal) Collected: 12/22/23 1607    Lab Status: Final result Specimen: Blood from Arm, Right Updated: 12/22/23 1640     Protime 13.6 seconds      INR 1.01    APTT [[id number]]  (Normal) Collected: 12/22/23 1607    Lab Status: Final result Specimen: Blood from Arm, Right Updated: 12/22/23 1640     PTT 27 seconds     UA w Reflex to Microscopic w Reflex to Culture [[id number]]     Lab Status: No result Specimen: Urine     CBC and Platelet [[id number]]  (Normal) Collected: 12/22/23 1607    Lab Status: Final result Specimen: Blood from Arm, Right Updated: 12/22/23 1626     WBC 6.27 Thousand/uL      RBC 4.34 Million/uL      Hemoglobin 13.2 g/dL      Hematocrit 39.3 %      MCV 91 fL      MCH 30.4 pg      MCHC 33.6 g/dL      RDW 12.6 %      Platelets 199 Thousands/uL      MPV 10.2 fL     FLU/RSV/COVID - if FLU/RSV clinically relevant [[id number]] Collected: 12/22/23 1607    Lab Status: In process Specimen: Nares from Nasopharyngeal Swab Updated: 12/22/23 1623                   XR chest 1 view portable   ED Interpretation by Marc Berman MD (12/22 7765)   Rotated.  No definite infiltrate      CT stroke alert brain   Final Result by Joshua  MALLORY Gallego MD (12/22 1633)      No acute intracranial abnormality.      Findings were directly discussed with Audra Lincoln  at  4:25 PM  .      Workstation performed: [id number]         CTA stroke alert (head/neck)   Final Result by Joshua Gallego MD (12/22 1633)      No hemodynamically significant stenosis, dissection or occlusion of the carotid or vertebral arteries or major vessels of the [place].            Findings were directly discussed with Audra Lincoln  at  4:30 PM  .                           Workstation performed: [id number]                    Procedures  ECG 12 Lead Documentation Only    Date/Time: 12/22/2023 4:27 PM    Performed by: Marc Berman MD  Authorized by: Marc Berman MD    ECG reviewed by me, the ED Provider: yes    Patient location:  ED  Previous ECG:     Previous ECG:  Compared to current    Similarity:  No change  Rate:     ECG rate:  70  Rhythm:     Rhythm: sinus rhythm    Ectopy:     Ectopy: none    QRS:     QRS axis:  Normal           ED Course  ED Course as of 12/22/23 1705   Fri Dec 22, 2023   1620 Patient arrived from CAT scan.  No change from initial exam.   1643 Discussed with supervisor.  There is MRI tomorrow from 8-12.  There are spots open.  Hospitalist aware.                  Stroke Assessment       Row Name 12/22/23 1603             NIH Stroke Scale    Interval Baseline      Level of Consciousness (1a.) 0      LOC Questions (1b.) 1      LOC Commands (1c.) 0      Best Gaze (2.) 0      Visual (3.) 0      Facial Palsy (4.) 1      Motor Arm, Left (5a.) 0      Motor Arm, Right (5b.) 0      Motor Leg, Left (6a.) 0      Motor Leg, Right (6b.) 0      Limb Ataxia (7.) 0      Sensory (8.) 0      Best Language (9.) 0      Dysarthria (10.) 0      Extinction and Inattention (11.) (Formerly Neglect) 0      Total 2                                              Medical Decision Making  Amount and/or Complexity of Data Reviewed  Labs: ordered.  Radiology:  ordered and independent interpretation performed.    Risk  OTC drugs.  Prescription drug management.  Decision regarding hospitalization.             Disposition  Final diagnoses:   Facial droop   Altered mental status, unspecified altered mental status type     Time reflects when diagnosis was documented in both MDM as applicable and the Disposition within this note       Time User Action Codes Description Comment    12/22/2023  4:01 PM Marc Berman [R29.810] Facial droop     12/22/2023  4:39 PM Marc Berman Add [R41.82] Altered mental status, unspecified altered mental status type           ED Disposition       ED Disposition   Admit    Condition   Stable    Date/Time   Fri Dec 22, 2023  4:43 PM    Comment   Case was discussed with Dr. Beyer and the patient's admission status was agreed to be Admission Status: inpatient status to the service of Dr. Beyer.               Follow-up Information    None         Patient's Medications   Discharge Prescriptions    No medications on file       No discharge procedures on file.    PDMP Review       None            ED Provider  Electronically Signed by             Marc Berman MD  12/22/23 8773       Marc Berman MD  12/22/23 1672

## 2023-12-22 NOTE — QUICK NOTE
Stroke alert called: 355 PM  Neurology response : 355 PM via phone  NIHSS 3 for facial droop and confusion based on ED exam,however patient unclear patient's baseline as he is from nursing home and unclear last known well per nursing home    CT H stat not acute  CTA H/N no LVO  Reviewed with radiology also  TNK tPa no - as no clear last known well    At this time concern for acute lacunar infarct, recommend admit under stroke protocol  Permissive HTN SBP > 140-210 /100-120 DBP okay  ASA and statin for secondary stroke prevention  MRI brain routine  Recommend telemetry  Recommend infection other metabolic work up to further evaluate for possible encephalopathy etiology    D/w ED physician at time of alert

## 2023-12-22 NOTE — ED NOTES
Paul txt sent to 3M charge RN. PT to be transported to unit, no s/s of distress, A&Ox4, Id band in place.      Petrona Borrego RN  12/22/23 8258

## 2023-12-22 NOTE — ASSESSMENT & PLAN NOTE
Non pitting edema with erythema to bilateral shins  Will start on ancef for possible cellulitis   Crp pending  MRSA pending

## 2023-12-22 NOTE — ED NOTES
Family at bedside, wanted to make provider aware that pt code status is DNR. This is noted in nursing home paperwork. Kenyon txt sent to hospitalist assigned to pt.      Petrona Borrego RN  12/22/23 0272

## 2023-12-22 NOTE — ASSESSMENT & PLAN NOTE
POA - patient from nursing facility and noted to be more confused than normal with left sided facial droop and lean. No last known well  CT head/ CT head and neck without acute ischemia or stenosis  Neuro consulted -recommending aspirin, statin and stroke work up   Permissive htn  Tele, echo   PT/OT  MRI brain pending

## 2023-12-23 LAB
25(OH)D3 SERPL-MCNC: 33.5 NG/ML (ref 30–100)
ANION GAP SERPL CALCULATED.3IONS-SCNC: 7 MMOL/L
BUN SERPL-MCNC: 14 MG/DL (ref 5–25)
CALCIUM SERPL-MCNC: 8.9 MG/DL (ref 8.4–10.2)
CHLORIDE SERPL-SCNC: 105 MMOL/L (ref 96–108)
CHOLEST SERPL-MCNC: 118 MG/DL
CO2 SERPL-SCNC: 29 MMOL/L (ref 21–32)
CREAT SERPL-MCNC: 0.82 MG/DL (ref 0.6–1.3)
CRP SERPL QL: 2.9 MG/L
ERYTHROCYTE [DISTWIDTH] IN BLOOD BY AUTOMATED COUNT: 12.5 % (ref 11.6–15.1)
EST. AVERAGE GLUCOSE BLD GHB EST-MCNC: 117 MG/DL
FOLATE SERPL-MCNC: 18.3 NG/ML
GFR SERPL CREATININE-BSD FRML MDRD: 78 ML/MIN/1.73SQ M
GLUCOSE SERPL-MCNC: 124 MG/DL (ref 65–140)
GLUCOSE SERPL-MCNC: 93 MG/DL (ref 65–140)
HBA1C MFR BLD: 5.7 %
HCT VFR BLD AUTO: 37.3 % (ref 36.5–49.3)
HDLC SERPL-MCNC: 52 MG/DL
HGB BLD-MCNC: 12.5 G/DL (ref 12–17)
LDLC SERPL CALC-MCNC: 56 MG/DL (ref 0–100)
MAGNESIUM SERPL-MCNC: 2 MG/DL (ref 1.9–2.7)
MCH RBC QN AUTO: 30 PG (ref 26.8–34.3)
MCHC RBC AUTO-ENTMCNC: 33.5 G/DL (ref 31.4–37.4)
MCV RBC AUTO: 89 FL (ref 82–98)
PLATELET # BLD AUTO: 179 THOUSANDS/UL (ref 149–390)
PMV BLD AUTO: 10.2 FL (ref 8.9–12.7)
POTASSIUM SERPL-SCNC: 3.3 MMOL/L (ref 3.5–5.3)
RBC # BLD AUTO: 4.17 MILLION/UL (ref 3.88–5.62)
SODIUM SERPL-SCNC: 141 MMOL/L (ref 135–147)
TRIGL SERPL-MCNC: 51 MG/DL
TSH SERPL DL<=0.05 MIU/L-ACNC: 2.41 UIU/ML (ref 0.45–4.5)
VIT B12 SERPL-MCNC: 150 PG/ML (ref 180–914)
WBC # BLD AUTO: 5.26 THOUSAND/UL (ref 4.31–10.16)

## 2023-12-23 PROCEDURE — 92610 EVALUATE SWALLOWING FUNCTION: CPT

## 2023-12-23 PROCEDURE — 86140 C-REACTIVE PROTEIN: CPT

## 2023-12-23 PROCEDURE — 83735 ASSAY OF MAGNESIUM: CPT

## 2023-12-23 PROCEDURE — 83036 HEMOGLOBIN GLYCOSYLATED A1C: CPT

## 2023-12-23 PROCEDURE — NC001 PR NO CHARGE: Performed by: PSYCHIATRY & NEUROLOGY

## 2023-12-23 PROCEDURE — 82607 VITAMIN B-12: CPT

## 2023-12-23 PROCEDURE — 84443 ASSAY THYROID STIM HORMONE: CPT

## 2023-12-23 PROCEDURE — 80048 BASIC METABOLIC PNL TOTAL CA: CPT

## 2023-12-23 PROCEDURE — 97116 GAIT TRAINING THERAPY: CPT

## 2023-12-23 PROCEDURE — 99232 SBSQ HOSP IP/OBS MODERATE 35: CPT

## 2023-12-23 PROCEDURE — 82306 VITAMIN D 25 HYDROXY: CPT

## 2023-12-23 PROCEDURE — 97163 PT EVAL HIGH COMPLEX 45 MIN: CPT

## 2023-12-23 PROCEDURE — 80061 LIPID PANEL: CPT

## 2023-12-23 PROCEDURE — 97167 OT EVAL HIGH COMPLEX 60 MIN: CPT

## 2023-12-23 PROCEDURE — 85027 COMPLETE CBC AUTOMATED: CPT

## 2023-12-23 PROCEDURE — 82746 ASSAY OF FOLIC ACID SERUM: CPT

## 2023-12-23 PROCEDURE — G0426 INPT/ED TELECONSULT50: HCPCS | Performed by: PSYCHIATRY & NEUROLOGY

## 2023-12-23 PROCEDURE — 97535 SELF CARE MNGMENT TRAINING: CPT

## 2023-12-23 RX ORDER — POTASSIUM CHLORIDE 20 MEQ/1
20 TABLET, EXTENDED RELEASE ORAL ONCE
Status: COMPLETED | OUTPATIENT
Start: 2023-12-23 | End: 2023-12-23

## 2023-12-23 RX ORDER — CLOPIDOGREL BISULFATE 75 MG/1
300 TABLET ORAL ONCE
Status: COMPLETED | OUTPATIENT
Start: 2023-12-23 | End: 2023-12-23

## 2023-12-23 RX ORDER — QUETIAPINE FUMARATE 25 MG/1
25 TABLET, FILM COATED ORAL ONCE
Status: COMPLETED | OUTPATIENT
Start: 2023-12-23 | End: 2023-12-23

## 2023-12-23 RX ORDER — LORAZEPAM 2 MG/ML
0.5 INJECTION INTRAMUSCULAR ONCE
Status: COMPLETED | OUTPATIENT
Start: 2023-12-23 | End: 2023-12-23

## 2023-12-23 RX ORDER — CLOPIDOGREL BISULFATE 75 MG/1
75 TABLET ORAL DAILY
Status: DISCONTINUED | OUTPATIENT
Start: 2023-12-24 | End: 2023-12-26

## 2023-12-23 RX ADMIN — CEFAZOLIN SODIUM 2000 MG: 2 SOLUTION INTRAVENOUS at 11:18

## 2023-12-23 RX ADMIN — DONEPEZIL HYDROCHLORIDE 5 MG: 5 TABLET ORAL at 21:47

## 2023-12-23 RX ADMIN — SERTRALINE HYDROCHLORIDE 50 MG: 50 TABLET ORAL at 09:40

## 2023-12-23 RX ADMIN — ASPIRIN 81 MG CHEWABLE TABLET 81 MG: 81 TABLET CHEWABLE at 09:40

## 2023-12-23 RX ADMIN — POTASSIUM CHLORIDE 20 MEQ: 1500 TABLET, EXTENDED RELEASE ORAL at 09:40

## 2023-12-23 RX ADMIN — FINASTERIDE 5 MG: 5 TABLET, FILM COATED ORAL at 09:40

## 2023-12-23 RX ADMIN — LORAZEPAM 0.5 MG: 2 INJECTION INTRAMUSCULAR; INTRAVENOUS at 23:21

## 2023-12-23 RX ADMIN — QUETIAPINE FUMARATE 25 MG: 25 TABLET ORAL at 22:07

## 2023-12-23 RX ADMIN — HEPARIN SODIUM 5000 UNITS: 5000 INJECTION INTRAVENOUS; SUBCUTANEOUS at 05:25

## 2023-12-23 RX ADMIN — ATORVASTATIN CALCIUM 40 MG: 40 TABLET, FILM COATED ORAL at 17:45

## 2023-12-23 RX ADMIN — CLOPIDOGREL 300 MG: 75 TABLET ORAL at 15:00

## 2023-12-23 RX ADMIN — HEPARIN SODIUM 5000 UNITS: 5000 INJECTION INTRAVENOUS; SUBCUTANEOUS at 21:47

## 2023-12-23 RX ADMIN — HEPARIN SODIUM 5000 UNITS: 5000 INJECTION INTRAVENOUS; SUBCUTANEOUS at 15:00

## 2023-12-23 RX ADMIN — CEFAZOLIN SODIUM 2000 MG: 2 SOLUTION INTRAVENOUS at 03:00

## 2023-12-23 RX ADMIN — CEFAZOLIN SODIUM 2000 MG: 2 SOLUTION INTRAVENOUS at 18:16

## 2023-12-23 RX ADMIN — MELATONIN 3 MG: 3 TAB ORAL at 21:47

## 2023-12-23 RX ADMIN — TAMSULOSIN HYDROCHLORIDE 0.4 MG: 0.4 CAPSULE ORAL at 17:45

## 2023-12-23 NOTE — CONSULTS
Consult received for stroke pathway. Unable to obtain diet hx from pt due to confusion. Pending speech eval. Lipid panel WNL, pending A1c level. Diet instruction not appropriate. Continue regular diet, dysphagia diet per SLP if indicated. Will monitor po, consider supplements at follow up.

## 2023-12-23 NOTE — PLAN OF CARE
Problem: OCCUPATIONAL THERAPY ADULT  Goal: Performs self-care activities at highest level of function for planned discharge setting.  See evaluation for individualized goals.  Description: Treatment Interventions: ADL retraining, Visual perceptual retraining, Functional transfer training, UE strengthening/ROM, Endurance training, Cognitive reorientation, Patient/family training, Equipment evaluation/education, Neuromuscular reeducation, Fine motor coordination activities, Compensatory technique education, UE splinting, Continued evaluation, Cardiac education, Energy conservation, Activityengagement          See flowsheet documentation for full assessment, interventions and recommendations.   Note: Limitation: Decreased ADL status, Decreased Safe judgement during ADL, Decreased cognition, Decreased endurance, Decreased high-level ADLs, Decreased self-care trans  Prognosis: Fair  Assessment: Pt is a 88 y.o. male, admitted to [facility] 12/22/2023 d/t experiencing altered mental status, L-sided facial droop and slurred speech. Dx: stroke-like symptoms. Pt with PMHx impacting their performance during ADL tasks, including: anxiety, benign prostatic hyperplasia, bladder cancer, dementia, depression, enlarged prostate, hyperlipidemia. Pt unable to give PLOF. Cognitive status was PTA was Impaired. OT order placed to assess Pt's ADLs, cognitive status, and performance during functional tasks in order to maximize safety and independence while making most appropriate d/c recommendations. PT/OT co-evaluation completed at this time d/t significant mobility deficits and safety concerns. Pt's clinical presentation is currently unstable/unpredictable given new onset deficits that effect Pt's occupational performance and ability to safely return to PLOF including decrease activity tolerance, decrease standing balance, decrease performance during ADL tasks, decrease cognition, decrease safety awareness , increase impulsiveness, decrease  activity engagement, decrease performance during functional transfers, and high fall risk combined with medical complications of change in mental status, abnormal potassium values, and need for input for mobility technique/safety. Personal factors affecting Pt at time of initial evaluation include: advanced age, inability to perform IADLs, inability to perform ADLs, new need for AD, limited insight into impairments, and decreased initiation and engagement. Pt will benefit from continued skilled OT services to address deficits as defined above and to maximize level independence/participation during ADLs and functional tasks to facilitate return toward PLOF and improved quality of life. From an occupational therapy standpoint, recommendation at time of d/c would be Level II: Moderate Resource Intensity Therapy.     Rehab Resource Intensity Level, OT: II (Moderate Resource Intensity)

## 2023-12-23 NOTE — SPEECH THERAPY NOTE
Speech Language/Pathology  Speech-Language Pathology Bedside Swallow Evaluation      Patient Name: Satya Bailey Sr.    Today's Date: 12/23/2023     Problem List  Principal Problem:    Stroke-like symptoms  Active Problems:    Mild dementia without behavioral disturbance, psychotic disturbance, mood disturbance, or anxiety (HCC)    Metabolic encephalopathy    Bilateral lower extremity edema      Past Medical History  Past Medical History:   Diagnosis Date    Anxiety     Benign prostatic hyperplasia     Bladder cancer (HCC) 2016    Bladder cancer (HCC) 2016    Dementia (HCC)     Depression     Enlarged prostate     Hyperlipidemia        Past Surgical History  Past Surgical History:   Procedure Laterality Date    BLADDER SURGERY  2016    bladder cancer    KNEE SURGERY         Summary   Consult received and appreciated. Pt presented to the hospital 12/22/23 d/t altered mental status, L facial droop, and slurred speech. PMH significant for: dementia and depression. Imaging showing no significant findings. Pt currently on a regular diet with thin liquids. Pt's son present during evaluation. Pt noted upper dentures left at [facility] home and are not in possession to be utilized at this time. Pt demonstrated no s/s of dysarthric speech, followed simple commands, and expressed wants and needs. Confusion noted, however dementia at baseline. SLP provided puree, [facility] soft, and regular trials which he was able to tolerate without difficulty. SLP spoke with pt and son regarding regular textures vs Level 3 (chopped meats) which res and son agreed to initiate Level 3 d/t upper dentures being unavailable. Recommend: Level 3 solids with thin liquids.     Risk/s for Aspiration: Low     Recommended Diet: soft/level 3 diet   Recommended Form of Meds: whole with liquid   Aspiration precautions and swallowing strategies: upright posture, small bites/sips, and alternating bites and sips  Other Recommendations: Continue frequent oral care,  intermittant assistance during eating as pt has difficulty self feeding        Current Medical Status  Satya Bailey Sr. is a 88 y.o. male with a PMH of dementia and bladder cancer who presents with altered mental status.  Patient presents from nursing facility with altered mental status and left-sided facial droop with slurred speech.  Sons at bedside and state patient has left sided lean with mild slurred speech and slow to respond orally.  At baseline is only oriented to self and family.  Family reports worsening cognitive decline in the past 6 months as well as physical decline.  No history of stroke.     Current Precautions:  Fall      Allergies:  No known food allergies    Past medical history:  Please see H&P for details    Special Studies:  MRI BRAIN 12/22/23:  IMPRESSION:  1. No acute infarction, intra hemorrhage or mass effect.  2. Trace, chronic microangiopathy.    CXR 12/22/23  IMPRESSION:  No acute cardiopulmonary disease.    Social/Education/Vocational Hx:  Pt lives SNF    Swallow Information   Current Risks for Dysphagia & Aspiration: Missing upper dentures  Current Symptoms/Concerns: N/A  Current Diet: soft/level 3 diet, thin  Baseline Diet: regular diet with dentures, thin      Baseline Assessment   Behavior/Cognition: alert, confused, dementia at baseline  Speech/Language Status: able to participate in basic conversation  Patient Positioning: upright in chair  Pain Status/Interventions/Response to Interventions:   No report of or nonverbal indications of pain.       Swallow Mechanism Exam  Facial: symmetrical  Labial: WFL  Lingual: WFL  Velum: symmetrical  Mandible: adequate ROM  Dentition: Edentulous upper, missing natural lower  Vocal quality:clear/adequate   Volitional Cough: strong/productive   Respiratory Status: on RA     Consistencies Assessed and Performance   Consistencies Administered: thin liquids, soft solids, and hard solids      Oral Stage: WFL  Mastication was adequate with the  materials administered today.  Bolus formation and transfer were functional with no significant oral residue noted.  No overt s/s reduced oral control. Downgraded to Level 3 d/t edentulous upper and request d/t difficulty chewing meats w/o dentures.    Pharyngeal Stage: Suspected to be WFL  Swallow Mechanics:  Swallowing initiation appeared prompt.  No coughing, throat clearing, change in vocal quality or respiratory status noted today.     Esophageal Concerns: none reported    Summary and Recommendations (see above)    Results Reviewed with: patient, RN, MD, and family     Treatment Recommended: No tx warranted as pt downgraded d/t edentulous upper     Kevin Johnson M.S. CCC-SLP

## 2023-12-23 NOTE — CONSULTS
TeleConsultation - Neurology   Satya Bailey Sr. 88 y.o. male MRN: [id number]  Unit/Bed#: -01 Encounter: [id number]        REQUIRED DOCUMENTATION:     1. This service was provided via Telemedicine.  2. Provider located at [facility].  3. TeleMed provider: Audra Lincoln DO.  4. Identify all parties in room with patient during tele consult:  Patient and son  5.Patient was then informed that this was a Telemedicine visit and that the exam was being conducted confidentially over secure lines. My office door was closed. No one else was in the room.  Patient acknowledged consent and understanding of privacy and security of the Telemedicine visit, and gave us permission to have the assistant stay in the room in order to assist with the history and to conduct the exam.  I informed the patient that I have reviewed their record in Epic and presented the opportunity for them to ask any questions regarding the visit today.  The patient agreed to participate.             Assessment/Plan     Mr. Satya Bailey Sr is a pleasant 87 yo male dementia, HLD, hx bladder CA 2016, seen in consultation for transient dysarthria and facial droop- L yesterday since resolved  - Suspect TIA  - C/w TIA/stroke protocol. MRI brain is not acute.  - Recommend c/w ASA 81 mg - plavix 300 mg load x 1 then 75 mg daily afterward with DUAP x 21 days then can stay on ASA 81 mg indefinitely. C/w lipitor low dose. Lipid pael is nwl. HgbA1C pending  - ECHOcardiogram pending  - Goal normotension  - C/w telemetry  - CTA H/N with no significant abnomality noted  - Therapies to assess patient  - Cannot completely rule out focal seizure given dementia higher risk- shara with family reporting increasing confusional episodes over the last few months - thus will obtain EEG routine. They prefer this IP    Cognitive decline x 1-2 months  - Likely part of his underlying neurodegenerative process  - Infectious work up ongoing negative thus far, being monitored  for cellulitis. Check TSH, B12, if not done recently  - Check routine EEG      Pt can follow up with OP general neurology attending physician within 8 weeks of discharge  History of Present Illness     Reason for Consult / Principal Problem: dysarthria and left facial droop yesterday, increasing confusion x 1-2 months  Hx and PE limited by: tele consult, patient limited historian with dementia- son at bedside provides history  HPI: Satya Bailey Sr. is a 88 y.o.  male, with dementia x many years now per family and at NH for four years,  bladder cancer hx, depression, HLD who presents with slurred speech and left facial droop noted by nursing home yesterday afternoon thus sent to ED.  tPa not given yesterday as unknown LKW.  Per son patient had this symptoms in ED yesterday and even until he left yesterday evening around  530 PM, however patient today this AM with resolved symptoms.  MRI Brain with no acute ischemia and chronic ischemic microangiopathic changes noted- age appropriate.    Son reports patient has been having increasing confusion over the last one to two months with his dementia, examples include getting his children's names mixed up and thinking he is living 15-20 years ago and asking questions relevant to that time period of his life.  No change in environment, illness injury med change reported over the last one to two months.    Patient on my exam is awake alert and non toxic appearing  Work up here thus far with patient afebrile and no infection noted per primary team; he has some LE warmth and being monitored for cellulitis I am told however no erythema noted on exam and patient does not appear to have LE pain during my exam either.  No prior stroke or seizure history  No antiplatelet at baseline PTA per chart review    Inpatient consult to Neurology  Consult performed by: Audra Lincoln DO  Consult ordered by: Lamar Grissom PA-C           Review of Systems 10 point ROS completed and negative  "other than that noted above    Historical Information   Past Medical History:   Diagnosis Date    Anxiety     Benign prostatic hyperplasia     Bladder cancer (HCC) 2016    Bladder cancer (HCC) 2016    Dementia (HCC)     Depression     Enlarged prostate     Hyperlipidemia      Past Surgical History:   Procedure Laterality Date    BLADDER SURGERY  2016    bladder cancer    KNEE SURGERY       Social History   Social History     Substance and Sexual Activity   Alcohol Use Not Currently     Social History     Substance and Sexual Activity   Drug Use No     E-Cigarette/Vaping    E-Cigarette Use Never User      E-Cigarette/Vaping Substances    Nicotine No     THC No     CBD No     Flavoring No     Other No     Unknown No      Social History     Tobacco Use   Smoking Status Former    Current packs/day: 0.25    Average packs/day: 0.3 packs/day for 35.0 years (8.8 ttl pk-yrs)    Types: Cigarettes   Smokeless Tobacco Never     Family History:   Family History   Problem Relation Age of Onset    Diabetes Mother        Review of previous medical records was  completed.     Meds/Allergies   all current active meds have been reviewed    No Known Allergies    Objective   Vitals:Blood pressure 142/70, pulse 80, temperature 97.5 °F (36.4 °C), resp. rate 14, height 5' 10\" (1.778 m), weight 93.7 kg (206 lb 9.1 oz), SpO2 100%.,Body mass index is 31.32 kg/m².    Intake/Output Summary (Last 24 hours) at 12/23/2023 1419  Last data filed at 12/23/2023 1218  Gross per 24 hour   Intake 50 ml   Output 345 ml   Net -295 ml       Invasive Devices:   Invasive Devices       Peripheral Intravenous Line  Duration             Peripheral IV 12/23/23 Right Antecubital <1 day                    Physical Exam  Neurologic Exam    Modified PE as this is a video consultation:  Gen:   NAD.  HEENT:  No Septal deviation EOMI NCAT.  Resp:  Symmetric chest rise and patient in no obvious respiratory distress  MSK: ROM normal  Skin: No rash noted in visualized " "portion of this exam    Neuroexam:  Awake alert oriented to person only, tells me he is at his nursing home \"[facility]\" not oriented to time or situation. Can name simple objects, repetition intact, can follow two and three step commands  CN 2-12 grossly intact - no facial droop noted today- son at bedside also agrees  Motor:  Intact antigravity X 4 extremities.no drift noted  Sensation: Intact to light touch,  Cerebellar: No dysmetria b/l with FNF testing and HS testing  Gait:  deferred (can walk independently- son confirms he did this to the bathroom just earlier today however is recommended to use walker for assistance)    Lab Results: I have personally reviewed pertinent reports.    Imaging Studies: I have personally reviewed pertinent films in PACS  EKG, Pathology, and Other Studies: I have personally reviewed pertinent reports.        Code Status: Level 3 - DNAR and DNI  Advance Directive and Living Will:      Power of :    POLST:      Counseling / Coordination of Care  Total time spent today 60 minutes. Greater than 50% of total time was spent with the patient and / or family counseling and / or coordination of care. A description of the counseling / coordination of care: as noted above in A/P    "

## 2023-12-23 NOTE — CASE MANAGEMENT
Case Management Assessment & Discharge Planning Note    Patient name Satya Bailey .  Location /-01 MRN [id number]  : 1935 Date 2023       Current Admission Date: 2023  Current Admission Diagnosis:Stroke-like symptoms   Patient Active Problem List    Diagnosis Date Noted    Stroke-like symptoms 2023    Metabolic encephalopathy 2023    Bilateral lower extremity edema 2023    Elevated PSA 2023    History of bladder cancer 2023    Recurrent major depressive disorder, in partial remission (HCC) 2019    Mild dementia without behavioral disturbance, psychotic disturbance, mood disturbance, or anxiety (HCC) 2019    Severe obstructive sleep apnea       LOS (days): 1  Geometric Mean LOS (GMLOS) (days): 4.5  Days to GMLOS:3.5     OBJECTIVE:    Risk of Unplanned Readmission Score: 9.46         Current admission status: Inpatient       Preferred Pharmacy:   [facility] - [place] - [address]  [facility] [address]  [facility] [address]  Phone: [phone] Fax: [phone]    Primary Care Provider: Ronny Evans MD    Primary Insurance: MEDICARE  Secondary Insurance: BLUE CROSS    ASSESSMENT:  Active Health Care Proxies    There are no active Health Care Proxies on file.                      Patient Information  Admitted from:: Facility ([facility])  Mental Status: Confused  During Assessment patient was accompanied by: Son  Assessment information provided by:: Son  Primary Caregiver: Other (Comment) ([facility])  Caregiver's Name:: [place]  Caregiver's Relationship to Patient:: Other (Specify) ([facility])  Support Systems: Children, Family members  County of Residence: [facility]  Home entry access options. Select all that apply.: No steps to enter home  Type of Current Residence: Facility ([facility])  Upon entering residence, is there a bedroom on the main floor (no further steps)?: Yes  Upon entering residence, is there a bathroom on the main floor (no  further steps)?: Yes  Living Arrangements: Other (Comment) ([facility])    Activities of Daily Living Prior to Admission  Functional Status: Assistance  Completes ADLs independently?: Yes  Ambulates independently?: Yes  Does patient use assisted devices?: No  Does patient currently own DME?: No  Does patient have a history of Outpatient Therapy (PT/OT)?: No  Does the patient have a history of Short-Term Rehab?: No  Does patient have a history of HHC?: No  Does patient currently have HHC?: No         Patient Information Continued  Income Source: Pension/jail  Does patient have prescription coverage?: Yes  Does patient receive dialysis treatments?: No  Does patient have a history of substance abuse?: No  Does patient have a history of Mental Health Diagnosis?: No         Means of Transportation  Means of Transport to [facility]:: Family transport      Housing Stability: Not on file   Food Insecurity: Not on file   Transportation Needs: Not on file   Utilities: Not on file       DISCHARGE DETAILS:    Discharge planning discussed with:: pt and sons, who are at bedside  [place] of Choice: Yes     CM contacted family/caregiver?: Yes  Were Treatment Team discharge recommendations reviewed with patient/caregiver?: Yes  Did patient/caregiver verbalize understanding of patient care needs?: Yes  Were patient/caregiver advised of the risks associated with not following Treatment Team discharge recommendations?: Yes    Contacts  Patient Contacts: moises schwarz  Relationship to Patient:: Family  Contact Method: In Person  Reason/Outcome: Discharge Planning                   Would you like to participate in our [facility] service program?  : No - Declined     Pt is from [facility].    STR has been recommended for pt at time of discharge, pt and pts family are in agreement with STR.  Family does not have a preference to any STR, CM placing blanket referrals in Aidin    A post acute care recommendation was made by your care team  for STR.  Discussed [place] of Choice with both patient and caregiver. List of facilities given to both patient and caregiver via in person. both patient and caregiver aware the list is custom filtered for them by zip code location and that [facility] post acute providers are designated.

## 2023-12-23 NOTE — PLAN OF CARE
Problem: PAIN - ADULT  Goal: Verbalizes/displays adequate comfort level or baseline comfort level  Description: Interventions:  - Encourage patient to monitor pain and request assistance  - Assess pain using appropriate pain scale  - Administer analgesics based on type and severity of pain and evaluate response  - Implement non-pharmacological measures as appropriate and evaluate response  - Consider cultural and social influences on pain and pain management  - Notify physician/advanced practitioner if interventions unsuccessful or patient reports new pain  Outcome: Progressing     Problem: INFECTION - ADULT  Goal: Absence or prevention of progression during hospitalization  Description: INTERVENTIONS:  - Assess and monitor for signs and symptoms of infection  - Monitor lab/diagnostic results  - Monitor all insertion sites, i.e. indwelling lines, tubes, and drains  - Monitor endotracheal if appropriate and nasal secretions for changes in amount and color  - [place] appropriate cooling/warming therapies per order  - Administer medications as ordered  - Instruct and encourage patient and family to use good hand hygiene technique  - Identify and instruct in appropriate isolation precautions for identified infection/condition  Outcome: Progressing     Problem: SAFETY ADULT  Goal: Patient will remain free of falls  Description: INTERVENTIONS:  - Educate patient/family on patient safety including physical limitations  - Instruct patient to call for assistance with activity   - Consult OT/PT to assist with strengthening/mobility   - Keep Call bell within reach  - Keep bed low and locked with side rails adjusted as appropriate  - Keep care items and personal belongings within reach  - Initiate and maintain comfort rounds  - Make Fall Risk Sign visible to staff  - Offer Toileting every 2 Hours, in advance of need  - Initiate/Maintain bed/chair alarm  - Apply yellow socks and bracelet for high fall risk patients  -  Consider moving patient to room near nurses station  Outcome: Progressing  Goal: Maintain or return to baseline ADL function  Description: INTERVENTIONS:  -  Assess patient's ability to carry out ADLs; assess patient's baseline for ADL function and identify physical deficits which impact ability to perform ADLs (bathing, care of mouth/teeth, toileting, grooming, dressing, etc.)  - Assess/evaluate cause of self-care deficits   - Assess range of motion  - Assess patient's mobility; develop plan if impaired  - Assess patient's need for assistive devices and provide as appropriate  - Encourage maximum independence but intervene and supervise when necessary  - Involve family in performance of ADLs  - Assess for home care needs following discharge   - Consider OT consult to assist with ADL evaluation and planning for discharge  - Provide patient education as appropriate  Outcome: Progressing  Goal: Maintains/Returns to pre admission functional level  Description: INTERVENTIONS:  - Perform AM-PAC 6 Click Basic Mobility/ Daily Activity assessment daily.  - Set and communicate daily mobility goal to care team and patient/family/caregiver.   - Collaborate with rehabilitation services on mobility goals if consulted  - Ambulate patient 4 times a day  - Out of bed for meals 3 times a day  - Out of bed for toileting  - Record patient progress and toleration of activity level   Outcome: Progressing     Problem: DISCHARGE PLANNING  Goal: Discharge to home or other facility with appropriate resources  Description: INTERVENTIONS:  - Identify barriers to discharge w/patient and caregiver  - Arrange for needed discharge resources and transportation as appropriate  - Identify discharge learning needs (meds, wound care, etc.)  - Arrange for interpretive services to assist at discharge as needed  - Refer to Case Management Department for coordinating discharge planning if the patient needs post-hospital services based on  physician/advanced practitioner order or complex needs related to functional status, cognitive ability, or social support system  Outcome: Progressing     Problem: Knowledge Deficit  Goal: Patient/family/caregiver demonstrates understanding of disease process, treatment plan, medications, and discharge instructions  Description: Complete learning assessment and assess knowledge base.  Interventions:  - Provide teaching at level of understanding  - Provide teaching via preferred learning methods  Outcome: Progressing     Problem: Neurological Deficit  Goal: Neurological status is stable or improving  Description: Interventions:  - Monitor and assess patient's level of consciousness, motor function, sensory function, and level of assistance needed for ADLs.   - Monitor and report changes from baseline. Collaborate with interdisciplinary team to initiate plan and implement interventions as ordered.   - Provide and maintain a safe environment.  - Consider seizure precautions.  - Consider fall precautions.  - Consider aspiration precautions.  - Consider bleeding precautions.  Outcome: Progressing     Problem: Activity Intolerance/Impaired Mobility  Goal: Mobility/activity is maintained at optimum level for patient  Description: Interventions:  - Assess and monitor patient  barriers to mobility and need for assistive/adaptive devices.  - Assess patient's emotional response to limitations.  - Collaborate with interdisciplinary team and initiate plans and interventions as ordered.  - Encourage independent activity per ability.  - Maintain proper body alignment.  - Perform active/passive rom as tolerated/ordered.  - Plan activities to conserve energy.  - Turn patient as appropriate  Outcome: Progressing     Problem: Communication Impairment  Goal: Ability to express needs and understand communication  Description: Assess patient's communication skills and ability to understand information.  Patient will demonstrate use of  effective communication techniques, alternative methods of communication and understanding even if not able to speak.     - Encourage communication and provide alternate methods of communication as needed.  - Collaborate with case management/ for discharge needs.  - Include patient/family/caregiver in decisions related to communication.  Outcome: Progressing     Problem: Potential for Aspiration  Goal: Non-ventilated patient's risk of aspiration is minimized  Description: Assess and monitor vital signs, respiratory status, and labs (WBC).  Monitor for signs of aspiration (tachypnea, cough, rales, wheezing, cyanosis, fever).    - Assess and monitor patient's ability to swallow.  - Place patient up in chair to eat if possible.  - HOB up at 90 degrees to eat if unable to get patient up into chair.  - Supervise patient during oral intake.   - Instruct patient/ family to take small bites.  - Instruct patient/ family to take small single sips when taking liquids.  - Follow patient-specific strategies generated by speech pathologist.  Outcome: Progressing     Problem: Nutrition  Goal: Nutrition/Hydration status is improving  Description: Monitor and assess patient's nutrition/hydration status for malnutrition (ex- brittle hair, bruises, dry skin, pale skin and conjunctiva, muscle wasting, smooth red tongue, and disorientation). Collaborate with interdisciplinary team and initiate plan and interventions as ordered.  Monitor patient's weight and dietary intake as ordered or per policy. Utilize nutrition screening tool and intervene per policy. Determine patient's food preferences and provide high-protein, high-caloric foods as appropriate.     - Assist patient with eating.  - Allow adequate time for meals.  - Encourage patient to take dietary supplement as ordered.  - Collaborate with clinical nutritionist.  - Include patient/family/caregiver in decisions related to nutrition.  Outcome: Progressing      Problem: Nutrition/Hydration-ADULT  Goal: Nutrient/Hydration intake appropriate for improving, restoring or maintaining nutritional needs  Description: Monitor and assess patient's nutrition/hydration status for malnutrition. Collaborate with interdisciplinary team and initiate plan and interventions as ordered.  Monitor patient's weight and dietary intake as ordered or per policy. Utilize nutrition screening tool and intervene as necessary. Determine patient's food preferences and provide high-protein, high-caloric foods as appropriate.     INTERVENTIONS:  - Monitor oral intake, urinary output, labs, and treatment plans  - Assess nutrition and hydration status and recommend course of action  - Evaluate amount of meals eaten  - Assist patient with eating if necessary   - Allow adequate time for meals  - Recommend/ encourage appropriate diets, oral nutritional supplements, and vitamin/mineral supplements  - Order, calculate, and assess calorie counts as needed  - Recommend, monitor, and adjust tube feedings and TPN/PPN based on assessed needs  - Assess need for intravenous fluids  - Provide specific nutrition/hydration education as appropriate  - Include patient/family/caregiver in decisions related to nutrition  Outcome: Progressing

## 2023-12-23 NOTE — OCCUPATIONAL THERAPY NOTE
"    Occupational Therapy Evaluation     Patient Name: Satya Bailey Sr.  Today's Date: 12/23/2023  Problem List  Principal Problem:    Stroke-like symptoms  Active Problems:    Mild dementia without behavioral disturbance, psychotic disturbance, mood disturbance, or anxiety (HCC)    Metabolic encephalopathy    Bilateral lower extremity edema    Past Medical History  Past Medical History:   Diagnosis Date    Anxiety     Benign prostatic hyperplasia     Bladder cancer (HCC) 2016    Bladder cancer (HCC) 2016    Dementia (HCC)     Depression     Enlarged prostate     Hyperlipidemia      Past Surgical History  Past Surgical History:   Procedure Laterality Date    BLADDER SURGERY  2016    bladder cancer    KNEE SURGERY          12/23/23 0945   Note Type   Note type Evaluation   Pain Assessment   Pain Assessment Tool 0-10   Pain Score No Pain   Restrictions/Precautions   Other Precautions Cognitive;Chair Alarm;Bed Alarm;Fall Risk;Telemetry   Home Living   Type of Home Assisted living   Home Layout One level;Performs ADLs on one level;Able to live on main level with bedroom/bathroom   Additional Comments Pt questionable historian d/t cognitive deficits. Per EMR, pt resides at [facility]. Pt reports not using AD PTA.   Prior Function   Level of [place] Other (Comment)  (Pt unable to give PLOF)   Lives With Facility staff   IADLs Family/Friend/Other provides transportation;Family/Friend/Other provides meals;Family/Friend/Other provides medication management   Subjective   Subjective \"I want to watch the cars go by\"   ADL   UB Dressing Assistance 4  Minimal Assistance   UB Dressing Deficit Setup;Increased time to complete   LB Dressing Assistance 3  Moderate Assistance   LB Dressing Deficit Setup;Requires assistive device for steadying;Verbal cueing;Increased time to complete   Additional Comments UB ADLs @ Min A for task initiaiton d/t increased distractibility. LB ADLs @ Mod A. Pt able to doff socks " while seated in chair, required assist to don. See tx assessment for greater details regarding ADL performance.   Bed Mobility   Additional Comments Pt seated at EOB at beginning of session and OOB in chair at end of session. Bed mobility not assessed at this time.   Transfers   Sit to Stand   (Steadying assist)   Additional items Assist x 1;Increased time required;Verbal cues   Stand to Sit   (Steadying assist)   Additional items Assist x 1;Increased time required;Verbal cues   Stand pivot 4  Minimal assistance   Additional items Assist x 1;Increased time required;Verbal cues   Additional Comments STS from EOB with no AD @ Steadying assist. Pt able to complete functional mobility around room to bathroom with HHA @ Min A x1. See tx assessment for greater details regarding functional mobility.   Balance   Static Sitting Good   Dynamic Sitting Fair +   Static Standing Fair -   Dynamic Standing Poor +   Activity Tolerance   Activity Tolerance Other (Comment)  (Treatment limited secondary to cognitive deficits)   Medical Staff Made Aware Spoke with PT Rupal   Nurse Made Aware Spoke with RN April MAR Assessment   RUE Assessment WFL   LUE Assessment   LUE Assessment WFL   Hand Function   Gross Motor Coordination Functional   Fine Motor Coordination Functional   Cognition   Overall Cognitive Status Impaired   Arousal/Participation Alert;Responsive;Cooperative   Attention Difficulty dividing attention   Orientation Level Oriented to person   Memory Decreased long term memory;Decreased short term memory;Decreased recall of biographical information;Decreased recall of precautions;Decreased recall of recent events   Following Commands Follows one step commands inconsistently   Comments Per EMR, pt A&O to self and family only at baseline. Pt highly distractible throughout with max verbal cueing for attention and redirection to task.   Assessment   Limitation Decreased ADL status;Decreased Safe judgement during ADL;Decreased  cognition;Decreased endurance;Decreased high-level ADLs;Decreased self-care trans   Prognosis Fair   Assessment Pt is a 88 y.o. male, admitted to [facility] 12/22/2023 d/t experiencing altered mental status, L-sided facial droop and slurred speech. Dx: stroke-like symptoms. Pt with PMHx impacting their performance during ADL tasks, including: anxiety, benign prostatic hyperplasia, bladder cancer, dementia, depression, enlarged prostate, hyperlipidemia. Pt unable to give PLOF. Cognitive status was PTA was Impaired. OT order placed to assess Pt's ADLs, cognitive status, and performance during functional tasks in order to maximize safety and independence while making most appropriate d/c recommendations. PT/OT co-evaluation completed at this time d/t significant mobility deficits and safety concerns. Pt's clinical presentation is currently unstable/unpredictable given new onset deficits that effect Pt's occupational performance and ability to safely return to PLOF including decrease activity tolerance, decrease standing balance, decrease performance during ADL tasks, decrease cognition, decrease safety awareness , increase impulsiveness, decrease activity engagement, decrease performance during functional transfers, and high fall risk combined with medical complications of change in mental status, abnormal potassium values, and need for input for mobility technique/safety. Personal factors affecting Pt at time of initial evaluation include: advanced age, inability to perform IADLs, inability to perform ADLs, new need for AD, limited insight into impairments, and decreased initiation and engagement. Pt will benefit from continued skilled OT services to address deficits as defined above and to maximize level independence/participation during ADLs and functional tasks to facilitate return toward PLOF and improved quality of life. From an occupational therapy standpoint, recommendation at time of d/c would be Level II: Moderate  Resource Intensity Therapy.   Plan   Treatment Interventions ADL retraining;Visual perceptual retraining;Functional transfer training;UE strengthening/ROM;Endurance training;Cognitive reorientation;Patient/family training;Equipment evaluation/education;Neuromuscular reeducation;Fine motor coordination activities;Compensatory technique education;UE splinting;Continued evaluation;Cardiac education;Energy conservation;Activityengagement   Goal Expiration Date 01/06/24   OT Treatment Day 1   OT Frequency 2-3x/wk   Discharge Recommendation   Rehab Resource Intensity Level, OT II (Moderate Resource Intensity)   AM-PAC Daily Activity Inpatient   Lower Body Dressing 2   Bathing 2   Toileting 3   Upper Body Dressing 3   Grooming 3   Eating 3   Daily Activity Raw Score 16   Daily Activity Standardized Score (Calc for Raw Score >=11) 35.96   AM-PAC Applied Cognition Inpatient   Following a Speech/Presentation 1   Understanding Ordinary Conversation 2   Taking Medications 1   Remembering Where Things Are Placed or Put Away 1   Remembering List of 4-5 Errands 1   Taking Care of Complicated Tasks 1   Applied Cognition Raw Score 7   Applied Cognition Standardized Score 15.17   Additional Treatment Session   Start Time 1000   End Time 1010   Treatment Assessment Pt completed OT tx session #1 focused on ADL performance and functional mobility. Pt seated on BSC over toilet at beginning of session. Pt required assist to thread briefs while seated on toilet. Pt attempting to undress while on toilet, able to don hospital gown with Min A and encouragement. Toilet transfer @ Steadying assist with cues for hand placement. Pt able to complete frontal pericare while standing with Steadying assist, assist for thoroughness for posterior pericare. Required assist to manage briefs while standing d/t fixation on telemetry lines. Pt given RW to increase safety with mobility. Pt able to complete functional mobility around room to chair with RW @  Steadying assist-Min A for RW management. Pt seated OOB in chair at end of session with chair alarm intact, call bell within reach and all needs met.     The patient's raw score on the -PAC Daily Activity Inpatient Short Form is 16. A raw score of less than 19 suggests the patient may benefit from discharge to post-acute rehabilitation services. Please refer to the recommendation of the Occupational Therapist for safe discharge planning.    Pt goals to be met by 1/6/2024    Pt will demonstrate ability to complete grooming/hygiene tasks @ S after set-up.  Pt will demonstrate ability to complete supine<>sit @ S in order to increase safety and independence during ADL tasks.  Pt will demonstrate ability to complete UB ADLs including washing/dressing @ S in order to increase performance and participation during meaningful tasks  Pt will demonstrate ability to complete LB dressing @ S in order to increase safety and independence during meaningful tasks.   Pt will demonstrate ability to eliza/doff socks/shoes while sitting EOB @ S in order to increase safety and independence during meaningful tasks.   Pt will demonstrate ability to complete toileting tasks including CM and pericare @ S in order to increase safety and independence during meaningful tasks.  Pt will demonstrate ability to complete EOB, chair, toilet/commode transfers @ S in order to increase performance and participation during functional tasks.  Pt will demonstrate ability to stand for 3-5 minutes while maintaining Fair + balance with use of LRAD for UB support PRN.  Pt will attend to continued cognitive assessments 100% of the time in order to provide most appropriate d/c recommendations.   Pt will identify 3 areas of interest/hobbies and 1 intervention on how to incorporate into daily life in order to increase interaction with environment and peers as well as increase participation in meaningful tasks.     Elissa Helms, OTR/L

## 2023-12-23 NOTE — ASSESSMENT & PLAN NOTE
POA - patient from nursing facility and noted to be more confused than normal with left sided facial droop and lean. No last known well  CT head/ CT head and neck without acute ischemia or stenosis  Permissive htn  Tele, echo   TSH, b12, folate, vitamin d pending  A1c pending  Lipid panel WNL  PT/OT  MRI brain: No acute infarction, intra hemorrhage or mass effect. trace, chronic microangiopathy   Neuro consulted -recommending aspirin, statin and stroke work up; suspect possibly TIA, load with plavix 300 mg and then do plavix 75 x 21 days tomorrow. Continue ASA. Follow up with echo and also eeg.   Symptoms improved today per patient's son with left sided facial droop, but still with left sided leaning.

## 2023-12-23 NOTE — PLAN OF CARE
Problem: PAIN - ADULT  Goal: Verbalizes/displays adequate comfort level or baseline comfort level  Description: Interventions:  - Encourage patient to monitor pain and request assistance  - Assess pain using appropriate pain scale  - Administer analgesics based on type and severity of pain and evaluate response  - Implement non-pharmacological measures as appropriate and evaluate response  - Consider cultural and social influences on pain and pain management  - Notify physician/advanced practitioner if interventions unsuccessful or patient reports new pain  Outcome: Progressing     Problem: INFECTION - ADULT  Goal: Absence or prevention of progression during hospitalization  Description: INTERVENTIONS:  - Assess and monitor for signs and symptoms of infection  - Monitor lab/diagnostic results  - Monitor all insertion sites, i.e. indwelling lines, tubes, and drains  - Monitor endotracheal if appropriate and nasal secretions for changes in amount and color  - [place] appropriate cooling/warming therapies per order  - Administer medications as ordered  - Instruct and encourage patient and family to use good hand hygiene technique  - Identify and instruct in appropriate isolation precautions for identified infection/condition  Outcome: Progressing     Problem: SAFETY ADULT  Goal: Patient will remain free of falls  Description: INTERVENTIONS:  - Educate patient/family on patient safety including physical limitations  - Instruct patient to call for assistance with activity   - Consult OT/PT to assist with strengthening/mobility   - Keep Call bell within reach  - Keep bed low and locked with side rails adjusted as appropriate  - Keep care items and personal belongings within reach  - Initiate and maintain comfort rounds  - Make Fall Risk Sign visible to staff  - Offer Toileting every  Hours, in advance of need  - Initiate/Maintain alarm  - Obtain necessary fall risk management equipment:   - Apply yellow socks and  bracelet for high fall risk patients  - Consider moving patient to room near nurses station  Outcome: Progressing  Goal: Maintain or return to baseline ADL function  Description: INTERVENTIONS:  -  Assess patient's ability to carry out ADLs; assess patient's baseline for ADL function and identify physical deficits which impact ability to perform ADLs (bathing, care of mouth/teeth, toileting, grooming, dressing, etc.)  - Assess/evaluate cause of self-care deficits   - Assess range of motion  - Assess patient's mobility; develop plan if impaired  - Assess patient's need for assistive devices and provide as appropriate  - Encourage maximum independence but intervene and supervise when necessary  - Involve family in performance of ADLs  - Assess for home care needs following discharge   - Consider OT consult to assist with ADL evaluation and planning for discharge  - Provide patient education as appropriate  Outcome: Progressing  Goal: Maintains/Returns to pre admission functional level  Description: INTERVENTIONS:  - Perform AM-PAC 6 Click Basic Mobility/ Daily Activity assessment daily.  - Set and communicate daily mobility goal to care team and patient/family/caregiver.   - Collaborate with rehabilitation services on mobility goals if consulted  - Perform Range of Motion  times a day.  - Reposition patient every  hours.  - Dangle patient  times a day  - Stand patient  times a day  - Ambulate patient  times a day  - Out of bed to chair  times a day   - Out of bed for meals times a day  - Out of bed for toileting  - Record patient progress and toleration of activity level   Outcome: Progressing     Problem: DISCHARGE PLANNING  Goal: Discharge to home or other facility with appropriate resources  Description: INTERVENTIONS:  - Identify barriers to discharge w/patient and caregiver  - Arrange for needed discharge resources and transportation as appropriate  - Identify discharge learning needs (meds, wound care, etc.)  -  Arrange for interpretive services to assist at discharge as needed  - Refer to Case Management Department for coordinating discharge planning if the patient needs post-hospital services based on physician/advanced practitioner order or complex needs related to functional status, cognitive ability, or social support system  Outcome: Progressing     Problem: Knowledge Deficit  Goal: Patient/family/caregiver demonstrates understanding of disease process, treatment plan, medications, and discharge instructions  Description: Complete learning assessment and assess knowledge base.  Interventions:  - Provide teaching at level of understanding  - Provide teaching via preferred learning methods  Outcome: Progressing     Problem: Neurological Deficit  Goal: Neurological status is stable or improving  Description: Interventions:  - Monitor and assess patient's level of consciousness, motor function, sensory function, and level of assistance needed for ADLs.   - Monitor and report changes from baseline. Collaborate with interdisciplinary team to initiate plan and implement interventions as ordered.   - Provide and maintain a safe environment.  - Consider seizure precautions.  - Consider fall precautions.  - Consider aspiration precautions.  - Consider bleeding precautions.  Outcome: Progressing     Problem: Activity Intolerance/Impaired Mobility  Goal: Mobility/activity is maintained at optimum level for patient  Description: Interventions:  - Assess and monitor patient  barriers to mobility and need for assistive/adaptive devices.  - Assess patient's emotional response to limitations.  - Collaborate with interdisciplinary team and initiate plans and interventions as ordered.  - Encourage independent activity per ability.  - Maintain proper body alignment.  - Perform active/passive rom as tolerated/ordered.  - Plan activities to conserve energy.  - Turn patient as appropriate  Outcome: Progressing     Problem: Communication  Impairment  Goal: Ability to express needs and understand communication  Description: Assess patient's communication skills and ability to understand information.  Patient will demonstrate use of effective communication techniques, alternative methods of communication and understanding even if not able to speak.     - Encourage communication and provide alternate methods of communication as needed.  - Collaborate with case management/ for discharge needs.  - Include patient/family/caregiver in decisions related to communication.  Outcome: Progressing     Problem: Potential for Aspiration  Goal: Non-ventilated patient's risk of aspiration is minimized  Description: Assess and monitor vital signs, respiratory status, and labs (WBC).  Monitor for signs of aspiration (tachypnea, cough, rales, wheezing, cyanosis, fever).    - Assess and monitor patient's ability to swallow.  - Place patient up in chair to eat if possible.  - HOB up at 90 degrees to eat if unable to get patient up into chair.  - Supervise patient during oral intake.   - Instruct patient/ family to take small bites.  - Instruct patient/ family to take small single sips when taking liquids.  - Follow patient-specific strategies generated by speech pathologist.  Outcome: Progressing     Problem: Nutrition  Goal: Nutrition/Hydration status is improving  Description: Monitor and assess patient's nutrition/hydration status for malnutrition (ex- brittle hair, bruises, dry skin, pale skin and conjunctiva, muscle wasting, smooth red tongue, and disorientation). Collaborate with interdisciplinary team and initiate plan and interventions as ordered.  Monitor patient's weight and dietary intake as ordered or per policy. Utilize nutrition screening tool and intervene per policy. Determine patient's food preferences and provide high-protein, high-caloric foods as appropriate.     - Assist patient with eating.  - Allow adequate time for meals.  -  Encourage patient to take dietary supplement as ordered.  - Collaborate with clinical nutritionist.  - Include patient/family/caregiver in decisions related to nutrition.  Outcome: Progressing

## 2023-12-23 NOTE — PLAN OF CARE
Problem: PHYSICAL THERAPY ADULT  Goal: Performs mobility at highest level of function for planned discharge setting.  See evaluation for individualized goals.  Description: Treatment/Interventions: ADL retraining, Functional transfer training, LE strengthening/ROM, Elevations, Therapeutic exercise, Endurance training, Patient/family training, Equipment eval/education, Bed mobility, Gait training, Compensatory technique education, Spoke to nursing, Spoke to case management, OT, Cognitive reorientation  Equipment Recommended:  (anticipate need for walker)       See flowsheet documentation for full assessment, interventions and recommendations.  12/23/2023 1102 by Rupal Lopez, PT  Note: Prognosis: Good  Problem List: Decreased strength, Decreased endurance, Impaired balance, Decreased mobility, Decreased coordination, Impaired judgement, Decreased cognition, Decreased safety awareness, Obesity  Pt tolerated session fairly well. he was able to ambulate increased distance with RW. He continues to require assistance for safe navigation wiht RW management. He requires cues for increased attention to task with frequent redirection. He is limtie dby decreased strenght, balance and endurance and safety concerns with impaired cognition. He will continue to beenfit from PT services to [facility].  Barriers to Discharge: Decreased caregiver support, Inaccessible home environment  Barriers to Discharge Comments: requires increased assistance to complete mobility, increased fall risk  Rehab Resource Intensity Level, PT: II (Moderate Resource Intensity)    See flowsheet documentation for full assessment.

## 2023-12-23 NOTE — ASSESSMENT & PLAN NOTE
UA and infectious workup pending at this time   Blood cultures pending   No overt signs of infection, concern for lower extremity cellulitis and on IV ancef.   B12, folate, vitamin d pending   Rest of plan under stroke like symptoms

## 2023-12-23 NOTE — DISCHARGE INSTR - DIET
Level 3, mechanical soft - chopped meats   Thin liquids  Intermittent assistance during meals as pt has difficulty self-feeding  Meds as tolerated

## 2023-12-23 NOTE — PROGRESS NOTES
[facility]  Progress Note  Name: Satya Hadley I  MRN: [id number]  Unit/Bed#: -[facility] I Date of Admission: 12/22/2023   Date of Service: 12/23/2023 I Hospital Day: 1    Assessment/Plan   * Stroke-like symptoms  Assessment & Plan  POA - patient from nursing facility and noted to be more confused than normal with left sided facial droop and lean. No last known well  CT head/ CT head and neck without acute ischemia or stenosis  Permissive htn  Tele, echo   TSH, b12, folate, vitamin d pending  A1c pending  Lipid panel WNL  PT/OT  MRI brain: No acute infarction, intra hemorrhage or mass effect. trace, chronic microangiopathy   Neuro consulted -recommending aspirin, statin and stroke work up; suspect possibly TIA, load with plavix 300 mg and then do plavix 75 x 21 days tomorrow. Continue ASA. Follow up with echo and also eeg.   Symptoms improved today per patient's son with left sided facial droop, but still with left sided leaning.     Metabolic encephalopathy  Assessment & Plan  UA and infectious workup pending at this time   Blood cultures pending   No overt signs of infection, concern for lower extremity cellulitis and on IV ancef.   B12, folate, vitamin d pending   Rest of plan under stroke like symptoms    Bilateral lower extremity edema  Assessment & Plan  Non pitting edema with erythema to bilateral shins  Will start on ancef for possible cellulitis   Crp pending  MRSA pending    Mild dementia without behavioral disturbance, psychotic disturbance, mood disturbance, or anxiety (HCC)  Assessment & Plan  Continue aricept  Currently at baseline-oriented to self and family.  Intermittently oriented to place         VTE Pharmacologic Prophylaxis: VTE Score: 4 Moderate Risk (Score 3-4) - Pharmacological DVT Prophylaxis Ordered: heparin.    Mobility:   Basic Mobility Inpatient Raw Score: 17  JH-HLM Goal: 5: Stand one or more mins  JH-HLM Achieved: 7: Walk 25 feet or more  HLM Goal achieved.  "Continue to encourage appropriate mobility.    Patient Centered Rounds: I performed bedside rounds with nursing staff today.   Discussions with Specialists or Other Care Team Provider: nursing, case management, neurology    Education and Discussions with Family / Patient: Updated  (son) at bedside.    Total Time Spent on Date of Encounter in care of patient: 45 mins. This time was spent on one or more of the following: performing physical exam; counseling and coordination of care; obtaining or reviewing history; documenting in the medical record; reviewing/ordering tests, medications or procedures; communicating with other healthcare professionals and discussing with patient's family/caregivers.    Current Length of Stay: 1 day(s)  Current Patient Status: Inpatient   Certification Statement: The patient will continue to require additional inpatient hospital stay due to stroke workup, metabolic encephalopathy, EEG, echo  Discharge Plan: Anticipate discharge in 48-72 hrs to rehab facility.    Code Status: Level 3 - DNAR and DNI    Subjective:   Patient seen and examined at bedside this morning with son present.  Patient currently offering no complaints.  Initially thought that he was in [place] corrected himself to [place].  States that he \"has been bad this year and is likely on the naughty list \"when discussing [place].  Offers no complaints no nausea, vomiting, diarrhea, constipation, fever, chills, chest pain, shortness of breath.  Left-sided facial droop improved per son however still with left-sided lean.    Objective:     Vitals:   Temp (24hrs), Av.7 °F (36.5 °C), Min:97.5 °F (36.4 °C), Max:97.9 °F (36.6 °C)    Temp:  [97.5 °F (36.4 °C)-97.9 °F (36.6 °C)] 97.5 °F (36.4 °C)  HR:  [62-94] 80  Resp:  [14-29] 14  BP: (113-170)/(48-77) 142/70  SpO2:  [94 %-100 %] 100 %  Body mass index is 31.32 kg/m².     Input and Output Summary (last 24 hours):     Intake/Output Summary (Last 24 hours) at " 12/23/2023 1443  Last data filed at 12/23/2023 1218  Gross per 24 hour   Intake 50 ml   Output 345 ml   Net -295 ml       Physical Exam:   Physical Exam  Vitals reviewed.   Constitutional:       General: He is not in acute distress.     Appearance: He is obese. He is ill-appearing. He is not toxic-appearing.   HENT:      Head: Normocephalic and atraumatic.      Mouth/Throat:      Mouth: Mucous membranes are moist.   Cardiovascular:      Rate and Rhythm: Normal rate and regular rhythm.      Heart sounds: No murmur heard.  Pulmonary:      Effort: No respiratory distress.      Breath sounds: No stridor. No wheezing.      Comments: Saturating well on room air  Abdominal:      General: Bowel sounds are normal. There is no distension.      Palpations: Abdomen is soft. There is no mass.      Tenderness: There is no abdominal tenderness.   Musculoskeletal:      Right lower leg: Edema present.      Left lower leg: Edema present.      Comments: With nonpitting bilateral lower extremity edema.   Skin:     General: Skin is warm and dry.      Findings: Erythema (Mild erythema to bilateral shins) present.   Neurological:      Mental Status: He is alert.      Cranial Nerves: No cranial nerve deficit, dysarthria or facial asymmetry.      Sensory: No sensory deficit.      Motor: Tremor present.      Comments: Oriented to self and partially place, not oriented to time  Still with left-sided lean however no left-sided facial droop appreciated.   Psychiatric:         Mood and Affect: Mood normal.         Behavior: Behavior normal.          Additional Data:     Labs:  Results from last 7 days   Lab Units 12/23/23  0539   WBC Thousand/uL 5.26   HEMOGLOBIN g/dL 12.5   HEMATOCRIT % 37.3   PLATELETS Thousands/uL 179     Results from last 7 days   Lab Units 12/23/23  0539 12/22/23  1607   SODIUM mmol/L 141 140   POTASSIUM mmol/L 3.3* 3.6   CHLORIDE mmol/L 105 104   CO2 mmol/L 29 30   BUN mg/dL 14 18   CREATININE mg/dL 0.82 0.87   ANION GAP  mmol/L 7 6   CALCIUM mg/dL 8.9 9.2   ALBUMIN g/dL  --  4.1   TOTAL BILIRUBIN mg/dL  --  0.91   ALK PHOS U/L  --  48   ALT U/L  --  18   AST U/L  --  40*   GLUCOSE RANDOM mg/dL 93 118     Results from last 7 days   Lab Units 12/22/23  1607   INR  1.01     Results from last 7 days   Lab Units 12/22/23  1556   POC GLUCOSE mg/dl 124               Lines/Drains:  Invasive Devices       Peripheral Intravenous Line  Duration             Peripheral IV 12/23/23 Right Antecubital <1 day                      Telemetry:  Telemetry Orders (From admission, onward)               24 Hour Telemetry Monitoring  Continuous x 24 Hours (Telem)        Question:  Reason for 24 Hour Telemetry  Answer:  TIA/Suspected CVA/ Confirmed CVA                     Telemetry Reviewed: Normal Sinus Rhythm  Indication for Continued Telemetry Use: Acute CVA             Imaging: Reviewed radiology reports from this admission including: MRI brain, XR chest, CTA head/neck, CT brain    Recent Cultures (last 7 days):   Results from last 7 days   Lab Units 12/22/23  1650   BLOOD CULTURE  Received in Microbiology Lab. Culture in Progress.  Received in Microbiology Lab. Culture in Progress.       Last 24 Hours Medication List:   Current Facility-Administered Medications   Medication Dose Route Frequency Provider Last Rate    aspirin  81 mg Oral Daily Lamar Grissom PA-C      atorvastatin  40 mg Oral QPM Lamar Grissom PA-C      cefazolin  2,000 mg Intravenous Q8H HOSSEIN Robb-C 2,000 mg (12/23/23 1118)    clopidogrel  300 mg Oral Once Serene Cisneros PA-C      [START ON 12/24/2023] clopidogrel  75 mg Oral Daily Serene Cisneros PA-C      donepezil  5 mg Oral HS Lamar IVON Grissom      finasteride  5 mg Oral Daily Lamar IVON Grissom      heparin (porcine)  5,000 Units Subcutaneous Q8H BOBBY Lamar Grissom PA-C      melatonin  3 mg Oral HS Lamar IVON Grissom      sertraline  50 mg Oral Daily Lamar IVON Grissom      tamsulosin  0.4 mg Oral Daily With Dinner  Lamar Grissom PA-C          Today, Patient Was Seen By: Serene Cisneros PA-C    **Please Note: This note may have been constructed using a voice recognition system.**

## 2023-12-23 NOTE — PLAN OF CARE
Problem: PHYSICAL THERAPY ADULT  Goal: Performs mobility at highest level of function for planned discharge setting.  See evaluation for individualized goals.  Description: Treatment/Interventions: ADL retraining, Functional transfer training, LE strengthening/ROM, Elevations, Therapeutic exercise, Endurance training, Patient/family training, Equipment eval/education, Bed mobility, Gait training, Compensatory technique education, Spoke to nursing, Spoke to case management, OT, Cognitive reorientation  Equipment Recommended:  (anticipate need for walker)       See flowsheet documentation for full assessment, interventions and recommendations.  Note: Prognosis: Good  Problem List: Decreased strength, Decreased endurance, Impaired balance, Decreased mobility, Decreased coordination, Impaired judgement, Decreased cognition, Decreased safety awareness, Obesity  Assessment: Pt is a 88 y.o. male seen for PT evaluation s/p admission to [facility]23 with Stroke-like symptoms.  Order placed for PT services.  Upon evaluation: Pt is presenting with impaired functional mobility due to decreased strength, decreased endurance, impaired balance, impaired coordination, gait deviations, impaired cognition, decreased safety awareness, impaired judgment, impaired hearing, fall risk, and impaired skin integrity requiring  steadying to minimal assistance for transfers and minimal assistance for ambulation with out AD . Pt's clinical presentation is currently unpredictable given the functional mobility deficits above, especially weakness, decreased endurance, impaired balance, gait deviations, decreased activity tolerance, decreased functional mobility tolerance, decreased safety awareness, impaired judgement, and decreased cognition, coupled with fall risks as indicated by AM-PAC 6-Clicks: 17/24 as well as impulsivity, impaired balance, polypharmacy, impaired coordination, impaired judgement, decreased  safety awareness, and decreased cognition and combined with medical complications of abnormal potassium values, impulsivity during admission, and need for input for mobility technique/safety, metabolic encephalopathy, LE edema.  Pt's PMHx and comorbidities that may affect physical performance and progress include: anxiety, Dementia, depression, and BPH, h/o bladder CA . Personal factors affecting pt at time of IE include: inaccessible home environment, limited home support, advanced age, inability to perform IADLs, inability to perform ADLs, inability to navigate level surfaces without external assistance, inability to ambulate household distances, and limited insight into impairments. Pt will benefit from continued skilled PT services to address deficits as defined above and to maximize level of functional mobility to facilitate return toward PLOF and improved QOL. From PT/mobility standpoint, recommendation at time of d/c would be Level II (Moderate Resource Intensity in order to reduce fall risk and maximize pt's functional independence and consistency with mobility. Recommend trial with walker next 1-2 sessions and ther ex next 1-2 sessions.  Barriers to Discharge: Decreased caregiver support, Inaccessible home environment  Barriers to Discharge Comments: requires increased assistance to complete mobility, increased fall risk  Rehab Resource Intensity Level, PT: II (Moderate Resource Intensity)    See flowsheet documentation for full assessment.

## 2023-12-23 NOTE — PHYSICAL THERAPY NOTE
"PHYSICAL THERAPY EVALUATION  NAME:  Satya Bailey .  DATE: 12/23/23    AGE:   88 y.o.  Mrn:   [id number]  ADMIT DX:  Facial droop [R29.810]  Stroke-like symptom [R29.90]  Altered mental status, unspecified altered mental status type [R41.82]    Past Medical History:   Diagnosis Date    Anxiety     Benign prostatic hyperplasia     Bladder cancer (HCC) 2016    Bladder cancer (HCC) 2016    Dementia (HCC)     Depression     Enlarged prostate     Hyperlipidemia      Length Of Stay: 1  Performed at least 2 patient identifiers during session: Name and Birthday  PHYSICAL THERAPY EVALUATION :    12/23/23 0944   PT Last Visit   PT Visit Date 12/23/23   Note Type   Note type Evaluation   Pain Assessment   Pain Assessment Tool 0-10   Pain Score No Pain   Restrictions/Precautions   Other Precautions Cognitive;Chair Alarm;Bed Alarm;Fall Risk;Telemetry   Home Living   Type of Home Assisted living   Home Layout One level;Performs ADLs on one level;Able to live on main level with bedroom/bathroom   Home Equipment   (reports not using an AD PTA)   Additional Comments Pt questionable historian. Reports not using an AD PTA.   Prior Function   Lives With Facility staff   Receives Help From   (facility staff)   IADLs Family/Friend/Other provides transportation;Family/Friend/Other provides meals;Family/Friend/Other provides medication management   General   Additional Pertinent History pressure injury buttock   Cognition   Orientation Level Oriented to person   Following Commands Follows one step commands with increased time or repetition   Comments requires cues for increased attention to task, pt easily distracted, difficult to redirect, fixated on telemtry box   Subjective   Subjective \"I like to look at the cars going by.\"   RLE Assessment   RLE Assessment WFL  (3+/5, inc genu varus right LE > left)   LLE Assessment   LLE Assessment WFL  (3+/5, inc genu varus right LE > left)   Coordination   Movements are Fluid and Coordinated " "  (appears impaired left LE with swing through)   Rapid Alternating Movements Impaired   Light Touch   RLE Light Touch Grossly intact   LLE Light Touch Grossly intact   Bed Mobility   Additional Comments Pt sitting on EOB upon arrival to room.   Transfers   Sit to Stand   (steadying assistance)   Additional items Impulsive;Verbal cues   Stand to Sit   (steadying assistance)   Additional items Impulsive;Verbal cues   Stand pivot 4  Minimal assistance   Additional items Assist x 1;Increased time required;Verbal cues   Additional Comments no AD. steadying assistance for safety and balance wiht transfers sit<>stand. spt wiht minAx1 with inc left knee increased flexion and patient reaching for external support.   Ambulation/Elevation   Gait pattern Wide KAY;Improper Weight shift;Decreased foot clearance;Short stride;Excessively slow;Knees flexed  (reaching for externla support)   Gait Assistance 4  Minimal assist   Additional items Assist x 1;Verbal cues   Assistive Device None   Distance ambulated 25'x1 wthout AD with minAx1 with NBOS, decreased step elgnth and foot clearance, reaching for external support, manual cues for balance and wt shifting   Balance   Static Sitting Good   Dynamic Sitting Fair +   Static Standing Fair -   Dynamic Standing Poor +   Ambulatory Poor +   Activity Tolerance   Activity Tolerance Patient limited by fatigue;Other (Comment)  (safety concerns, impaired cognition)   Medical Staff Made Aware Elissa POWELL   Nurse Made Aware April MARTINEZ   Assessment   Prognosis Good   Problem List Decreased strength;Decreased endurance;Impaired balance;Decreased mobility;Decreased coordination;Impaired judgement;Decreased cognition;Decreased safety awareness;Obesity   Barriers to Discharge Decreased caregiver support;Inaccessible home environment   Barriers to Discharge Comments requires increased assistance to complete mobility, increased fall risk   Goals   Patient Goals \"Watch the cars.\"   STG Expiration Date " 01/06/24   PT Treatment Day 1   Plan   Treatment/Interventions ADL retraining;Functional transfer training;LE strengthening/ROM;Elevations;Therapeutic exercise;Endurance training;Patient/family training;Equipment eval/education;Bed mobility;Gait training;Compensatory technique education;Spoke to nursing;Spoke to case management;OT;Cognitive reorientation   PT Frequency 3-5x/wk   Discharge Recommendation   Rehab Resource Intensity Level, PT II (Moderate Resource Intensity)   Equipment Recommended   (anticipate need for walker)   AM-PAC Basic Mobility Inpatient   Turning in Flat Bed Without Bedrails 3   Lying on Back to Sitting on Edge of Flat Bed Without Bedrails 3   Moving Bed to Chair 3   Standing Up From Chair Using Arms 3   Walk in Room 3   Climb 3-5 Stairs With Railing 2   Basic Mobility Inpatient Raw Score 17   Basic Mobility Standardized Score 39.67   Highest Level Of Mobility   -HLM Goal 5: Stand one or more mins   -HLM Achieved 7: Walk 25 feet or more   Additional Treatment Session   Start Time 0959   End Time 1009   Treatment Assessment Pt tolerated session fairly well. he was able to ambulate increased distance with RW. He continues to require assistance for safe navigation wiht RW management. He requires cues for increased attention to task with frequent redirection. He is limtie dby decreased strenght, balance and endurance and safety concerns with impaired cognition. [facility]   Equipment Use initiated use of RW. sit<>stand with RW with steadying assistance with inc time wiht mod verbal cues and min manual cues for hand placement on RW as patient was focused on telemetry box. spt with RW with minimal to steadying asisstance wiht verbal cues for tunring completely prior to sitting and staying wihtin KAY of RW and min manual cues for RW management with turns. ambulated 85'x1 with RW with steadying to minAx1 with slow lindsey, inc trunk flexion,d  ecreased step length. min cues to stay wihtin KAY of RW, RW management when turning and for safe navigation. verbal cues for increased focus and attention on task as patient is easily distracted.   End of Consult   Patient Position at End of Consult Bedside chair;Bed/Chair alarm activated;All needs within reach       Pt requires PT/OT co-eval due to medical complexity, safety concerns, fall risk, significant assistance with mobility and/or cognitive-behavioral impairments.    (Please find full objective findings from PT assessment regarding body systems outlined above).     Assessment: Pt is a 88 y.o. male seen for PT evaluation s/p admission to [facility] on 12/22/2023 with Stroke-like symptoms.  Order placed for PT services.  Upon evaluation: Pt is presenting with impaired functional mobility due to decreased strength, decreased endurance, impaired balance, impaired coordination, gait deviations, impaired cognition, decreased safety awareness, impaired judgment, impaired hearing, fall risk, and impaired skin integrity requiring  steadying to minimal assistance for transfers and minimal assistance for ambulation with out AD . Pt's clinical presentation is currently unpredictable given the functional mobility deficits above, especially weakness, decreased endurance, impaired balance, gait deviations, decreased activity tolerance, decreased functional mobility tolerance, decreased safety awareness, impaired judgement, and decreased cognition, coupled with fall risks as indicated by AM-PAC 6-Clicks: 17/24 as well as impulsivity, impaired balance, polypharmacy, impaired coordination, impaired judgement, decreased safety awareness, and decreased cognition and combined with medical complications of abnormal potassium values, impulsivity during admission, and need for input for mobility technique/safety, metabolic encephalopathy, LE edema.  Pt's PMHx and comorbidities that may affect physical  performance and progress include: anxiety, Dementia, depression, and BPH, h/o bladder CA . Personal factors affecting pt at time of IE include: inaccessible home environment, limited home support, advanced age, inability to perform IADLs, inability to perform ADLs, inability to navigate level surfaces without external assistance, inability to ambulate household distances, and limited insight into impairments. Pt will benefit from continued skilled PT services to address deficits as defined above and to maximize level of functional mobility to facilitate return toward PLOF and improved QOL. From PT/mobility standpoint, recommendation at time of d/c would be Level II (Moderate Resource Intensity in order to reduce fall risk and maximize pt's functional independence and consistency with mobility. Recommend trial with walker next 1-2 sessions and ther ex next 1-2 sessions.       The patient's AM-PAC Basic Mobility Inpatient Short Form Raw Score is 17. A Raw score of greater than 16 suggests the patient may benefit from discharge to home. Please also refer to the recommendation of the Physical Therapist for safe discharge planning.       Goals: Pt will: Perform bed mobility tasks with supervision to reposition in bed and prepare for transfers. Pt will perform transfers with supervision to decrease burden of care, decrease risk for falls, and improve activity tolerance and prepare for ambulation. Pt will ambulate with LRAD for >/= 250' with  supervision  to decrease burden of care, decrease risk for falls, improve activity tolerance, and improve gait quality and to access home environment. Pt will participate in objective balance assessment to determine baseline fall risk. Pt will participate in SSWS assessment to determine level of mobility. Pt will increase B LE strength >/= 1/2 MMT grade to facilitate functional mobility.      Rupal Lopez, PT,DPT

## 2023-12-24 ENCOUNTER — APPOINTMENT (INPATIENT)
Dept: NON INVASIVE DIAGNOSTICS | Facility: HOSPITAL | Age: 88
DRG: 069 | End: 2023-12-24
Payer: MEDICARE

## 2023-12-24 LAB
ANION GAP SERPL CALCULATED.3IONS-SCNC: 7 MMOL/L
AORTIC ROOT: 4.4 CM
AORTIC VALVE MEAN VELOCITY: 9.2 M/S
APICAL FOUR CHAMBER EJECTION FRACTION: 63 %
ASCENDING AORTA: 3.1 CM
AV AREA BY CONTINUOUS VTI: 5.6 CM2
AV AREA PEAK VELOCITY: 4.9 CM2
AV LVOT MEAN GRADIENT: 3 MMHG
AV LVOT PEAK GRADIENT: 7 MMHG
AV MEAN GRADIENT: 4 MMHG
AV PEAK GRADIENT: 8 MMHG
AV VALVE AREA: 5.56 CM2
AV VELOCITY RATIO: 0.92
BUN SERPL-MCNC: 14 MG/DL (ref 5–25)
CALCIUM SERPL-MCNC: 9 MG/DL (ref 8.4–10.2)
CHLORIDE SERPL-SCNC: 105 MMOL/L (ref 96–108)
CO2 SERPL-SCNC: 27 MMOL/L (ref 21–32)
CREAT SERPL-MCNC: 0.82 MG/DL (ref 0.6–1.3)
DOP CALC AO PEAK VEL: 1.43 M/S
DOP CALC AO VTI: 30.59 CM
DOP CALC LVOT AREA: 5.31 CM2
DOP CALC LVOT CARDIAC INDEX: 5.32 L/MIN/M2
DOP CALC LVOT CARDIAC OUTPUT: 11.53 L/MIN
DOP CALC LVOT DIAMETER: 2.6 CM
DOP CALC LVOT PEAK VEL VTI: 32.06 CM
DOP CALC LVOT PEAK VEL: 1.31 M/S
DOP CALC LVOT STROKE INDEX: 78.3 ML/M2
DOP CALC LVOT STROKE VOLUME: 170.13
E WAVE DECELERATION TIME: 304 MS
E/A RATIO: 0.75
ERYTHROCYTE [DISTWIDTH] IN BLOOD BY AUTOMATED COUNT: 12.5 % (ref 11.6–15.1)
FRACTIONAL SHORTENING: 32 (ref 28–44)
GFR SERPL CREATININE-BSD FRML MDRD: 78 ML/MIN/1.73SQ M
GLUCOSE SERPL-MCNC: 105 MG/DL (ref 65–140)
HCT VFR BLD AUTO: 39.3 % (ref 36.5–49.3)
HGB BLD-MCNC: 13.5 G/DL (ref 12–17)
INTERVENTRICULAR SEPTUM IN DIASTOLE (PARASTERNAL SHORT AXIS VIEW): 1.2 CM
INTERVENTRICULAR SEPTUM: 1.2 CM (ref 0.6–1.1)
LAAS-AP2: 14 CM2
LAAS-AP4: 12.7 CM2
LEFT ATRIUM SIZE: 3.5 CM
LEFT ATRIUM VOLUME (MOD BIPLANE): 34 ML
LEFT ATRIUM VOLUME INDEX (MOD BIPLANE): 15.7 ML/M2
LEFT INTERNAL DIMENSION IN SYSTOLE: 3.2 CM (ref 2.1–4)
LEFT VENTRICLE DIASTOLIC VOLUME (MOD BIPLANE): 103 ML
LEFT VENTRICLE SYSTOLIC VOLUME (MOD BIPLANE): 33 ML
LEFT VENTRICULAR INTERNAL DIMENSION IN DIASTOLE: 4.7 CM (ref 3.5–6)
LEFT VENTRICULAR POSTERIOR WALL IN END DIASTOLE: 1.3 CM
LEFT VENTRICULAR STROKE VOLUME: 60 ML
LV EF: 68 %
LVSV (TEICH): 60 ML
MAGNESIUM SERPL-MCNC: 2 MG/DL (ref 1.9–2.7)
MCH RBC QN AUTO: 30.5 PG (ref 26.8–34.3)
MCHC RBC AUTO-ENTMCNC: 34.4 G/DL (ref 31.4–37.4)
MCV RBC AUTO: 89 FL (ref 82–98)
MRSA NOSE QL CULT: NORMAL
MV E'TISSUE VEL-LAT: 8 CM/S
MV E'TISSUE VEL-SEP: 9 CM/S
MV PEAK A VEL: 0.65 M/S
MV PEAK E VEL: 49 CM/S
MV STENOSIS PRESSURE HALF TIME: 88 MS
MV VALVE AREA P 1/2 METHOD: 2.5
PLATELET # BLD AUTO: 204 THOUSANDS/UL (ref 149–390)
PMV BLD AUTO: 9.9 FL (ref 8.9–12.7)
POTASSIUM SERPL-SCNC: 3.5 MMOL/L (ref 3.5–5.3)
RA PRESSURE ESTIMATED: 3 MMHG
RBC # BLD AUTO: 4.43 MILLION/UL (ref 3.88–5.62)
RIGHT ATRIUM AREA SYSTOLE A4C: 14.8 CM2
RIGHT VENTRICLE ID DIMENSION: 3.3 CM
SL CV LEFT ATRIUM LENGTH A2C: 4 CM
SL CV LV EF: 65
SL CV PED ECHO LEFT VENTRICLE DIASTOLIC VOLUME (MOD BIPLANE) 2D: 101 ML
SL CV PED ECHO LEFT VENTRICLE SYSTOLIC VOLUME (MOD BIPLANE) 2D: 41 ML
SODIUM SERPL-SCNC: 139 MMOL/L (ref 135–147)
TRICUSPID ANNULAR PLANE SYSTOLIC EXCURSION: 2.1 CM
WBC # BLD AUTO: 6.85 THOUSAND/UL (ref 4.31–10.16)

## 2023-12-24 PROCEDURE — 99232 SBSQ HOSP IP/OBS MODERATE 35: CPT

## 2023-12-24 PROCEDURE — 93306 TTE W/DOPPLER COMPLETE: CPT

## 2023-12-24 PROCEDURE — 80048 BASIC METABOLIC PNL TOTAL CA: CPT

## 2023-12-24 PROCEDURE — 85027 COMPLETE CBC AUTOMATED: CPT

## 2023-12-24 PROCEDURE — 83735 ASSAY OF MAGNESIUM: CPT

## 2023-12-24 RX ORDER — CYANOCOBALAMIN 1000 UG/ML
100 INJECTION, SOLUTION INTRAMUSCULAR; SUBCUTANEOUS DAILY
Status: DISCONTINUED | OUTPATIENT
Start: 2023-12-24 | End: 2023-12-27 | Stop reason: HOSPADM

## 2023-12-24 RX ORDER — CEPHALEXIN 500 MG/1
500 CAPSULE ORAL EVERY 6 HOURS SCHEDULED
Status: DISCONTINUED | OUTPATIENT
Start: 2023-12-24 | End: 2023-12-27 | Stop reason: HOSPADM

## 2023-12-24 RX ADMIN — TAMSULOSIN HYDROCHLORIDE 0.4 MG: 0.4 CAPSULE ORAL at 15:33

## 2023-12-24 RX ADMIN — CYANOCOBALAMIN 100 MCG: 1000 INJECTION, SOLUTION INTRAMUSCULAR; SUBCUTANEOUS at 11:27

## 2023-12-24 RX ADMIN — ATORVASTATIN CALCIUM 40 MG: 40 TABLET, FILM COATED ORAL at 17:07

## 2023-12-24 RX ADMIN — CEFAZOLIN SODIUM 2000 MG: 2 SOLUTION INTRAVENOUS at 04:39

## 2023-12-24 RX ADMIN — CEFAZOLIN SODIUM 2000 MG: 2 SOLUTION INTRAVENOUS at 11:27

## 2023-12-24 RX ADMIN — ASPIRIN 81 MG CHEWABLE TABLET 81 MG: 81 TABLET CHEWABLE at 11:27

## 2023-12-24 RX ADMIN — HEPARIN SODIUM 5000 UNITS: 5000 INJECTION INTRAVENOUS; SUBCUTANEOUS at 04:39

## 2023-12-24 RX ADMIN — MELATONIN 3 MG: 3 TAB ORAL at 22:32

## 2023-12-24 RX ADMIN — CEPHALEXIN 500 MG: 500 CAPSULE ORAL at 22:33

## 2023-12-24 RX ADMIN — SERTRALINE HYDROCHLORIDE 50 MG: 50 TABLET ORAL at 11:27

## 2023-12-24 RX ADMIN — CEPHALEXIN 500 MG: 500 CAPSULE ORAL at 17:07

## 2023-12-24 RX ADMIN — DONEPEZIL HYDROCHLORIDE 5 MG: 5 TABLET ORAL at 22:32

## 2023-12-24 RX ADMIN — FINASTERIDE 5 MG: 5 TABLET, FILM COATED ORAL at 11:27

## 2023-12-24 RX ADMIN — HEPARIN SODIUM 5000 UNITS: 5000 INJECTION INTRAVENOUS; SUBCUTANEOUS at 15:32

## 2023-12-24 RX ADMIN — HEPARIN SODIUM 5000 UNITS: 5000 INJECTION INTRAVENOUS; SUBCUTANEOUS at 22:33

## 2023-12-24 RX ADMIN — CLOPIDOGREL 75 MG: 75 TABLET ORAL at 11:27

## 2023-12-24 NOTE — ASSESSMENT & PLAN NOTE
POA - patient from nursing facility and noted to be more confused than normal with left sided facial droop and lean. No last known well  CT head/ CT head and neck without acute ischemia or stenosis  Permissive htn  Tele, echo   TSH, folate, vitamin d normal  B12 low, start on b12 injections x 5 days and then oral b12 supplements  A1c 5.7%  Lipid panel WNL  PT/OT  MRI brain: No acute infarction, intra hemorrhage or mass effect. trace, chronic microangiopathy   Neuro consulted -recommending aspirin, statin and stroke work up; suspect possibly TIA, load with plavix 300 mg and then do plavix 75 x 21 days tomorrow. Continue ASA. Follow up with echo and also eeg.   Symptoms remain improved, still with leaning, but no further left facial droop.

## 2023-12-24 NOTE — PLAN OF CARE
Problem: PAIN - ADULT  Goal: Verbalizes/displays adequate comfort level or baseline comfort level  Description: Interventions:  - Encourage patient to monitor pain and request assistance  - Assess pain using appropriate pain scale  - Administer analgesics based on type and severity of pain and evaluate response  - Implement non-pharmacological measures as appropriate and evaluate response  - Consider cultural and social influences on pain and pain management  - Notify physician/advanced practitioner if interventions unsuccessful or patient reports new pain  Outcome: Progressing     Problem: INFECTION - ADULT  Goal: Absence or prevention of progression during hospitalization  Description: INTERVENTIONS:  - Assess and monitor for signs and symptoms of infection  - Monitor lab/diagnostic results  - Monitor all insertion sites, i.e. indwelling lines, tubes, and drains  - Monitor endotracheal if appropriate and nasal secretions for changes in amount and color  - [place] appropriate cooling/warming therapies per order  - Administer medications as ordered  - Instruct and encourage patient and family to use good hand hygiene technique  - Identify and instruct in appropriate isolation precautions for identified infection/condition  Outcome: Progressing     Problem: SAFETY ADULT  Goal: Patient will remain free of falls  Description: INTERVENTIONS:  - Educate patient/family on patient safety including physical limitations  - Instruct patient to call for assistance with activity   - Consult OT/PT to assist with strengthening/mobility   - Keep Call bell within reach  - Keep bed low and locked with side rails adjusted as appropriate  - Keep care items and personal belongings within reach  - Initiate and maintain comfort rounds  - Make Fall Risk Sign visible to staff  - Offer Toileting every 2 Hours, in advance of need  - Initiate/Maintain bed alarm  - Obtain necessary fall risk management equipment:  - Apply yellow socks and  bracelet for high fall risk patients  - Consider moving patient to room near nurses station  Outcome: Progressing  Goal: Maintain or return to baseline ADL function  Description: INTERVENTIONS:  -  Assess patient's ability to carry out ADLs; assess patient's baseline for ADL function and identify physical deficits which impact ability to perform ADLs (bathing, care of mouth/teeth, toileting, grooming, dressing, etc.)  - Assess/evaluate cause of self-care deficits   - Assess range of motion  - Assess patient's mobility; develop plan if impaired  - Assess patient's need for assistive devices and provide as appropriate  - Encourage maximum independence but intervene and supervise when necessary  - Involve family in performance of ADLs  - Assess for home care needs following discharge   - Consider OT consult to assist with ADL evaluation and planning for discharge  - Provide patient education as appropriate  Outcome: Progressing  Goal: Maintains/Returns to pre admission functional level  Description: INTERVENTIONS:  - Perform AM-PAC 6 Click Basic Mobility/ Daily Activity assessment daily.  - Set and communicate daily mobility goal to care team and patient/family/caregiver.   - Collaborate with rehabilitation services on mobility goals if consulted  - Record patient progress and toleration of activity level   Outcome: Progressing     Problem: DISCHARGE PLANNING  Goal: Discharge to home or other facility with appropriate resources  Description: INTERVENTIONS:  - Identify barriers to discharge w/patient and caregiver  - Arrange for needed discharge resources and transportation as appropriate  - Identify discharge learning needs (meds, wound care, etc.)  - Arrange for interpretive services to assist at discharge as needed  - Refer to Case Management Department for coordinating discharge planning if the patient needs post-hospital services based on physician/advanced practitioner order or complex needs related to functional  status, cognitive ability, or social support system  Outcome: Progressing     Problem: Knowledge Deficit  Goal: Patient/family/caregiver demonstrates understanding of disease process, treatment plan, medications, and discharge instructions  Description: Complete learning assessment and assess knowledge base.  Interventions:  - Provide teaching at level of understanding  - Provide teaching via preferred learning methods  Outcome: Progressing     Problem: Neurological Deficit  Goal: Neurological status is stable or improving  Description: Interventions:  - Monitor and assess patient's level of consciousness, motor function, sensory function, and level of assistance needed for ADLs.   - Monitor and report changes from baseline. Collaborate with interdisciplinary team to initiate plan and implement interventions as ordered.   - Provide and maintain a safe environment.  - Consider seizure precautions.  - Consider fall precautions.  - Consider aspiration precautions.  - Consider bleeding precautions.  Outcome: Progressing     Problem: Activity Intolerance/Impaired Mobility  Goal: Mobility/activity is maintained at optimum level for patient  Description: Interventions:  - Assess and monitor patient  barriers to mobility and need for assistive/adaptive devices.  - Assess patient's emotional response to limitations.  - Collaborate with interdisciplinary team and initiate plans and interventions as ordered.  - Encourage independent activity per ability.  - Maintain proper body alignment.  - Perform active/passive rom as tolerated/ordered.  - Plan activities to conserve energy.  - Turn patient as appropriate  Outcome: Progressing     Problem: Communication Impairment  Goal: Ability to express needs and understand communication  Description: Assess patient's communication skills and ability to understand information.  Patient will demonstrate use of effective communication techniques, alternative methods of communication and  understanding even if not able to speak.     - Encourage communication and provide alternate methods of communication as needed.  - Collaborate with case management/ for discharge needs.  - Include patient/family/caregiver in decisions related to communication.  Outcome: Progressing     Problem: Potential for Aspiration  Goal: Non-ventilated patient's risk of aspiration is minimized  Description: Assess and monitor vital signs, respiratory status, and labs (WBC).  Monitor for signs of aspiration (tachypnea, cough, rales, wheezing, cyanosis, fever).    - Assess and monitor patient's ability to swallow.  - Place patient up in chair to eat if possible.  - HOB up at 90 degrees to eat if unable to get patient up into chair.  - Supervise patient during oral intake.   - Instruct patient/ family to take small bites.  - Instruct patient/ family to take small single sips when taking liquids.  - Follow patient-specific strategies generated by speech pathologist.  Outcome: Progressing     Problem: Nutrition  Goal: Nutrition/Hydration status is improving  Description: Monitor and assess patient's nutrition/hydration status for malnutrition (ex- brittle hair, bruises, dry skin, pale skin and conjunctiva, muscle wasting, smooth red tongue, and disorientation). Collaborate with interdisciplinary team and initiate plan and interventions as ordered.  Monitor patient's weight and dietary intake as ordered or per policy. Utilize nutrition screening tool and intervene per policy. Determine patient's food preferences and provide high-protein, high-caloric foods as appropriate.     - Assist patient with eating.  - Allow adequate time for meals.  - Encourage patient to take dietary supplement as ordered.  - Collaborate with clinical nutritionist.  - Include patient/family/caregiver in decisions related to nutrition.  Outcome: Progressing     Problem: Nutrition/Hydration-ADULT  Goal: Nutrient/Hydration intake appropriate for  improving, restoring or maintaining nutritional needs  Description: Monitor and assess patient's nutrition/hydration status for malnutrition. Collaborate with interdisciplinary team and initiate plan and interventions as ordered.  Monitor patient's weight and dietary intake as ordered or per policy. Utilize nutrition screening tool and intervene as necessary. Determine patient's food preferences and provide high-protein, high-caloric foods as appropriate.     INTERVENTIONS:  - Monitor oral intake, urinary output, labs, and treatment plans  - Assess nutrition and hydration status and recommend course of action  - Evaluate amount of meals eaten  - Assist patient with eating if necessary   - Allow adequate time for meals  - Recommend/ encourage appropriate diets, oral nutritional supplements, and vitamin/mineral supplements  - Order, calculate, and assess calorie counts as needed  - Recommend, monitor, and adjust tube feedings and TPN/PPN based on assessed needs  - Assess need for intravenous fluids  - Provide specific nutrition/hydration education as appropriate  - Include patient/family/caregiver in decisions related to nutrition  Outcome: Progressing

## 2023-12-24 NOTE — PLAN OF CARE
Problem: PAIN - ADULT  Goal: Verbalizes/displays adequate comfort level or baseline comfort level  Description: Interventions:  - Encourage patient to monitor pain and request assistance  - Assess pain using appropriate pain scale  - Administer analgesics based on type and severity of pain and evaluate response  - Implement non-pharmacological measures as appropriate and evaluate response  - Consider cultural and social influences on pain and pain management  - Notify physician/advanced practitioner if interventions unsuccessful or patient reports new pain  Outcome: Progressing     Problem: INFECTION - ADULT  Goal: Absence or prevention of progression during hospitalization  Description: INTERVENTIONS:  - Assess and monitor for signs and symptoms of infection  - Monitor lab/diagnostic results  - Monitor all insertion sites, i.e. indwelling lines, tubes, and drains  - Monitor endotracheal if appropriate and nasal secretions for changes in amount and color  - [place] appropriate cooling/warming therapies per order  - Administer medications as ordered  - Instruct and encourage patient and family to use good hand hygiene technique  - Identify and instruct in appropriate isolation precautions for identified infection/condition  Outcome: Progressing     Problem: SAFETY ADULT  Goal: Patient will remain free of falls  Description: INTERVENTIONS:  - Educate patient/family on patient safety including physical limitations  - Instruct patient to call for assistance with activity   - Consult OT/PT to assist with strengthening/mobility   - Keep Call bell within reach  - Keep bed low and locked with side rails adjusted as appropriate  - Keep care items and personal belongings within reach  - Initiate and maintain comfort rounds  - Make Fall Risk Sign visible to staff  - Offer Toileting every 2 Hours, in advance of need  - Initiate/Maintain bed/chair alarm  - Apply yellow socks and bracelet for high fall risk patients  -  Consider moving patient to room near nurses station  Outcome: Progressing  Goal: Maintain or return to baseline ADL function  Description: INTERVENTIONS:  -  Assess patient's ability to carry out ADLs; assess patient's baseline for ADL function and identify physical deficits which impact ability to perform ADLs (bathing, care of mouth/teeth, toileting, grooming, dressing, etc.)  - Assess/evaluate cause of self-care deficits   - Assess range of motion  - Assess patient's mobility; develop plan if impaired  - Assess patient's need for assistive devices and provide as appropriate  - Encourage maximum independence but intervene and supervise when necessary  - Involve family in performance of ADLs  - Assess for home care needs following discharge   - Consider OT consult to assist with ADL evaluation and planning for discharge  - Provide patient education as appropriate  Outcome: Progressing  Goal: Maintains/Returns to pre admission functional level  Description: INTERVENTIONS:  - Perform AM-PAC 6 Click Basic Mobility/ Daily Activity assessment daily.  - Set and communicate daily mobility goal to care team and patient/family/caregiver.   - Collaborate with rehabilitation services on mobility goals if consulted  - Ambulate patient 6 times a day  - Out of bed to chair 3 times a day   - Out of bed for toileting  - Record patient progress and toleration of activity level   Outcome: Progressing    Problem: DISCHARGE PLANNING  Goal: Discharge to home or other facility with appropriate resources  Description: INTERVENTIONS:  - Identify barriers to discharge w/patient and caregiver  - Arrange for needed discharge resources and transportation as appropriate  - Identify discharge learning needs (meds, wound care, etc.)  - Arrange for interpretive services to assist at discharge as needed  - Refer to Case Management Department for coordinating discharge planning if the patient needs post-hospital services based on physician/advanced  practitioner order or complex needs related to functional status, cognitive ability, or social support system  Outcome: Progressing     Problem: Knowledge Deficit  Goal: Patient/family/caregiver demonstrates understanding of disease process, treatment plan, medications, and discharge instructions  Description: Complete learning assessment and assess knowledge base.  Interventions:  - Provide teaching at level of understanding  - Provide teaching via preferred learning methods  Outcome: Progressing     Problem: Neurological Deficit  Goal: Neurological status is stable or improving  Description: Interventions:  - Monitor and assess patient's level of consciousness, motor function, sensory function, and level of assistance needed for ADLs.   - Monitor and report changes from baseline. Collaborate with interdisciplinary team to initiate plan and implement interventions as ordered.   - Provide and maintain a safe environment.  - Consider seizure precautions.  - Consider fall precautions.  - Consider aspiration precautions.  - Consider bleeding precautions.  Outcome: Progressing     Problem: Activity Intolerance/Impaired Mobility  Goal: Mobility/activity is maintained at optimum level for patient  Description: Interventions:  - Assess and monitor patient  barriers to mobility and need for assistive/adaptive devices.  - Assess patient's emotional response to limitations.  - Collaborate with interdisciplinary team and initiate plans and interventions as ordered.  - Encourage independent activity per ability.  - Maintain proper body alignment.  - Perform active/passive rom as tolerated/ordered.  - Plan activities to conserve energy.  - Turn patient as appropriate  Outcome: Progressing     Problem: Communication Impairment  Goal: Ability to express needs and understand communication  Description: Assess patient's communication skills and ability to understand information.  Patient will demonstrate use of effective  communication techniques, alternative methods of communication and understanding even if not able to speak.     - Encourage communication and provide alternate methods of communication as needed.  - Collaborate with case management/ for discharge needs.  - Include patient/family/caregiver in decisions related to communication.  Outcome: Progressing     Problem: Potential for Aspiration  Goal: Non-ventilated patient's risk of aspiration is minimized  Description: Assess and monitor vital signs, respiratory status, and labs (WBC).  Monitor for signs of aspiration (tachypnea, cough, rales, wheezing, cyanosis, fever).    - Assess and monitor patient's ability to swallow.  - Place patient up in chair to eat if possible.  - HOB up at 90 degrees to eat if unable to get patient up into chair.  - Supervise patient during oral intake.   - Instruct patient/ family to take small bites.  - Instruct patient/ family to take small single sips when taking liquids.  - Follow patient-specific strategies generated by speech pathologist.  Outcome: Progressing     Problem: Nutrition  Goal: Nutrition/Hydration status is improving  Description: Monitor and assess patient's nutrition/hydration status for malnutrition (ex- brittle hair, bruises, dry skin, pale skin and conjunctiva, muscle wasting, smooth red tongue, and disorientation). Collaborate with interdisciplinary team and initiate plan and interventions as ordered.  Monitor patient's weight and dietary intake as ordered or per policy. Utilize nutrition screening tool and intervene per policy. Determine patient's food preferences and provide high-protein, high-caloric foods as appropriate.     - Assist patient with eating.  - Allow adequate time for meals.  - Encourage patient to take dietary supplement as ordered.  - Collaborate with clinical nutritionist.  - Include patient/family/caregiver in decisions related to nutrition.  Outcome: Progressing     Problem:  Nutrition/Hydration-ADULT  Goal: Nutrient/Hydration intake appropriate for improving, restoring or maintaining nutritional needs  Description: Monitor and assess patient's nutrition/hydration status for malnutrition. Collaborate with interdisciplinary team and initiate plan and interventions as ordered.  Monitor patient's weight and dietary intake as ordered or per policy. Utilize nutrition screening tool and intervene as necessary. Determine patient's food preferences and provide high-protein, high-caloric foods as appropriate.     INTERVENTIONS:  - Monitor oral intake, urinary output, labs, and treatment plans  - Assess nutrition and hydration status and recommend course of action  - Evaluate amount of meals eaten  - Assist patient with eating if necessary   - Allow adequate time for meals  - Recommend/ encourage appropriate diets, oral nutritional supplements, and vitamin/mineral supplements  - Order, calculate, and assess calorie counts as needed  - Recommend, monitor, and adjust tube feedings and TPN/PPN based on assessed needs  - Assess need for intravenous fluids  - Provide specific nutrition/hydration education as appropriate  - Include patient/family/caregiver in decisions related to nutrition  Outcome: Progressing     Problem: Prexisting or High Potential for Compromised Skin Integrity  Goal: Skin integrity is maintained or improved  Description: INTERVENTIONS:  - Identify patients at risk for skin breakdown  - Assess and monitor skin integrity  - Assess and monitor nutrition and hydration status  - Monitor labs   - Assess for incontinence   - Turn and reposition patient  - Assist with mobility/ambulation  - Relieve pressure over bony prominences  - Avoid friction and shearing  - Provide appropriate hygiene as needed including keeping skin clean and dry  - Evaluate need for skin moisturizer/barrier cream  - Collaborate with interdisciplinary team   - Patient/family teaching  - Consider wound care consult    Outcome: Progressing

## 2023-12-24 NOTE — ASSESSMENT & PLAN NOTE
UA and infectious workup pending at this time   Blood cultures no growth at 24 hours.    No overt signs of infection, concern for lower extremity cellulitis and on IV ancef.   B12 low, started on b12 injections  Folate and vitamin d normal  Rest of plan under stroke like symptoms  Frequent reorientation and delirium precautions

## 2023-12-24 NOTE — ASSESSMENT & PLAN NOTE
Non pitting edema with erythema to bilateral shins  Will start on ancef for possible cellulitis, erythema improving, will de-escalate to keflex  CRP normal  MRSA pending

## 2023-12-24 NOTE — PROGRESS NOTES
[facility]  Progress Note  Name: Satya Hadley I  MRN: [id number]  Unit/Bed#: [facility] I Date of Admission: 12/22/2023   Date of Service: 12/24/2023 I Hospital Day: 2    Assessment/Plan   * Stroke-like symptoms  Assessment & Plan  POA - patient from nursing facility and noted to be more confused than normal with left sided facial droop and lean. No last known well  CT head/ CT head and neck without acute ischemia or stenosis  Permissive htn  Tele, echo   TSH, folate, vitamin d normal  B12 low, start on b12 injections x 5 days and then oral b12 supplements  A1c 5.7%  Lipid panel WNL  PT/OT  MRI brain: No acute infarction, intra hemorrhage or mass effect. trace, chronic microangiopathy   Neuro consulted -recommending aspirin, statin and stroke work up; suspect possibly TIA, load with plavix 300 mg and then do plavix 75 x 21 days tomorrow. Continue ASA. Follow up with echo and also eeg.   Symptoms remain improved, still with leaning, but no further left facial droop.     Metabolic encephalopathy  Assessment & Plan  UA and infectious workup pending at this time   Blood cultures no growth at 24 hours.    No overt signs of infection, concern for lower extremity cellulitis and on IV ancef.   B12 low, started on b12 injections  Folate and vitamin d normal  Rest of plan under stroke like symptoms  Frequent reorientation and delirium precautions    Bilateral lower extremity edema  Assessment & Plan  Non pitting edema with erythema to bilateral shins  Will start on ancef for possible cellulitis, erythema improving, will de-escalate to keflex  CRP normal  MRSA pending    Mild dementia without behavioral disturbance, psychotic disturbance, mood disturbance, or anxiety (HCC)  Assessment & Plan  Continue aricept  Currently at baseline-oriented to self and family.  Intermittently oriented to place         VTE Pharmacologic Prophylaxis: VTE Score: 4 Moderate Risk (Score 3-4) - Pharmacological DVT  "Prophylaxis Ordered: heparin.    Mobility:   Basic Mobility Inpatient Raw Score: 17  -HLM Goal: 5: Stand one or more mins  JH-HLM Achieved: 1: Laying in bed  HLM Goal NOT achieved. Continue with multidisciplinary rounding and encourage appropriate mobility to improve upon HLM goals.    Patient Centered Rounds: I performed bedside rounds with nursing staff today.   Discussions with Specialists or Other Care Team Provider: nursing, case management    Education and Discussions with Family / Patient: Updated  (son) at bedside.    Total Time Spent on Date of Encounter in care of patient: 30 mins. This time was spent on one or more of the following: performing physical exam; counseling and coordination of care; obtaining or reviewing history; documenting in the medical record; reviewing/ordering tests, medications or procedures; communicating with other healthcare professionals and discussing with patient's family/caregivers.    Current Length of Stay: 2 day(s)  Current Patient Status: Inpatient   Certification Statement: The patient will continue to require additional inpatient hospital stay due to PT/OT consult, EEG  Discharge Plan: Anticipate discharge in 48-72 hrs to discharge location to be determined pending rehab evaluations.    Code Status: Level 3 - DNAR and DNI    Subjective:   Patient seen and examined. He is doing well today, had a little agitation overnight per RN. Doing well in the morning, but when asking patient how he feels he said \"bad terrible\" because he had urinated on himself. No nausea, vomiting, diarrhea, constipation, abdominal pain, CP, SOB. Left facial droop remains improved. States that the left lean is improved as well.     Objective:     Vitals:   Temp (24hrs), Av.7 °F (36.5 °C), Min:97.5 °F (36.4 °C), Max:97.9 °F (36.6 °C)    Temp:  [97.5 °F (36.4 °C)-97.9 °F (36.6 °C)] 97.7 °F (36.5 °C)  HR:  [66-84] 75  Resp:  [17-18] 18  BP: ()/(56-62) 140/58  SpO2:  [96 %-99 %] " "99 %  Body mass index is 29.64 kg/m².     Input and Output Summary (last 24 hours):     Intake/Output Summary (Last 24 hours) at 12/24/2023 1242  Last data filed at 12/23/2023 1757  Gross per 24 hour   Intake 480 ml   Output --   Net 480 ml       Physical Exam:   Physical Exam  Vitals reviewed.   Constitutional:       General: He is not in acute distress.     Appearance: He is obese. He is ill-appearing. He is not toxic-appearing.   HENT:      Head: Normocephalic and atraumatic.      Mouth/Throat:      Mouth: Mucous membranes are moist.   Cardiovascular:      Rate and Rhythm: Normal rate and regular rhythm.      Heart sounds: No murmur heard.  Pulmonary:      Effort: No respiratory distress.      Breath sounds: No stridor. No wheezing.      Comments: Saturating well on room air  Abdominal:      General: Bowel sounds are normal. There is no distension.      Palpations: Abdomen is soft. There is no mass.      Tenderness: There is no abdominal tenderness.   Musculoskeletal:      Right lower leg: Edema present.      Left lower leg: Edema present.      Comments: Bilateral nonpitting edema   Skin:     General: Skin is warm and dry.   Neurological:      Mental Status: He is alert.      Comments: Oriented to self and partially place. When talking about [place] he says \" well it will be new years soon\"     Strength equal in bilateral upper and lower extremities - left   Psychiatric:         Mood and Affect: Mood normal.          Additional Data:     Labs:  Results from last 7 days   Lab Units 12/24/23  0609   WBC Thousand/uL 6.85   HEMOGLOBIN g/dL 13.5   HEMATOCRIT % 39.3   PLATELETS Thousands/uL 204     Results from last 7 days   Lab Units 12/24/23  0609 12/23/23  0539 12/22/23  1607   SODIUM mmol/L 139   < > 140   POTASSIUM mmol/L 3.5   < > 3.6   CHLORIDE mmol/L 105   < > 104   CO2 mmol/L 27   < > 30   BUN mg/dL 14   < > 18   CREATININE mg/dL 0.82   < > 0.87   ANION GAP mmol/L 7   < > 6   CALCIUM mg/dL 9.0   < > 9.2 "   ALBUMIN g/dL  --   --  4.1   TOTAL BILIRUBIN mg/dL  --   --  0.91   ALK PHOS U/L  --   --  48   ALT U/L  --   --  18   AST U/L  --   --  40*   GLUCOSE RANDOM mg/dL 105   < > 118    < > = values in this interval not displayed.     Results from last 7 days   Lab Units 12/22/23  1607   INR  1.01     Results from last 7 days   Lab Units 12/22/23  1556   POC GLUCOSE mg/dl 124     Results from last 7 days   Lab Units 12/23/23  0539   HEMOGLOBIN A1C % 5.7*           Lines/Drains:  Invasive Devices       Peripheral Intravenous Line  Duration             Peripheral IV 12/23/23 Distal;Left;Ventral (anterior) Forearm <1 day                      Telemetry:  Telemetry Orders (From admission, onward)               24 Hour Telemetry Monitoring  Continuous x 24 Hours (Telem)        Expiring   Question:  Reason for 24 Hour Telemetry  Answer:  TIA/Suspected CVA/ Confirmed CVA                     Telemetry Reviewed: Normal Sinus Rhythm  Indication for Continued Telemetry Use: No indication for continued use. Will discontinue.              Imaging: Reviewed radiology reports from this admission including: MRI brain    Recent Cultures (last 7 days):   Results from last 7 days   Lab Units 12/22/23  1650   BLOOD CULTURE  No Growth at 24 hrs.  No Growth at 24 hrs.       Last 24 Hours Medication List:   Current Facility-Administered Medications   Medication Dose Route Frequency Provider Last Rate    aspirin  81 mg Oral Daily [facility] Grissom-CEFERINO      atorvastatin  40 mg Oral QPM Lamar Praveena, PA-C      cephalexin  500 mg Oral Q6H [facility] Menezes-CEFERINO      clopidogrel  75 mg Oral Daily [facility]OSSEIN Menezes-CEFERINO      cyanocobalamin  100 mcg Intramuscular Daily [facility]OSSEIN Menezes-CEFERINO      donepezil  5 mg Oral HS Lamar Moca, PA-C      finasteride  5 mg Oral Daily Lamar Moca, PA-C      heparin (porcine)  5,000 Units Subcutaneous Q8H BOBBY [facility] Grissom-CEFERINO      melatonin  3 mg Oral HS Lamar Moca, PA-C      sertraline  50 mg Oral  Daily Lamar Grissom PA-C      tamsulosin  0.4 mg Oral Daily With Dinner Lamar Grissom PA-C          Today, Patient Was Seen By: Serene Cisneros PA-C    **Please Note: This note may have been constructed using a voice recognition system.**

## 2023-12-25 LAB
ANION GAP SERPL CALCULATED.3IONS-SCNC: 8 MMOL/L
BUN SERPL-MCNC: 14 MG/DL (ref 5–25)
CALCIUM SERPL-MCNC: 9.3 MG/DL (ref 8.4–10.2)
CHLORIDE SERPL-SCNC: 105 MMOL/L (ref 96–108)
CO2 SERPL-SCNC: 28 MMOL/L (ref 21–32)
CREAT SERPL-MCNC: 0.78 MG/DL (ref 0.6–1.3)
ERYTHROCYTE [DISTWIDTH] IN BLOOD BY AUTOMATED COUNT: 12.6 % (ref 11.6–15.1)
GFR SERPL CREATININE-BSD FRML MDRD: 80 ML/MIN/1.73SQ M
GLUCOSE SERPL-MCNC: 90 MG/DL (ref 65–140)
HCT VFR BLD AUTO: 42.9 % (ref 36.5–49.3)
HGB BLD-MCNC: 14.4 G/DL (ref 12–17)
MAGNESIUM SERPL-MCNC: 2 MG/DL (ref 1.9–2.7)
MCH RBC QN AUTO: 30.3 PG (ref 26.8–34.3)
MCHC RBC AUTO-ENTMCNC: 33.6 G/DL (ref 31.4–37.4)
MCV RBC AUTO: 90 FL (ref 82–98)
PLATELET # BLD AUTO: 195 THOUSANDS/UL (ref 149–390)
PMV BLD AUTO: 10 FL (ref 8.9–12.7)
POTASSIUM SERPL-SCNC: 3.5 MMOL/L (ref 3.5–5.3)
RBC # BLD AUTO: 4.75 MILLION/UL (ref 3.88–5.62)
SODIUM SERPL-SCNC: 141 MMOL/L (ref 135–147)
WBC # BLD AUTO: 6.07 THOUSAND/UL (ref 4.31–10.16)

## 2023-12-25 PROCEDURE — 83735 ASSAY OF MAGNESIUM: CPT

## 2023-12-25 PROCEDURE — 85027 COMPLETE CBC AUTOMATED: CPT

## 2023-12-25 PROCEDURE — 80048 BASIC METABOLIC PNL TOTAL CA: CPT

## 2023-12-25 PROCEDURE — 99232 SBSQ HOSP IP/OBS MODERATE 35: CPT

## 2023-12-25 RX ADMIN — SERTRALINE HYDROCHLORIDE 50 MG: 50 TABLET ORAL at 08:46

## 2023-12-25 RX ADMIN — CYANOCOBALAMIN 100 MCG: 1000 INJECTION, SOLUTION INTRAMUSCULAR; SUBCUTANEOUS at 08:43

## 2023-12-25 RX ADMIN — HEPARIN SODIUM 5000 UNITS: 5000 INJECTION INTRAVENOUS; SUBCUTANEOUS at 05:16

## 2023-12-25 RX ADMIN — HEPARIN SODIUM 5000 UNITS: 5000 INJECTION INTRAVENOUS; SUBCUTANEOUS at 14:28

## 2023-12-25 RX ADMIN — ASPIRIN 81 MG CHEWABLE TABLET 81 MG: 81 TABLET CHEWABLE at 08:46

## 2023-12-25 RX ADMIN — ATORVASTATIN CALCIUM 40 MG: 40 TABLET, FILM COATED ORAL at 17:38

## 2023-12-25 RX ADMIN — CEPHALEXIN 500 MG: 500 CAPSULE ORAL at 12:00

## 2023-12-25 RX ADMIN — CEPHALEXIN 500 MG: 500 CAPSULE ORAL at 23:00

## 2023-12-25 RX ADMIN — FINASTERIDE 5 MG: 5 TABLET, FILM COATED ORAL at 08:46

## 2023-12-25 RX ADMIN — CEPHALEXIN 500 MG: 500 CAPSULE ORAL at 17:38

## 2023-12-25 RX ADMIN — TAMSULOSIN HYDROCHLORIDE 0.4 MG: 0.4 CAPSULE ORAL at 16:37

## 2023-12-25 RX ADMIN — DONEPEZIL HYDROCHLORIDE 5 MG: 5 TABLET ORAL at 21:18

## 2023-12-25 RX ADMIN — MELATONIN 3 MG: 3 TAB ORAL at 21:18

## 2023-12-25 RX ADMIN — CLOPIDOGREL 75 MG: 75 TABLET ORAL at 08:46

## 2023-12-25 RX ADMIN — HEPARIN SODIUM 5000 UNITS: 5000 INJECTION INTRAVENOUS; SUBCUTANEOUS at 21:18

## 2023-12-25 RX ADMIN — CEPHALEXIN 500 MG: 500 CAPSULE ORAL at 05:16

## 2023-12-25 NOTE — CASE MANAGEMENT
Case Management Discharge Planning Note    Patient name Satya Bailey .  Location /-01 MRN [id number]  : 1935 Date 2023       Current Admission Date: 2023  Current Admission Diagnosis:Stroke-like symptoms   Patient Active Problem List    Diagnosis Date Noted    Stroke-like symptoms 2023    Metabolic encephalopathy 2023    Bilateral lower extremity edema 2023    Elevated PSA 2023    History of bladder cancer 2023    Recurrent major depressive disorder, in partial remission (HCC) 2019    Mild dementia without behavioral disturbance, psychotic disturbance, mood disturbance, or anxiety (HCC) 2019    Severe obstructive sleep apnea       LOS (days): 3  Geometric Mean LOS (GMLOS) (days): 4.5  Days to GMLOS:1.7     OBJECTIVE:  Risk of Unplanned Readmission Score: 9.42         Current admission status: Inpatient   Preferred Pharmacy:   [facility] - [place] [address] [place] [address] [facility] [address]  Phone: [phone] Fax: [phone]    Primary Care Provider: Ronny Evans MD    Primary Insurance: MEDICARE  Secondary Insurance: BLUE CROSS    DISCHARGE DETAILS:        CM discussed STR placement with pts family, who is at bedside.  Family would like to discuss STR options, will get back to CM when they had reached a decision

## 2023-12-25 NOTE — PLAN OF CARE
Problem: PAIN - ADULT  Goal: Verbalizes/displays adequate comfort level or baseline comfort level  Description: Interventions:  - Encourage patient to monitor pain and request assistance  - Assess pain using appropriate pain scale  - Administer analgesics based on type and severity of pain and evaluate response  - Implement non-pharmacological measures as appropriate and evaluate response  - Consider cultural and social influences on pain and pain management  - Notify physician/advanced practitioner if interventions unsuccessful or patient reports new pain  Outcome: Progressing     Problem: INFECTION - ADULT  Goal: Absence or prevention of progression during hospitalization  Description: INTERVENTIONS:  - Assess and monitor for signs and symptoms of infection  - Monitor lab/diagnostic results  - Monitor all insertion sites, i.e. indwelling lines, tubes, and drains  - Monitor endotracheal if appropriate and nasal secretions for changes in amount and color  - Hoffman appropriate cooling/warming therapies per order  - Administer medications as ordered  - Instruct and encourage patient and family to use good hand hygiene technique  - Identify and instruct in appropriate isolation precautions for identified infection/condition  Outcome: Progressing     Problem: SAFETY ADULT  Goal: Patient will remain free of falls  Description: INTERVENTIONS:  - Educate patient/family on patient safety including physical limitations  - Instruct patient to call for assistance with activity   - Consult OT/PT to assist with strengthening/mobility   - Keep Call bell within reach  - Keep bed low and locked with side rails adjusted as appropriate  - Keep care items and personal belongings within reach  - Initiate and maintain comfort rounds  - Make Fall Risk Sign visible to staff  - Offer Toileting every 2 Hours, in advance of need  - Initiate/Maintain bed alarm  - Obtain necessary fall risk management equipment walker  - Apply yellow  socks and bracelet for high fall risk patients  - Consider moving patient to room near nurses station  Outcome: Progressing  Goal: Maintain or return to baseline ADL function  Description: INTERVENTIONS:  -  Assess patient's ability to carry out ADLs; assess patient's baseline for ADL function and identify physical deficits which impact ability to perform ADLs (bathing, care of mouth/teeth, toileting, grooming, dressing, etc.)  - Assess/evaluate cause of self-care deficits   - Assess range of motion  - Assess patient's mobility; develop plan if impaired  - Assess patient's need for assistive devices and provide as appropriate  - Encourage maximum independence but intervene and supervise when necessary  - Involve family in performance of ADLs  - Assess for home care needs following discharge   - Consider OT consult to assist with ADL evaluation and planning for discharge  - Provide patient education as appropriate  Outcome: Progressing  Goal: Maintains/Returns to pre admission functional level  Description: INTERVENTIONS:  - Perform AM-PAC 6 Click Basic Mobility/ Daily Activity assessment daily.  - Set and communicate daily mobility goal to care team and patient/family/caregiver.   - Collaborate with rehabilitation services on mobility goals if consulted  - Perform Range of Motion 3 times a day.  - Reposition patient every 2 hours.  - Dangle patient 3 times a day  - Stand patient 3 times a day  - Ambulate patient 3 times a day  - Out of bed to chair 3 times a day   - Out of bed for meals 3 times a day  - Out of bed for toileting  - Record patient progress and toleration of activity level   Outcome: Progressing     Problem: DISCHARGE PLANNING  Goal: Discharge to home or other facility with appropriate resources  Description: INTERVENTIONS:  - Identify barriers to discharge w/patient and caregiver  - Arrange for needed discharge resources and transportation as appropriate  - Identify discharge learning needs (meds,  wound care, etc.)  - Arrange for interpretive services to assist at discharge as needed  - Refer to Case Management Department for coordinating discharge planning if the patient needs post-hospital services based on physician/advanced practitioner order or complex needs related to functional status, cognitive ability, or social support system  Outcome: Progressing     Problem: Knowledge Deficit  Goal: Patient/family/caregiver demonstrates understanding of disease process, treatment plan, medications, and discharge instructions  Description: Complete learning assessment and assess knowledge base.  Interventions:  - Provide teaching at level of understanding  - Provide teaching via preferred learning methods  Outcome: Progressing     Problem: Neurological Deficit  Goal: Neurological status is stable or improving  Description: Interventions:  - Monitor and assess patient's level of consciousness, motor function, sensory function, and level of assistance needed for ADLs.   - Monitor and report changes from baseline. Collaborate with interdisciplinary team to initiate plan and implement interventions as ordered.   - Provide and maintain a safe environment.  - Consider seizure precautions.  - Consider fall precautions.  - Consider aspiration precautions.  - Consider bleeding precautions.  Outcome: Progressing     Problem: Activity Intolerance/Impaired Mobility  Goal: Mobility/activity is maintained at optimum level for patient  Description: Interventions:  - Assess and monitor patient  barriers to mobility and need for assistive/adaptive devices.  - Assess patient's emotional response to limitations.  - Collaborate with interdisciplinary team and initiate plans and interventions as ordered.  - Encourage independent activity per ability.  - Maintain proper body alignment.  - Perform active/passive rom as tolerated/ordered.  - Plan activities to conserve energy.  - Turn patient as appropriate  Outcome: Progressing      Problem: Communication Impairment  Goal: Ability to express needs and understand communication  Description: Assess patient's communication skills and ability to understand information.  Patient will demonstrate use of effective communication techniques, alternative methods of communication and understanding even if not able to speak.     - Encourage communication and provide alternate methods of communication as needed.  - Collaborate with case management/ for discharge needs.  - Include patient/family/caregiver in decisions related to communication.  Outcome: Progressing     Problem: Potential for Aspiration  Goal: Non-ventilated patient's risk of aspiration is minimized  Description: Assess and monitor vital signs, respiratory status, and labs (WBC).  Monitor for signs of aspiration (tachypnea, cough, rales, wheezing, cyanosis, fever).    - Assess and monitor patient's ability to swallow.  - Place patient up in chair to eat if possible.  - HOB up at 90 degrees to eat if unable to get patient up into chair.  - Supervise patient during oral intake.   - Instruct patient/ family to take small bites.  - Instruct patient/ family to take small single sips when taking liquids.  - Follow patient-specific strategies generated by speech pathologist.  Outcome: Progressing     Problem: Nutrition  Goal: Nutrition/Hydration status is improving  Description: Monitor and assess patient's nutrition/hydration status for malnutrition (ex- brittle hair, bruises, dry skin, pale skin and conjunctiva, muscle wasting, smooth red tongue, and disorientation). Collaborate with interdisciplinary team and initiate plan and interventions as ordered.  Monitor patient's weight and dietary intake as ordered or per policy. Utilize nutrition screening tool and intervene per policy. Determine patient's food preferences and provide high-protein, high-caloric foods as appropriate.     - Assist patient with eating.  - Allow adequate  time for meals.  - Encourage patient to take dietary supplement as ordered.  - Collaborate with clinical nutritionist.  - Include patient/family/caregiver in decisions related to nutrition.  Outcome: Progressing     Problem: Nutrition/Hydration-ADULT  Goal: Nutrient/Hydration intake appropriate for improving, restoring or maintaining nutritional needs  Description: Monitor and assess patient's nutrition/hydration status for malnutrition. Collaborate with interdisciplinary team and initiate plan and interventions as ordered.  Monitor patient's weight and dietary intake as ordered or per policy. Utilize nutrition screening tool and intervene as necessary. Determine patient's food preferences and provide high-protein, high-caloric foods as appropriate.     INTERVENTIONS:  - Monitor oral intake, urinary output, labs, and treatment plans  - Assess nutrition and hydration status and recommend course of action  - Evaluate amount of meals eaten  - Assist patient with eating if necessary   - Allow adequate time for meals  - Recommend/ encourage appropriate diets, oral nutritional supplements, and vitamin/mineral supplements  - Order, calculate, and assess calorie counts as needed  - Recommend, monitor, and adjust tube feedings and TPN/PPN based on assessed needs  - Assess need for intravenous fluids  - Provide specific nutrition/hydration education as appropriate  - Include patient/family/caregiver in decisions related to nutrition  Outcome: Progressing     Problem: Prexisting or High Potential for Compromised Skin Integrity  Goal: Skin integrity is maintained or improved  Description: INTERVENTIONS:  - Identify patients at risk for skin breakdown  - Assess and monitor skin integrity  - Assess and monitor nutrition and hydration status  - Monitor labs   - Assess for incontinence   - Turn and reposition patient  - Assist with mobility/ambulation  - Relieve pressure over bony prominences  - Avoid friction and shearing  -  Provide appropriate hygiene as needed including keeping skin clean and dry  - Evaluate need for skin moisturizer/barrier cream  - Collaborate with interdisciplinary team   - Patient/family teaching  - Consider wound care consult   Outcome: Progressing

## 2023-12-25 NOTE — ASSESSMENT & PLAN NOTE
POA - patient from nursing facility and noted to be more confused than normal with left sided facial droop and lean. No last known well  CT head/ CT head and neck without acute ischemia or stenosis  Permissive htn  TSH, folate, vitamin d normal  B12 low, start on b12 injections x 5 days and then oral b12 supplements  A1c 5.7%  Lipid panel WNL  PT/OT  MRI brain: No acute infarction, intra hemorrhage or mass effect. trace, chronic microangiopathy   Neuro consulted -recommending aspirin, statin and stroke work up; suspect possibly TIA, load with plavix 300 mg and then do plavix 75 x 21 days tomorrow. Continue ASA. Follow up with echo and also eeg.   Symptoms remain improved, still with leaning, but no further left facial droop.   Echo:  Wall thickness is mildly increased. There is mild concentric hypertrophy. The left ventricular ejection fraction is 65%. Systolic function is normal. Wall motion is normal. Diastolic function is normal.

## 2023-12-25 NOTE — ASSESSMENT & PLAN NOTE
Non pitting edema with erythema to bilateral shins  Will start on ancef for possible cellulitis, erythema improving, will de-escalate to keflex  CRP normal  MRSA normal

## 2023-12-25 NOTE — PLAN OF CARE
Problem: PAIN - ADULT  Goal: Verbalizes/displays adequate comfort level or baseline comfort level  Description: Interventions:  - Encourage patient to monitor pain and request assistance  - Assess pain using appropriate pain scale  - Administer analgesics based on type and severity of pain and evaluate response  - Implement non-pharmacological measures as appropriate and evaluate response  - Consider cultural and social influences on pain and pain management  - Notify physician/advanced practitioner if interventions unsuccessful or patient reports new pain  Outcome: Progressing     Problem: INFECTION - ADULT  Goal: Absence or prevention of progression during hospitalization  Description: INTERVENTIONS:  - Assess and monitor for signs and symptoms of infection  - Monitor lab/diagnostic results  - Monitor all insertion sites, i.e. indwelling lines, tubes, and drains  - Monitor endotracheal if appropriate and nasal secretions for changes in amount and color  - Vici appropriate cooling/warming therapies per order  - Administer medications as ordered  - Instruct and encourage patient and family to use good hand hygiene technique  - Identify and instruct in appropriate isolation precautions for identified infection/condition  Outcome: Progressing     Problem: SAFETY ADULT  Goal: Patient will remain free of falls  Description: INTERVENTIONS:  - Educate patient/family on patient safety including physical limitations  - Instruct patient to call for assistance with activity   - Consult OT/PT to assist with strengthening/mobility   - Keep Call bell within reach  - Keep bed low and locked with side rails adjusted as appropriate  - Keep care items and personal belongings within reach  - Initiate and maintain comfort rounds  - Make Fall Risk Sign visible to staff  - Offer Toileting every 2 Hours, in advance of need  - Initiate/Maintain bed alarm  - Obtain necessary fall risk management equipment  - Apply yellow socks and  bracelet for high fall risk patients  - Consider moving patient to room near nurses station  Outcome: Progressing  Goal: Maintain or return to baseline ADL function  Description: INTERVENTIONS:  -  Assess patient's ability to carry out ADLs; assess patient's baseline for ADL function and identify physical deficits which impact ability to perform ADLs (bathing, care of mouth/teeth, toileting, grooming, dressing, etc.)  - Assess/evaluate cause of self-care deficits   - Assess range of motion  - Assess patient's mobility; develop plan if impaired  - Assess patient's need for assistive devices and provide as appropriate  - Encourage maximum independence but intervene and supervise when necessary  - Involve family in performance of ADLs  - Assess for home care needs following discharge   - Consider OT consult to assist with ADL evaluation and planning for discharge  - Provide patient education as appropriate  Outcome: Progressing  Goal: Maintains/Returns to pre admission functional level  Description: INTERVENTIONS:  - Perform AM-PAC 6 Click Basic Mobility/ Daily Activity assessment daily.  - Set and communicate daily mobility goal to care team and patient/family/caregiver.   - Collaborate with rehabilitation services on mobility goals if consulted  - Record patient progress and toleration of activity level   Outcome: Progressing     Problem: DISCHARGE PLANNING  Goal: Discharge to home or other facility with appropriate resources  Description: INTERVENTIONS:  - Identify barriers to discharge w/patient and caregiver  - Arrange for needed discharge resources and transportation as appropriate  - Identify discharge learning needs (meds, wound care, etc.)  - Arrange for interpretive services to assist at discharge as needed  - Refer to Case Management Department for coordinating discharge planning if the patient needs post-hospital services based on physician/advanced practitioner order or complex needs related to functional  status, cognitive ability, or social support system  Outcome: Progressing     Problem: Knowledge Deficit  Goal: Patient/family/caregiver demonstrates understanding of disease process, treatment plan, medications, and discharge instructions  Description: Complete learning assessment and assess knowledge base.  Interventions:  - Provide teaching at level of understanding  - Provide teaching via preferred learning methods  Outcome: Progressing     Problem: Neurological Deficit  Goal: Neurological status is stable or improving  Description: Interventions:  - Monitor and assess patient's level of consciousness, motor function, sensory function, and level of assistance needed for ADLs.   - Monitor and report changes from baseline. Collaborate with interdisciplinary team to initiate plan and implement interventions as ordered.   - Provide and maintain a safe environment.  - Consider seizure precautions.  - Consider fall precautions.  - Consider aspiration precautions.  - Consider bleeding precautions.  Outcome: Progressing     Problem: Activity Intolerance/Impaired Mobility  Goal: Mobility/activity is maintained at optimum level for patient  Description: Interventions:  - Assess and monitor patient  barriers to mobility and need for assistive/adaptive devices.  - Assess patient's emotional response to limitations.  - Collaborate with interdisciplinary team and initiate plans and interventions as ordered.  - Encourage independent activity per ability.  - Maintain proper body alignment.  - Perform active/passive rom as tolerated/ordered.  - Plan activities to conserve energy.  - Turn patient as appropriate  Outcome: Progressing     Problem: Communication Impairment  Goal: Ability to express needs and understand communication  Description: Assess patient's communication skills and ability to understand information.  Patient will demonstrate use of effective communication techniques, alternative methods of communication and  understanding even if not able to speak.     - Encourage communication and provide alternate methods of communication as needed.  - Collaborate with case management/ for discharge needs.  - Include patient/family/caregiver in decisions related to communication.  Outcome: Progressing     Problem: Potential for Aspiration  Goal: Non-ventilated patient's risk of aspiration is minimized  Description: Assess and monitor vital signs, respiratory status, and labs (WBC).  Monitor for signs of aspiration (tachypnea, cough, rales, wheezing, cyanosis, fever).    - Assess and monitor patient's ability to swallow.  - Place patient up in chair to eat if possible.  - HOB up at 90 degrees to eat if unable to get patient up into chair.  - Supervise patient during oral intake.   - Instruct patient/ family to take small bites.  - Instruct patient/ family to take small single sips when taking liquids.  - Follow patient-specific strategies generated by speech pathologist.  Outcome: Progressing     Problem: Nutrition  Goal: Nutrition/Hydration status is improving  Description: Monitor and assess patient's nutrition/hydration status for malnutrition (ex- brittle hair, bruises, dry skin, pale skin and conjunctiva, muscle wasting, smooth red tongue, and disorientation). Collaborate with interdisciplinary team and initiate plan and interventions as ordered.  Monitor patient's weight and dietary intake as ordered or per policy. Utilize nutrition screening tool and intervene per policy. Determine patient's food preferences and provide high-protein, high-caloric foods as appropriate.     - Assist patient with eating.  - Allow adequate time for meals.  - Encourage patient to take dietary supplement as ordered.  - Collaborate with clinical nutritionist.  - Include patient/family/caregiver in decisions related to nutrition.  Outcome: Progressing     Problem: Nutrition/Hydration-ADULT  Goal: Nutrient/Hydration intake appropriate for  improving, restoring or maintaining nutritional needs  Description: Monitor and assess patient's nutrition/hydration status for malnutrition. Collaborate with interdisciplinary team and initiate plan and interventions as ordered.  Monitor patient's weight and dietary intake as ordered or per policy. Utilize nutrition screening tool and intervene as necessary. Determine patient's food preferences and provide high-protein, high-caloric foods as appropriate.     INTERVENTIONS:  - Monitor oral intake, urinary output, labs, and treatment plans  - Assess nutrition and hydration status and recommend course of action  - Evaluate amount of meals eaten  - Assist patient with eating if necessary   - Allow adequate time for meals  - Recommend/ encourage appropriate diets, oral nutritional supplements, and vitamin/mineral supplements  - Order, calculate, and assess calorie counts as needed  - Recommend, monitor, and adjust tube feedings and TPN/PPN based on assessed needs  - Assess need for intravenous fluids  - Provide specific nutrition/hydration education as appropriate  - Include patient/family/caregiver in decisions related to nutrition  Outcome: Progressing     Problem: Prexisting or High Potential for Compromised Skin Integrity  Goal: Skin integrity is maintained or improved  Description: INTERVENTIONS:  - Identify patients at risk for skin breakdown  - Assess and monitor skin integrity  - Assess and monitor nutrition and hydration status  - Monitor labs   - Assess for incontinence   - Turn and reposition patient  - Assist with mobility/ambulation  - Relieve pressure over bony prominences  - Avoid friction and shearing  - Provide appropriate hygiene as needed including keeping skin clean and dry  - Evaluate need for skin moisturizer/barrier cream  - Collaborate with interdisciplinary team   - Patient/family teaching  - Consider wound care consult   Outcome: Progressing

## 2023-12-25 NOTE — PROGRESS NOTES
[facility]  Progress Note  Name: Satya Hadley I  MRN: [id number]  Unit/Bed#: MS [facility] I Date of Admission: 12/22/2023   Date of Service: 12/25/2023 I Hospital Day: 3    Assessment/Plan   * Stroke-like symptoms  Assessment & Plan  POA - patient from nursing facility and noted to be more confused than normal with left sided facial droop and lean. No last known well  CT head/ CT head and neck without acute ischemia or stenosis  Permissive htn  TSH, folate, vitamin d normal  B12 low, start on b12 injections x 5 days and then oral b12 supplements  A1c 5.7%  Lipid panel WNL  PT/OT  MRI brain: No acute infarction, intra hemorrhage or mass effect. trace, chronic microangiopathy   Neuro consulted -recommending aspirin, statin and stroke work up; suspect possibly TIA, load with plavix 300 mg and then do plavix 75 x 21 days tomorrow. Continue ASA. Follow up with echo and also eeg.   Symptoms remain improved, still with leaning, but no further left facial droop.   Echo:  Wall thickness is mildly increased. There is mild concentric hypertrophy. The left ventricular ejection fraction is 65%. Systolic function is normal. Wall motion is normal. Diastolic function is normal.     Metabolic encephalopathy  Assessment & Plan  UA and infectious workup pending at this time   Blood cultures no growth at 24 hours.    No overt signs of infection, concern for lower extremity cellulitis and on IV ancef.   B12 low, started on b12 injections  Folate and vitamin d normal  Rest of plan under stroke like symptoms  Frequent reorientation and delirium precautions    Bilateral lower extremity edema  Assessment & Plan  Non pitting edema with erythema to bilateral shins  Will start on ancef for possible cellulitis, erythema improving, will de-escalate to keflex  CRP normal  MRSA normal    Mild dementia without behavioral disturbance, psychotic disturbance, mood disturbance, or anxiety (HCC)  Assessment & Plan  Continue  aricept  Currently at baseline-oriented to self and family.  Intermittently oriented to place         VTE Pharmacologic Prophylaxis: VTE Score: 4 Moderate Risk (Score 3-4) - Pharmacological DVT Prophylaxis Ordered: heparin.    Mobility:   Basic Mobility Inpatient Raw Score: 20  JH-HLM Goal: 6: Walk 10 steps or more  JH-HLM Achieved: 6: Walk 10 steps or more  HLM Goal achieved. Continue to encourage appropriate mobility.    Patient Centered Rounds: I performed bedside rounds with nursing staff today.   Discussions with Specialists or Other Care Team Provider: nursing, case management    Education and Discussions with Family / Patient: Updated  (son) at bedside.    Total Time Spent on Date of Encounter in care of patient: 30 mins. This time was spent on one or more of the following: performing physical exam; counseling and coordination of care; obtaining or reviewing history; documenting in the medical record; reviewing/ordering tests, medications or procedures; communicating with other healthcare professionals and discussing with patient's family/caregivers.    Current Length of Stay: 3 day(s)  Current Patient Status: Inpatient   Certification Statement: The patient will continue to require additional inpatient hospital stay due to EEG, discharge planning  Discharge Plan: Anticipate discharge in 24-48 hrs to rehab facility.    Code Status: Level 3 - DNAR and DNI    Subjective:   Patient seen and examined at bedside this morning.  He is doing well today and offers no acute complaints.  He is unsure of the month or where he is at.  After stating that it is December he then knew it was .  No nausea, vomiting, diarrhea, constipation, fever, chills, chest pain, shortness of breath.    Objective:     Vitals:   Temp (24hrs), Av.3 °F (36.3 °C), Min:97.3 °F (36.3 °C), Max:97.3 °F (36.3 °C)    Temp:  [97.3 °F (36.3 °C)] 97.3 °F (36.3 °C)  HR:  [63-65] 65  Resp:  [18] 18  BP: (136-148)/(56-66)  148/66  SpO2:  [97 %-100 %] 97 %  Body mass index is 29.64 kg/m².     Input and Output Summary (last 24 hours):     Intake/Output Summary (Last 24 hours) at 12/25/2023 1231  Last data filed at 12/25/2023 0900  Gross per 24 hour   Intake 180 ml   Output --   Net 180 ml       Physical Exam:   Physical Exam  Vitals reviewed.   Constitutional:       General: He is not in acute distress.     Appearance: He is obese. He is ill-appearing. He is not toxic-appearing.   HENT:      Head: Normocephalic and atraumatic.      Mouth/Throat:      Mouth: Mucous membranes are moist.   Cardiovascular:      Rate and Rhythm: Normal rate and regular rhythm.   Pulmonary:      Effort: No respiratory distress.      Breath sounds: No stridor. No wheezing.      Comments: Saturating well on room air  Abdominal:      General: Bowel sounds are normal. There is no distension.      Palpations: Abdomen is soft. There is no mass.      Tenderness: There is no abdominal tenderness.   Musculoskeletal:      Right lower leg: Edema present.      Left lower leg: Edema present.      Comments: Nonpitting edema   Skin:     General: Skin is warm and dry.   Neurological:      General: No focal deficit present.      Mental Status: He is alert and oriented to person, place, and time.   Psychiatric:         Mood and Affect: Mood normal.         Behavior: Behavior normal.          Additional Data:     Labs:  Results from last 7 days   Lab Units 12/25/23  0522   WBC Thousand/uL 6.07   HEMOGLOBIN g/dL 14.4   HEMATOCRIT % 42.9   PLATELETS Thousands/uL 195     Results from last 7 days   Lab Units 12/25/23  0522 12/23/23  0539 12/22/23  1607   SODIUM mmol/L 141   < > 140   POTASSIUM mmol/L 3.5   < > 3.6   CHLORIDE mmol/L 105   < > 104   CO2 mmol/L 28   < > 30   BUN mg/dL 14   < > 18   CREATININE mg/dL 0.78   < > 0.87   ANION GAP mmol/L 8   < > 6   CALCIUM mg/dL 9.3   < > 9.2   ALBUMIN g/dL  --   --  4.1   TOTAL BILIRUBIN mg/dL  --   --  0.91   ALK PHOS U/L  --   --  48    ALT U/L  --   --  18   AST U/L  --   --  40*   GLUCOSE RANDOM mg/dL 90   < > 118    < > = values in this interval not displayed.     Results from last 7 days   Lab Units 12/22/23  1607   INR  1.01     Results from last 7 days   Lab Units 12/22/23  1556   POC GLUCOSE mg/dl 124     Results from last 7 days   Lab Units 12/23/23  0539   HEMOGLOBIN A1C % 5.7*           Lines/Drains:  Invasive Devices       Peripheral Intravenous Line  Duration             Peripheral IV 12/23/23 Distal;Left;Ventral (anterior) Forearm 1 day                          Imaging: Reviewed radiology reports from this admission including: ECHO    Recent Cultures (last 7 days):   Results from last 7 days   Lab Units 12/22/23  1650   BLOOD CULTURE  No Growth at 48 hrs.  No Growth at 48 hrs.       Last 24 Hours Medication List:   Current Facility-Administered Medications   Medication Dose Route Frequency Provider Last Rate    aspirin  81 mg Oral Daily Lamar Hickory, PA-CEFERINO      atorvastatin  40 mg Oral QPM Lamar Praveena, PA-CEFERINO      cephalexin  500 mg Oral Q6H BOBBY Serene Cisneros PA-C      clopidogrel  75 mg Oral Daily HOSSEIN Menezes-CEFERINO      cyanocobalamin  100 mcg Intramuscular Daily Serene Cisneros PA-C      donepezil  5 mg Oral HS Lamar Hickory, PA-CEFERINO      finasteride  5 mg Oral Daily Lamar Hickory, PA-CEFERINO      heparin (porcine)  5,000 Units Subcutaneous Q8H BOBBY Lamar HickoryIVON      melatonin  3 mg Oral HS Lamar Hickory, PA-CEFERINO      sertraline  50 mg Oral Daily Lamar Hickory, PA-CEFERINO      tamsulosin  0.4 mg Oral Daily With Dinner Lamar Grissom PA-C          Today, Patient Was Seen By: Serene Cisneros PA-C    **Please Note: This note may have been constructed using a voice recognition system.**

## 2023-12-26 ENCOUNTER — APPOINTMENT (INPATIENT)
Dept: NON INVASIVE DIAGNOSTICS | Facility: HOSPITAL | Age: 88
DRG: 069 | End: 2023-12-26
Payer: MEDICARE

## 2023-12-26 ENCOUNTER — APPOINTMENT (INPATIENT)
Dept: CT IMAGING | Facility: HOSPITAL | Age: 88
DRG: 069 | End: 2023-12-26
Payer: MEDICARE

## 2023-12-26 ENCOUNTER — APPOINTMENT (INPATIENT)
Dept: NEUROLOGY | Facility: HOSPITAL | Age: 88
DRG: 069 | End: 2023-12-26
Payer: MEDICARE

## 2023-12-26 PROBLEM — R41.82 ALTERED MENTAL STATUS: Status: ACTIVE | Noted: 2023-12-26

## 2023-12-26 PROBLEM — I82.451 ACUTE DEEP VEIN THROMBOSIS (DVT) OF RIGHT PERONEAL VEIN (HCC): Status: ACTIVE | Noted: 2023-12-26

## 2023-12-26 PROCEDURE — 93970 EXTREMITY STUDY: CPT | Performed by: SURGERY

## 2023-12-26 PROCEDURE — 99232 SBSQ HOSP IP/OBS MODERATE 35: CPT

## 2023-12-26 PROCEDURE — 95816 EEG AWAKE AND DROWSY: CPT

## 2023-12-26 PROCEDURE — 95816 EEG AWAKE AND DROWSY: CPT | Performed by: PSYCHIATRY & NEUROLOGY

## 2023-12-26 PROCEDURE — 71275 CT ANGIOGRAPHY CHEST: CPT

## 2023-12-26 PROCEDURE — 93970 EXTREMITY STUDY: CPT

## 2023-12-26 PROCEDURE — G1004 CDSM NDSC: HCPCS

## 2023-12-26 RX ORDER — HEPARIN SODIUM 5000 [USP'U]/ML
5000 INJECTION, SOLUTION INTRAVENOUS; SUBCUTANEOUS EVERY 8 HOURS SCHEDULED
Status: COMPLETED | OUTPATIENT
Start: 2023-12-26 | End: 2023-12-26

## 2023-12-26 RX ADMIN — DONEPEZIL HYDROCHLORIDE 5 MG: 5 TABLET ORAL at 21:09

## 2023-12-26 RX ADMIN — ATORVASTATIN CALCIUM 40 MG: 40 TABLET, FILM COATED ORAL at 17:36

## 2023-12-26 RX ADMIN — SERTRALINE HYDROCHLORIDE 50 MG: 50 TABLET ORAL at 08:21

## 2023-12-26 RX ADMIN — CEPHALEXIN 500 MG: 500 CAPSULE ORAL at 17:36

## 2023-12-26 RX ADMIN — APIXABAN 10 MG: 5 TABLET, FILM COATED ORAL at 17:36

## 2023-12-26 RX ADMIN — FINASTERIDE 5 MG: 5 TABLET, FILM COATED ORAL at 08:21

## 2023-12-26 RX ADMIN — IOHEXOL 85 ML: 350 INJECTION, SOLUTION INTRAVENOUS at 13:49

## 2023-12-26 RX ADMIN — ASPIRIN 81 MG CHEWABLE TABLET 81 MG: 81 TABLET CHEWABLE at 08:21

## 2023-12-26 RX ADMIN — TAMSULOSIN HYDROCHLORIDE 0.4 MG: 0.4 CAPSULE ORAL at 17:36

## 2023-12-26 RX ADMIN — CEPHALEXIN 500 MG: 500 CAPSULE ORAL at 12:35

## 2023-12-26 RX ADMIN — CEPHALEXIN 500 MG: 500 CAPSULE ORAL at 23:18

## 2023-12-26 RX ADMIN — MELATONIN 3 MG: 3 TAB ORAL at 21:09

## 2023-12-26 RX ADMIN — CYANOCOBALAMIN 100 MCG: 1000 INJECTION, SOLUTION INTRAMUSCULAR; SUBCUTANEOUS at 08:24

## 2023-12-26 RX ADMIN — CLOPIDOGREL 75 MG: 75 TABLET ORAL at 08:21

## 2023-12-26 RX ADMIN — HEPARIN SODIUM 5000 UNITS: 5000 INJECTION INTRAVENOUS; SUBCUTANEOUS at 05:30

## 2023-12-26 RX ADMIN — HEPARIN SODIUM 5000 UNITS: 5000 INJECTION INTRAVENOUS; SUBCUTANEOUS at 14:26

## 2023-12-26 RX ADMIN — CEPHALEXIN 500 MG: 500 CAPSULE ORAL at 05:30

## 2023-12-26 NOTE — QUICK NOTE
"E-consult Follow up - Neurology   Satya Bailey Sr. 88 y.o. male MRN: [id number]  Unit/Bed#: -01 Encounter: [id number]      Assessment/Plan     No new Assessment & Plan notes have been filed under this hospital service since the last note was generated.  Service: Neurology            Satya Bailey Sr. will need follow-up within 8 weeks with either general neurology or neurovascular attending only.  He will not require repeat labs from a neurology standpoint    Updated/Reviewed Data:   Received a message from the inpatient team that Mr. Bailey has been diagnosed with a DVT (acute).    I reviewed the prior neurology consultation.  At this point his symptoms were reasonably focal and show concern for recent transient ischemic attack remains.  Bleeding risk on a combination of dual antiplatelet therapy and anticoagulation for DVT would be prohibitive.          Vitals: Blood pressure 140/67, pulse 64, temperature (!) 97.2 °F (36.2 °C), resp. rate 18, height 5' 10\" (1.778 m), weight 86.1 kg (189 lb 12.8 oz), SpO2 99%.,Body mass index is 27.23 kg/m².    Lab, Imaging and other studies: CBC:   Results from last 7 days   Lab Units 12/25/23  0522 12/24/23  0609 12/23/23  0539   WBC Thousand/uL 6.07 6.85 5.26   RBC Million/uL 4.75 4.43 4.17   HEMOGLOBIN g/dL 14.4 13.5 12.5   HEMATOCRIT % 42.9 39.3 37.3   MCV fL 90 89 89   PLATELETS Thousands/uL 195 204 179   , BMP/CMP:   Results from last 7 days   Lab Units 12/25/23  0522 12/24/23  0609 12/23/23  0539 12/22/23  1607   SODIUM mmol/L 141 139 141 140   POTASSIUM mmol/L 3.5 3.5 3.3* 3.6   CHLORIDE mmol/L 105 105 105 104   CO2 mmol/L 28 27 29 30   BUN mg/dL 14 14 14 18   CREATININE mg/dL 0.78 0.82 0.82 0.87   CALCIUM mg/dL 9.3 9.0 8.9 9.2   AST U/L  --   --   --  40*   ALT U/L  --   --   --  18   ALK PHOS U/L  --   --   --  48   EGFR ml/min/1.73sq m 80 78 78 77         Current Facility-Administered Medications:     apixaban (ELIQUIS) tablet 10 mg, 10 mg, Oral, BID **FOLLOWED BY** " [START ON 1/2/2024] apixaban (ELIQUIS) tablet 5 mg, 5 mg, Oral, BID, Serene Cisneros, PA-C    aspirin chewable tablet 81 mg, 81 mg, Oral, Daily, Lamar Wheatland, PA-C, 81 mg at 12/26/23 0821    atorvastatin (LIPITOR) tablet 40 mg, 40 mg, Oral, QPM, Lamar Wheatland, PA-C, 40 mg at 12/25/23 1738    cephalexin (KEFLEX) capsule 500 mg, 500 mg, Oral, Q6H BOBBY, Serene Lockettall, PA-C, 500 mg at 12/26/23 1235    cyanocobalamin injection 100 mcg, 100 mcg, Intramuscular, Daily, Serene Cisneros, PA-C, 100 mcg at 12/26/23 0824    donepezil (ARICEPT) tablet 5 mg, 5 mg, Oral, HS, Lamar Wheatland, PA-C, 5 mg at 12/25/23 2118    finasteride (PROSCAR) tablet 5 mg, 5 mg, Oral, Daily, Lamar Wheatland, PA-C, 5 mg at 12/26/23 0821    heparin (porcine) subcutaneous injection 5,000 Units, 5,000 Units, Subcutaneous, Q8H BOBBY, Serene Waldron Gnall, PA-C    melatonin tablet 3 mg, 3 mg, Oral, HS, Lamar Wheatland, PA-C, 3 mg at 12/25/23 2118    sertraline (ZOLOFT) tablet 50 mg, 50 mg, Oral, Daily, Lamar Wheatland, PA-C, 50 mg at 12/26/23 0821    tamsulosin (FLOMAX) capsule 0.4 mg, 0.4 mg, Oral, Daily With Dinner, Lamar Wheatland, PA-C, 0.4 mg at 12/25/23 1637

## 2023-12-26 NOTE — CASE MANAGEMENT
Case Management Discharge Planning Note    Patient name Satya Bailey .  Location /-01 MRN [id number]  : 1935 Date 2023       Current Admission Date: 2023  Current Admission Diagnosis:Stroke-like symptoms   Patient Active Problem List    Diagnosis Date Noted    Altered mental status 2023    Acute deep vein thrombosis (DVT) of right peroneal vein (HCC) 2023    Stroke-like symptoms 2023    Metabolic encephalopathy 2023    Bilateral lower extremity edema 2023    Elevated PSA 2023    History of bladder cancer 2023    Recurrent major depressive disorder, in partial remission (HCC) 2019    Mild dementia without behavioral disturbance, psychotic disturbance, mood disturbance, or anxiety (HCC) 2019    Severe obstructive sleep apnea       LOS (days): 4  Geometric Mean LOS (GMLOS) (days): 4.5  Days to GMLOS:0.5     OBJECTIVE:  Risk of Unplanned Readmission Score: 9.81         Current admission status: Inpatient   Preferred Pharmacy:   [facility] - [place] [address] [address] [address] [facility] [id number]  Phone: [phone] Fax: [phone]    Primary Care Provider: Ronny Evans MD    Primary Insurance: MEDICARE  Secondary Insurance: BLUE CROSS    DISCHARGE DETAILS:     The following STR have accepted pt and have LTC beds available if pt needs [facility] the [facility] Kong, who sts the [facility] pt at time of discharge.  Cm reserving bed.

## 2023-12-26 NOTE — PLAN OF CARE
Problem: PAIN - ADULT  Goal: Verbalizes/displays adequate comfort level or baseline comfort level  Description: Interventions:  - Encourage patient to monitor pain and request assistance  - Assess pain using appropriate pain scale  - Administer analgesics based on type and severity of pain and evaluate response  - Implement non-pharmacological measures as appropriate and evaluate response  - Consider cultural and social influences on pain and pain management  - Notify physician/advanced practitioner if interventions unsuccessful or patient reports new pain  Outcome: Progressing     Problem: INFECTION - ADULT  Goal: Absence or prevention of progression during hospitalization  Description: INTERVENTIONS:  - Assess and monitor for signs and symptoms of infection  - Monitor lab/diagnostic results  - Monitor all insertion sites, i.e. indwelling lines, tubes, and drains  - Monitor endotracheal if appropriate and nasal secretions for changes in amount and color  - [place] appropriate cooling/warming therapies per order  - Administer medications as ordered  - Instruct and encourage patient and family to use good hand hygiene technique  - Identify and instruct in appropriate isolation precautions for identified infection/condition  Outcome: Progressing     Problem: SAFETY ADULT  Goal: Patient will remain free of falls  Description: INTERVENTIONS:  - Educate patient/family on patient safety including physical limitations  - Instruct patient to call for assistance with activity   - Consult OT/PT to assist with strengthening/mobility   - Keep Call bell within reach  - Keep bed low and locked with side rails adjusted as appropriate  - Keep care items and personal belongings within reach  - Initiate and maintain comfort rounds  - Make Fall Risk Sign visible to staff  - Apply yellow socks and bracelet for high fall risk patients  - Consider moving patient to room near nurses station  Outcome: Progressing  Goal: Maintain or  return to baseline ADL function  Description: INTERVENTIONS:  -  Assess patient's ability to carry out ADLs; assess patient's baseline for ADL function and identify physical deficits which impact ability to perform ADLs (bathing, care of mouth/teeth, toileting, grooming, dressing, etc.)  - Assess/evaluate cause of self-care deficits   - Assess range of motion  - Assess patient's mobility; develop plan if impaired  - Assess patient's need for assistive devices and provide as appropriate  - Encourage maximum independence but intervene and supervise when necessary  - Involve family in performance of ADLs  - Assess for home care needs following discharge   - Consider OT consult to assist with ADL evaluation and planning for discharge  - Provide patient education as appropriate  Outcome: Progressing  Goal: Maintains/Returns to pre admission functional level  Description: INTERVENTIONS:  - Perform AM-PAC 6 Click Basic Mobility/ Daily Activity assessment daily.  - Set and communicate daily mobility goal to care team and patient/family/caregiver.   - Collaborate with rehabilitation services on mobility goals if consulted  - Out of bed for toileting  - Record patient progress and toleration of activity level   Outcome: Progressing     Problem: DISCHARGE PLANNING  Goal: Discharge to home or other facility with appropriate resources  Description: INTERVENTIONS:  - Identify barriers to discharge w/patient and caregiver  - Arrange for needed discharge resources and transportation as appropriate  - Identify discharge learning needs (meds, wound care, etc.)  - Arrange for interpretive services to assist at discharge as needed  - Refer to Case Management Department for coordinating discharge planning if the patient needs post-hospital services based on physician/advanced practitioner order or complex needs related to functional status, cognitive ability, or social support system  Outcome: Progressing     Problem: Knowledge  Deficit  Goal: Patient/family/caregiver demonstrates understanding of disease process, treatment plan, medications, and discharge instructions  Description: Complete learning assessment and assess knowledge base.  Interventions:  - Provide teaching at level of understanding  - Provide teaching via preferred learning methods  Outcome: Progressing     Problem: Neurological Deficit  Goal: Neurological status is stable or improving  Description: Interventions:  - Monitor and assess patient's level of consciousness, motor function, sensory function, and level of assistance needed for ADLs.   - Monitor and report changes from baseline. Collaborate with interdisciplinary team to initiate plan and implement interventions as ordered.   - Provide and maintain a safe environment.  - Consider seizure precautions.  - Consider fall precautions.  - Consider aspiration precautions.  - Consider bleeding precautions.  Outcome: Progressing     Problem: Activity Intolerance/Impaired Mobility  Goal: Mobility/activity is maintained at optimum level for patient  Description: Interventions:  - Assess and monitor patient  barriers to mobility and need for assistive/adaptive devices.  - Assess patient's emotional response to limitations.  - Collaborate with interdisciplinary team and initiate plans and interventions as ordered.  - Encourage independent activity per ability.  - Maintain proper body alignment.  - Perform active/passive rom as tolerated/ordered.  - Plan activities to conserve energy.  - Turn patient as appropriate  Outcome: Progressing     Problem: Communication Impairment  Goal: Ability to express needs and understand communication  Description: Assess patient's communication skills and ability to understand information.  Patient will demonstrate use of effective communication techniques, alternative methods of communication and understanding even if not able to speak.     - Encourage communication and provide alternate  methods of communication as needed.  - Collaborate with case management/ for discharge needs.  - Include patient/family/caregiver in decisions related to communication.  Outcome: Progressing     Problem: Potential for Aspiration  Goal: Non-ventilated patient's risk of aspiration is minimized  Description: Assess and monitor vital signs, respiratory status, and labs (WBC).  Monitor for signs of aspiration (tachypnea, cough, rales, wheezing, cyanosis, fever).    - Assess and monitor patient's ability to swallow.  - Place patient up in chair to eat if possible.  - HOB up at 90 degrees to eat if unable to get patient up into chair.  - Supervise patient during oral intake.   - Instruct patient/ family to take small bites.  - Instruct patient/ family to take small single sips when taking liquids.  - Follow patient-specific strategies generated by speech pathologist.  Outcome: Progressing     Problem: Nutrition  Goal: Nutrition/Hydration status is improving  Description: Monitor and assess patient's nutrition/hydration status for malnutrition (ex- brittle hair, bruises, dry skin, pale skin and conjunctiva, muscle wasting, smooth red tongue, and disorientation). Collaborate with interdisciplinary team and initiate plan and interventions as ordered.  Monitor patient's weight and dietary intake as ordered or per policy. Utilize nutrition screening tool and intervene per policy. Determine patient's food preferences and provide high-protein, high-caloric foods as appropriate.     - Assist patient with eating.  - Allow adequate time for meals.  - Encourage patient to take dietary supplement as ordered.  - Collaborate with clinical nutritionist.  - Include patient/family/caregiver in decisions related to nutrition.  Outcome: Progressing     Problem: Nutrition/Hydration-ADULT  Goal: Nutrient/Hydration intake appropriate for improving, restoring or maintaining nutritional needs  Description: Monitor and assess  patient's nutrition/hydration status for malnutrition. Collaborate with interdisciplinary team and initiate plan and interventions as ordered.  Monitor patient's weight and dietary intake as ordered or per policy. Utilize nutrition screening tool and intervene as necessary. Determine patient's food preferences and provide high-protein, high-caloric foods as appropriate.     INTERVENTIONS:  - Monitor oral intake, urinary output, labs, and treatment plans  - Assess nutrition and hydration status and recommend course of action  - Evaluate amount of meals eaten  - Assist patient with eating if necessary   - Allow adequate time for meals  - Recommend/ encourage appropriate diets, oral nutritional supplements, and vitamin/mineral supplements  - Order, calculate, and assess calorie counts as needed  - Recommend, monitor, and adjust tube feedings and TPN/PPN based on assessed needs  - Assess need for intravenous fluids  - Provide specific nutrition/hydration education as appropriate  - Include patient/family/caregiver in decisions related to nutrition  Outcome: Progressing     Problem: Prexisting or High Potential for Compromised Skin Integrity  Goal: Skin integrity is maintained or improved  Description: INTERVENTIONS:  - Identify patients at risk for skin breakdown  - Assess and monitor skin integrity  - Assess and monitor nutrition and hydration status  - Monitor labs   - Assess for incontinence   - Turn and reposition patient  - Assist with mobility/ambulation  - Relieve pressure over bony prominences  - Avoid friction and shearing  - Provide appropriate hygiene as needed including keeping skin clean and dry  - Evaluate need for skin moisturizer/barrier cream  - Collaborate with interdisciplinary team   - Patient/family teaching  - Consider wound care consult   Outcome: Progressing

## 2023-12-26 NOTE — PLAN OF CARE
Problem: PAIN - ADULT  Goal: Verbalizes/displays adequate comfort level or baseline comfort level  Description: Interventions:  - Encourage patient to monitor pain and request assistance  - Assess pain using appropriate pain scale  - Administer analgesics based on type and severity of pain and evaluate response  - Implement non-pharmacological measures as appropriate and evaluate response  - Consider cultural and social influences on pain and pain management  - Notify physician/advanced practitioner if interventions unsuccessful or patient reports new pain  Outcome: Progressing     Problem: INFECTION - ADULT  Goal: Absence or prevention of progression during hospitalization  Description: INTERVENTIONS:  - Assess and monitor for signs and symptoms of infection  - Monitor lab/diagnostic results  - Monitor all insertion sites, i.e. indwelling lines, tubes, and drains  - Monitor endotracheal if appropriate and nasal secretions for changes in amount and color  - [place] appropriate cooling/warming therapies per order  - Administer medications as ordered  - Instruct and encourage patient and family to use good hand hygiene technique  - Identify and instruct in appropriate isolation precautions for identified infection/condition  Outcome: Progressing     Problem: SAFETY ADULT  Goal: Patient will remain free of falls  Description: INTERVENTIONS:  - Educate patient/family on patient safety including physical limitations  - Instruct patient to call for assistance with activity   - Consult OT/PT to assist with strengthening/mobility   - Keep Call bell within reach  - Keep bed low and locked with side rails adjusted as appropriate  - Keep care items and personal belongings within reach  - Initiate and maintain comfort rounds  - Make Fall Risk Sign visible to staff  - Offer Toileting every 2 Hours, in advance of need  - Initiate/Maintain alarm  - Obtain necessary fall risk management equipment:  - Apply yellow socks and  bracelet for high fall risk patients  - Consider moving patient to room near nurses station  Outcome: Progressing  Goal: Maintain or return to baseline ADL function  Description: INTERVENTIONS:  -  Assess patient's ability to carry out ADLs; assess patient's baseline for ADL function and identify physical deficits which impact ability to perform ADLs (bathing, care of mouth/teeth, toileting, grooming, dressing, etc.)  - Assess/evaluate cause of self-care deficits   - Assess range of motion  - Assess patient's mobility; develop plan if impaired  - Assess patient's need for assistive devices and provide as appropriate  - Encourage maximum independence but intervene and supervise when necessary  - Involve family in performance of ADLs  - Assess for home care needs following discharge   - Consider OT consult to assist with ADL evaluation and planning for discharge  - Provide patient education as appropriate  Outcome: Progressing  Goal: Maintains/Returns to pre admission functional level  Description: INTERVENTIONS:  - Perform AM-PAC 6 Click Basic Mobility/ Daily Activity assessment daily.  - Set and communicate daily mobility goal to care team and patient/family/caregiver.   - Collaborate with rehabilitation services on mobility goals if consulted  - Perform Range of Motion 3 times a day.  - Reposition patient every 2 hours.  - Dangle patient 3 times a day  - Stand patient 3 times a day  - Ambulate patient 3 times a day  - Out of bed to chair 3 times a day   - Out of bed for meals 3 times a day  - Out of bed for toileting  - Record patient progress and toleration of activity level   Outcome: Progressing     Problem: DISCHARGE PLANNING  Goal: Discharge to home or other facility with appropriate resources  Description: INTERVENTIONS:  - Identify barriers to discharge w/patient and caregiver  - Arrange for needed discharge resources and transportation as appropriate  - Identify discharge learning needs (meds, wound care,  etc.)  - Arrange for interpretive services to assist at discharge as needed  - Refer to Case Management Department for coordinating discharge planning if the patient needs post-hospital services based on physician/advanced practitioner order or complex needs related to functional status, cognitive ability, or social support system  Outcome: Progressing     Problem: Knowledge Deficit  Goal: Patient/family/caregiver demonstrates understanding of disease process, treatment plan, medications, and discharge instructions  Description: Complete learning assessment and assess knowledge base.  Interventions:  - Provide teaching at level of understanding  - Provide teaching via preferred learning methods  Outcome: Progressing     Problem: Neurological Deficit  Goal: Neurological status is stable or improving  Description: Interventions:  - Monitor and assess patient's level of consciousness, motor function, sensory function, and level of assistance needed for ADLs.   - Monitor and report changes from baseline. Collaborate with interdisciplinary team to initiate plan and implement interventions as ordered.   - Provide and maintain a safe environment.  - Consider seizure precautions.  - Consider fall precautions.  - Consider aspiration precautions.  - Consider bleeding precautions.  Outcome: Progressing     Problem: Activity Intolerance/Impaired Mobility  Goal: Mobility/activity is maintained at optimum level for patient  Description: Interventions:  - Assess and monitor patient  barriers to mobility and need for assistive/adaptive devices.  - Assess patient's emotional response to limitations.  - Collaborate with interdisciplinary team and initiate plans and interventions as ordered.  - Encourage independent activity per ability.  - Maintain proper body alignment.  - Perform active/passive rom as tolerated/ordered.  - Plan activities to conserve energy.  - Turn patient as appropriate  Outcome: Progressing     Problem:  Communication Impairment  Goal: Ability to express needs and understand communication  Description: Assess patient's communication skills and ability to understand information.  Patient will demonstrate use of effective communication techniques, alternative methods of communication and understanding even if not able to speak.     - Encourage communication and provide alternate methods of communication as needed.  - Collaborate with case management/ for discharge needs.  - Include patient/family/caregiver in decisions related to communication.  Outcome: Progressing     Problem: Potential for Aspiration  Goal: Non-ventilated patient's risk of aspiration is minimized  Description: Assess and monitor vital signs, respiratory status, and labs (WBC).  Monitor for signs of aspiration (tachypnea, cough, rales, wheezing, cyanosis, fever).    - Assess and monitor patient's ability to swallow.  - Place patient up in chair to eat if possible.  - HOB up at 90 degrees to eat if unable to get patient up into chair.  - Supervise patient during oral intake.   - Instruct patient/ family to take small bites.  - Instruct patient/ family to take small single sips when taking liquids.  - Follow patient-specific strategies generated by speech pathologist.  Outcome: Progressing     Problem: Nutrition  Goal: Nutrition/Hydration status is improving  Description: Monitor and assess patient's nutrition/hydration status for malnutrition (ex- brittle hair, bruises, dry skin, pale skin and conjunctiva, muscle wasting, smooth red tongue, and disorientation). Collaborate with interdisciplinary team and initiate plan and interventions as ordered.  Monitor patient's weight and dietary intake as ordered or per policy. Utilize nutrition screening tool and intervene per policy. Determine patient's food preferences and provide high-protein, high-caloric foods as appropriate.     - Assist patient with eating.  - Allow adequate time for  meals.  - Encourage patient to take dietary supplement as ordered.  - Collaborate with clinical nutritionist.  - Include patient/family/caregiver in decisions related to nutrition.  Outcome: Progressing     Problem: Nutrition/Hydration-ADULT  Goal: Nutrient/Hydration intake appropriate for improving, restoring or maintaining nutritional needs  Description: Monitor and assess patient's nutrition/hydration status for malnutrition. Collaborate with interdisciplinary team and initiate plan and interventions as ordered.  Monitor patient's weight and dietary intake as ordered or per policy. Utilize nutrition screening tool and intervene as necessary. Determine patient's food preferences and provide high-protein, high-caloric foods as appropriate.     INTERVENTIONS:  - Monitor oral intake, urinary output, labs, and treatment plans  - Assess nutrition and hydration status and recommend course of action  - Evaluate amount of meals eaten  - Assist patient with eating if necessary   - Allow adequate time for meals  - Recommend/ encourage appropriate diets, oral nutritional supplements, and vitamin/mineral supplements  - Order, calculate, and assess calorie counts as needed  - Recommend, monitor, and adjust tube feedings and TPN/PPN based on assessed needs  - Assess need for intravenous fluids  - Provide specific nutrition/hydration education as appropriate  - Include patient/family/caregiver in decisions related to nutrition  Outcome: Progressing     Problem: Prexisting or High Potential for Compromised Skin Integrity  Goal: Skin integrity is maintained or improved  Description: INTERVENTIONS:  - Identify patients at risk for skin breakdown  - Assess and monitor skin integrity  - Assess and monitor nutrition and hydration status  - Monitor labs   - Assess for incontinence   - Turn and reposition patient  - Assist with mobility/ambulation  - Relieve pressure over bony prominences  - Avoid friction and shearing  - Provide  appropriate hygiene as needed including keeping skin clean and dry  - Evaluate need for skin moisturizer/barrier cream  - Collaborate with interdisciplinary team   - Patient/family teaching  - Consider wound care consult   Outcome: Progressing

## 2023-12-26 NOTE — ASSESSMENT & PLAN NOTE
Continue aricept  Currently at baseline-oriented to self and family.  Intermittently oriented to place  Has been cooperative and following instructions well.

## 2023-12-26 NOTE — WOUND OSTOMY CARE
Consult Note - Wound   Satya Bailey Sr. 88 y.o. male MRN: [id number]  Unit/Bed#: -01 Encounter: [id number]        History and Present Illness:  88 year old male admitted with stroke like symptoms.PMH mild dementia,bilateral lower extremity edema.    Assessment Findings:   1)Bilateral heels dry and intact hydraguard applied  2)POA pt has stage 2 of bilateral inner buttocks now resolving, skin slight macerated and pink all areas are blanchable. Calazime applied     No induration, fluctuance, odor, warmth/temperature differences, redness, or purulence noted to the above noted wounds and skin areas assessed. New dressings applied per orders listed below. Patient tolerated well- no s/s of non-verbal pain or discomfort observed during the encounter. Bedside nurse aware of plan of care. See flow sheets for more detailed assessment findings. Wound care will continue to follow.     Skin care plans:  1-Calazime to sacrum, buttocks TID and PRN.  2-Hydraguard to bilateral heel BID and PRN.  3-Elevate heels to offload pressure.  4-Ehob cushion when out of bed.  5-Turn/reposition q2h or when medically stable for pressure re-distribution on skin.  6-Moisturize skin daily with skin nourishing cream.    Wounds:  Wound 12/22/23 Pressure Injury Buttocks (Active)   Wound Image   12/26/23 0859   Wound Description Pink 12/26/23 0859   Meggan-wound Assessment Maceration 12/26/23 0859   Wound Length (cm) 4 cm 12/26/23 0859   Wound Width (cm) 4 cm 12/26/23 0859   Wound Surface Area (cm^2) 16 cm^2 12/26/23 0859   Drainage Amount None 12/26/23 0859   Treatments Cleansed;Site care 12/26/23 0859   Dressing Moisture barrier 12/26/23 0859   Dressing Changed Reinforced 12/26/23 0859   Patient Tolerance Tolerated well 12/26/23 0859   Dressing Status Remoistened 12/26/23 0859     Call or tigertext with any questions  Wound Care will continue to follow    Rachele ZAVALAN RN [facility]

## 2023-12-26 NOTE — ASSESSMENT & PLAN NOTE
Based on the prior neurology evaluation agree that transient ischemic attack is possible.    -Continue secondary stroke prevention including aspirin, blood pressure and glycemic control, and high-dose statin  -Would recommend discontinuing Plavix at this point in time in favor of aspirin monotherapy plus Eliquis for acute DVT  -We will plan for outpatient follow-up as described below

## 2023-12-26 NOTE — DISCHARGE INSTR - OTHER ORDERS
Skin care plans:  1-Calazime to sacrum, buttocks TID and PRN.  2-Hydraguard to bilateral heel BID and PRN.  3-Elevate heels to offload pressure.  4-Ehob cushion when out of bed.  5-Turn/reposition q2h or when medically stable for pressure re-distribution on skin.  6-Moisturize skin daily with skin nourishing cream.

## 2023-12-26 NOTE — ASSESSMENT & PLAN NOTE
POA - patient from nursing facility and noted to be more confused than normal with left sided facial droop and lean. No last known well  CT head/ CT head and neck without acute ischemia or stenosis  Permissive htn  TSH, folate, vitamin d normal  B12 low, start on b12 injections x 5 days and then oral b12 supplements  A1c 5.7%  Lipid panel WNL  PT/OT recommending rehab.   MRI brain: No acute infarction, intra hemorrhage or mass effect. trace, chronic microangiopathy   Neuro consulted -recommending aspirin, statin and stroke work up; suspect possibly TIA, load with plavix 300 mg and then do plavix 75 x 21 days tomorrow. Continue ASA. Follow up with echo and also eeg.   Symptoms remain improved, still with leaning, but no further left facial droop.   Echo:  Wall thickness is mildly increased. There is mild concentric hypertrophy. The left ventricular ejection fraction is 65%. Systolic function is normal. Wall motion is normal. Diastolic function is normal.   EEG: This is an abnormal 30 minutes awake and drowsy EEG due to slow posterior dominant rhythm and theta activity during wakefulness. These findings are etiologically nonspecific for mild diffuse cerebral dysfunction.  Discussed with neuro: eeg is nonspecific - does show cerebral dysfunction, but nothing that suggests high risk for seizure. No change based on the report but need to follow up in the office. Also due to new DVT, d/c plavix and continue with aspirin. Likely symptoms were from TIA.

## 2023-12-26 NOTE — PROGRESS NOTES
[facility]  Progress Note  Name: Satya Hadley I  MRN: [id number]  Unit/Bed#: [facility] I Date of Admission: 12/22/2023   Date of Service: 12/26/2023 I Hospital Day: 4    Assessment/Plan   * Stroke-like symptoms  Assessment & Plan  POA - patient from nursing facility and noted to be more confused than normal with left sided facial droop and lean. No last known well  CT head/ CT head and neck without acute ischemia or stenosis  Permissive htn  TSH, folate, vitamin d normal  B12 low, start on b12 injections x 5 days and then oral b12 supplements  A1c 5.7%  Lipid panel WNL  PT/OT recommending rehab.   MRI brain: No acute infarction, intra hemorrhage or mass effect. trace, chronic microangiopathy   Neuro consulted -recommending aspirin, statin and stroke work up; suspect possibly TIA, load with plavix 300 mg and then do plavix 75 x 21 days tomorrow. Continue ASA. Follow up with echo and also eeg.   Symptoms remain improved, still with leaning, but no further left facial droop.   Echo:  Wall thickness is mildly increased. There is mild concentric hypertrophy. The left ventricular ejection fraction is 65%. Systolic function is normal. Wall motion is normal. Diastolic function is normal.   EEG: This is an abnormal 30 minutes awake and drowsy EEG due to slow posterior dominant rhythm and theta activity during wakefulness. These findings are etiologically nonspecific for mild diffuse cerebral dysfunction.  Discussed with neuro: eeg is nonspecific - does show cerebral dysfunction, but nothing that suggests high risk for seizure. No change based on the report but need to follow up in the office. Also due to new DVT, d/c plavix and continue with aspirin. Likely symptoms were from TIA.     Metabolic encephalopathy  Assessment & Plan  UA and infectious workup pending at this time   Blood cultures no growth at 24 hours.    No overt signs of infection, concern for lower extremity cellulitis and on IV  ancef.   B12 low, started on b12 injections  Folate and vitamin d normal  Rest of plan under stroke like symptoms  Frequent reorientation and delirium precautions    Bilateral lower extremity edema  Assessment & Plan  Non pitting edema with erythema to bilateral shins  Will start on ancef for possible cellulitis, erythema improving, will de-escalate to keflex  CRP normal  MRSA normal    Acute deep vein thrombosis (DVT) of right peroneal vein (HCC)  Assessment & Plan  Patient with bilateral lower extremity non-pitting edema. Venous duplex ordered  Venous duplex showing acute occlusive right peroneal DVT  Will get CTA chest PE study to fully rule out PE due to new DVT.   Will start on eliquis 10 mg bid x 7 days then start on 5 mg bid after this.  Stopping plavix per neuro recs but continue ASA    Mild dementia without behavioral disturbance, psychotic disturbance, mood disturbance, or anxiety (HCC)  Assessment & Plan  Continue aricept  Currently at baseline-oriented to self and family.  Intermittently oriented to place  Has been cooperative and following instructions well.          VTE Pharmacologic Prophylaxis: VTE Score: 4 Moderate Risk (Score 3-4) - Pharmacological DVT Prophylaxis Ordered: heparin.    Mobility:   Basic Mobility Inpatient Raw Score: 20  JH-HLM Goal: 6: Walk 10 steps or more  JH-HLM Achieved: 7: Walk 25 feet or more  HLM Goal achieved. Continue to encourage appropriate mobility.    Patient Centered Rounds: I performed bedside rounds with nursing staff today.   Discussions with Specialists or Other Care Team Provider: nursing, case management, neurology    Education and Discussions with Family / Patient: Updated  (son) at bedside.    Total Time Spent on Date of Encounter in care of patient: 43 mins. This time was spent on one or more of the following: performing physical exam; counseling and coordination of care; obtaining or reviewing history; documenting in the medical record;  reviewing/ordering tests, medications or procedures; communicating with other healthcare professionals and discussing with patient's family/caregivers.    Current Length of Stay: 4 day(s)  Current Patient Status: Inpatient   Certification Statement: The patient will continue to require additional inpatient hospital stay due to pending EEG, starting on eliquis for DVT  Discharge Plan: Anticipate discharge in 24-48 hrs to rehab facility.    Code Status: Level 3 - DNAR and DNI    Subjective:   Patient seen and examined. He is doing well today. Offers no complaints. States that he slept well and is eating good. No events reported from nursing overnight.     Objective:     Vitals:   Temp (24hrs), Av.5 °F (36.4 °C), Min:97.2 °F (36.2 °C), Max:97.7 °F (36.5 °C)    Temp:  [97.2 °F (36.2 °C)-97.7 °F (36.5 °C)] 97.2 °F (36.2 °C)  HR:  [64-75] 64  Resp:  [17-18] 18  BP: (129-140)/(67-80) 140/67  SpO2:  [97 %-100 %] 99 %  Body mass index is 27.23 kg/m².     Input and Output Summary (last 24 hours):     Intake/Output Summary (Last 24 hours) at 2023 1348  Last data filed at 2023 0900  Gross per 24 hour   Intake 180 ml   Output 300 ml   Net -120 ml       Physical Exam:   Physical Exam  Vitals reviewed.   Constitutional:       General: He is not in acute distress.     Appearance: He is not ill-appearing or toxic-appearing.   HENT:      Head: Normocephalic and atraumatic.      Mouth/Throat:      Mouth: Mucous membranes are moist.   Cardiovascular:      Rate and Rhythm: Normal rate and regular rhythm.      Heart sounds: No murmur heard.  Pulmonary:      Effort: No respiratory distress.      Breath sounds: No stridor. No wheezing.      Comments: Saturating well on room air  Abdominal:      General: Bowel sounds are normal. There is no distension.      Palpations: Abdomen is soft. There is no mass.      Tenderness: There is no abdominal tenderness.   Musculoskeletal:         General: No tenderness.      Right lower leg:  Edema present.      Left lower leg: Edema present.      Comments: Bilateral nonpitting edema   Skin:     General: Skin is warm and dry.      Findings: No erythema.   Neurological:      Mental Status: He is alert. Mental status is at baseline.      Comments: Oriented to self and intermittently location and month - not oriented to year   Psychiatric:         Mood and Affect: Mood normal.         Behavior: Behavior normal.          Additional Data:     Labs:  Results from last 7 days   Lab Units 12/25/23  0522   WBC Thousand/uL 6.07   HEMOGLOBIN g/dL 14.4   HEMATOCRIT % 42.9   PLATELETS Thousands/uL 195     Results from last 7 days   Lab Units 12/25/23  0522 12/23/23  0539 12/22/23  1607   SODIUM mmol/L 141   < > 140   POTASSIUM mmol/L 3.5   < > 3.6   CHLORIDE mmol/L 105   < > 104   CO2 mmol/L 28   < > 30   BUN mg/dL 14   < > 18   CREATININE mg/dL 0.78   < > 0.87   ANION GAP mmol/L 8   < > 6   CALCIUM mg/dL 9.3   < > 9.2   ALBUMIN g/dL  --   --  4.1   TOTAL BILIRUBIN mg/dL  --   --  0.91   ALK PHOS U/L  --   --  48   ALT U/L  --   --  18   AST U/L  --   --  40*   GLUCOSE RANDOM mg/dL 90   < > 118    < > = values in this interval not displayed.     Results from last 7 days   Lab Units 12/22/23  1607   INR  1.01     Results from last 7 days   Lab Units 12/22/23  1556   POC GLUCOSE mg/dl 124     Results from last 7 days   Lab Units 12/23/23  0539   HEMOGLOBIN A1C % 5.7*           Lines/Drains:  Invasive Devices       Peripheral Intravenous Line  Duration             Peripheral IV 12/23/23 Distal;Left;Ventral (anterior) Forearm 2 days                          Imaging: Reviewed radiology reports from this admission including: ultrasound(s)    Recent Cultures (last 7 days):   Results from last 7 days   Lab Units 12/22/23  1650   BLOOD CULTURE  No Growth at 72 hrs.  No Growth at 72 hrs.       Last 24 Hours Medication List:   Current Facility-Administered Medications   Medication Dose Route Frequency Provider Last Rate     apixaban  10 mg Oral BID Serene Cisneros PA-C      Followed by    [START ON 1/2/2024] apixaban  5 mg Oral BID Serene Cisneros PA-C      aspirin  81 mg Oral Daily Lamar Sn[place]om[place], IVON      atorvastatin  40 mg Oral QPM Lamar [place], PA-CEFERINO      cephalexin  500 mg Oral Q6H BOBBY Serene Cisneros, IVON      cyanocobalamin  100 mcg Intramuscular Daily Serene Cisneros, IVON      donepezil  5 mg Oral HS Lamar [place], IVON      finasteride  5 mg Oral Daily Lamar [place], IVON      heparin (porcine)  5,000 Units Subcutaneous Q8H BOBBY Serene Cisneros, IVON      melatonin  3 mg Oral HS Lamar [place], PA-CEFERINO      sertraline  50 mg Oral Daily Lamar [place], IVON      tamsulosin  0.4 mg Oral Daily With Dinner Lamar Praveena, IVON          Today, Patient Was Seen By: Serene Cisneros PA-C    **Please Note: This note may have been constructed using a voice recognition system.**

## 2023-12-26 NOTE — PROGRESS NOTES
Patient:    MRN:  [id number]    Aidin Request ID:  [id number]    Level of care reserved:  Skilled Nursing Facility    Partner Reserved:  [place] [address] [phone]    Clinical needs requested:    Geography searched:  10 miles around [id number]    Start of Service:    Request sent:  4:34pm EST on 12/23/2023 by Yanique Austin    Partner reserved:  4:15pm EST on 12/26/2023 by Yanique Austin    Choice list shared:

## 2023-12-26 NOTE — ASSESSMENT & PLAN NOTE
Patient with bilateral lower extremity non-pitting edema. Venous duplex ordered  Venous duplex showing acute occlusive right peroneal DVT  Will get CTA chest PE study to fully rule out PE due to new DVT.   Will start on eliquis 10 mg bid x 7 days then start on 5 mg bid after this.  Stopping plavix per neuro recs but continue ASA

## 2023-12-27 VITALS
DIASTOLIC BLOOD PRESSURE: 65 MMHG | TEMPERATURE: 97.7 F | BODY MASS INDEX: 27.17 KG/M2 | RESPIRATION RATE: 18 BRPM | HEART RATE: 72 BPM | OXYGEN SATURATION: 97 % | WEIGHT: 189.8 LBS | HEIGHT: 70 IN | SYSTOLIC BLOOD PRESSURE: 132 MMHG

## 2023-12-27 LAB
ANION GAP SERPL CALCULATED.3IONS-SCNC: 6 MMOL/L
BACTERIA BLD CULT: NORMAL
BACTERIA BLD CULT: NORMAL
BUN SERPL-MCNC: 14 MG/DL (ref 5–25)
CALCIUM SERPL-MCNC: 9.2 MG/DL (ref 8.4–10.2)
CHLORIDE SERPL-SCNC: 104 MMOL/L (ref 96–108)
CO2 SERPL-SCNC: 30 MMOL/L (ref 21–32)
CREAT SERPL-MCNC: 0.72 MG/DL (ref 0.6–1.3)
ERYTHROCYTE [DISTWIDTH] IN BLOOD BY AUTOMATED COUNT: 12.8 % (ref 11.6–15.1)
GFR SERPL CREATININE-BSD FRML MDRD: 83 ML/MIN/1.73SQ M
GLUCOSE SERPL-MCNC: 96 MG/DL (ref 65–140)
HCT VFR BLD AUTO: 40.4 % (ref 36.5–49.3)
HGB BLD-MCNC: 13.6 G/DL (ref 12–17)
MCH RBC QN AUTO: 30 PG (ref 26.8–34.3)
MCHC RBC AUTO-ENTMCNC: 33.7 G/DL (ref 31.4–37.4)
MCV RBC AUTO: 89 FL (ref 82–98)
PLATELET # BLD AUTO: 195 THOUSANDS/UL (ref 149–390)
PMV BLD AUTO: 9.4 FL (ref 8.9–12.7)
POTASSIUM SERPL-SCNC: 3.3 MMOL/L (ref 3.5–5.3)
RBC # BLD AUTO: 4.53 MILLION/UL (ref 3.88–5.62)
SODIUM SERPL-SCNC: 140 MMOL/L (ref 135–147)
WBC # BLD AUTO: 5.66 THOUSAND/UL (ref 4.31–10.16)

## 2023-12-27 PROCEDURE — 85027 COMPLETE CBC AUTOMATED: CPT

## 2023-12-27 PROCEDURE — 80048 BASIC METABOLIC PNL TOTAL CA: CPT

## 2023-12-27 PROCEDURE — 99239 HOSP IP/OBS DSCHRG MGMT >30: CPT

## 2023-12-27 RX ORDER — CEPHALEXIN 500 MG/1
500 CAPSULE ORAL EVERY 6 HOURS SCHEDULED
Qty: 12 CAPSULE | Refills: 0 | Status: SHIPPED
Start: 2023-12-27 | End: 2023-12-30

## 2023-12-27 RX ORDER — ATORVASTATIN CALCIUM 40 MG/1
40 TABLET, FILM COATED ORAL EVERY EVENING
Qty: 30 TABLET | Refills: 0 | Status: SHIPPED
Start: 2023-12-27

## 2023-12-27 RX ORDER — POTASSIUM CHLORIDE 20 MEQ/1
20 TABLET, EXTENDED RELEASE ORAL ONCE
Status: COMPLETED | OUTPATIENT
Start: 2023-12-27 | End: 2023-12-27

## 2023-12-27 RX ORDER — SERTRALINE HYDROCHLORIDE 25 MG/1
50 TABLET, FILM COATED ORAL DAILY
Status: SHIPPED
Start: 2023-12-27 | End: 2024-06-24

## 2023-12-27 RX ORDER — CYANOCOBALAMIN (VITAMIN B-12) 500 MCG
500 TABLET ORAL DAILY
Qty: 30 TABLET | Refills: 0 | Status: SHIPPED
Start: 2023-12-27

## 2023-12-27 RX ORDER — ASPIRIN 81 MG/1
81 TABLET, CHEWABLE ORAL DAILY
Qty: 30 TABLET | Refills: 0 | Status: SHIPPED
Start: 2023-12-28

## 2023-12-27 RX ADMIN — APIXABAN 10 MG: 5 TABLET, FILM COATED ORAL at 08:29

## 2023-12-27 RX ADMIN — TAMSULOSIN HYDROCHLORIDE 0.4 MG: 0.4 CAPSULE ORAL at 16:54

## 2023-12-27 RX ADMIN — CEPHALEXIN 500 MG: 500 CAPSULE ORAL at 11:04

## 2023-12-27 RX ADMIN — ATORVASTATIN CALCIUM 40 MG: 40 TABLET, FILM COATED ORAL at 16:54

## 2023-12-27 RX ADMIN — SERTRALINE HYDROCHLORIDE 50 MG: 50 TABLET ORAL at 08:29

## 2023-12-27 RX ADMIN — CEPHALEXIN 500 MG: 500 CAPSULE ORAL at 16:54

## 2023-12-27 RX ADMIN — APIXABAN 10 MG: 5 TABLET, FILM COATED ORAL at 16:54

## 2023-12-27 RX ADMIN — ASPIRIN 81 MG CHEWABLE TABLET 81 MG: 81 TABLET CHEWABLE at 08:29

## 2023-12-27 RX ADMIN — FINASTERIDE 5 MG: 5 TABLET, FILM COATED ORAL at 08:29

## 2023-12-27 RX ADMIN — POTASSIUM CHLORIDE 20 MEQ: 1500 TABLET, EXTENDED RELEASE ORAL at 09:35

## 2023-12-27 RX ADMIN — CYANOCOBALAMIN 100 MCG: 1000 INJECTION, SOLUTION INTRAMUSCULAR; SUBCUTANEOUS at 08:29

## 2023-12-27 RX ADMIN — CEPHALEXIN 500 MG: 500 CAPSULE ORAL at 05:05

## 2023-12-27 NOTE — ASSESSMENT & PLAN NOTE
POA - patient from nursing facility and noted to be more confused than normal with left sided facial droop and lean. No last known well  CT head/ CT head and neck without acute ischemia or stenosis  Permissive htn  TSH, folate, vitamin d normal  B12 low, start on b12 injections x 5 days and then oral b12 supplements  A1c 5.7%  Lipid panel WNL  PT/OT recommending rehab.   MRI brain: No acute infarction, intra hemorrhage or mass effect. trace, chronic microangiopathy   Neuro consulted -recommending aspirin, statin and stroke work up; suspect possibly TIA, load with plavix 300 mg and then do plavix 75 x 21 days tomorrow. Continue ASA. Follow up with echo and also eeg.   Symptoms remain improved, still with leaning, but no further left facial droop.   Echo:  Wall thickness is mildly increased. There is mild concentric hypertrophy. The left ventricular ejection fraction is 65%. Systolic function is normal. Wall motion is normal. Diastolic function is normal.   EEG: This is an abnormal 30 minutes awake and drowsy EEG due to slow posterior dominant rhythm and theta activity during wakefulness. These findings are etiologically nonspecific for mild diffuse cerebral dysfunction.  Discussed with neuro: eeg is nonspecific - does show cerebral dysfunction, but nothing that suggests high risk for seizure. No change based on the report but need to follow up in the office. Also due to new DVT, d/c plavix and continue with aspirin. Likely symptoms were from TIA.   Remained the same, no further cognitive deficits. Stable for discharge to rehab. Going to [facility] for rehab.  Follow up with neurology in 8 weeks.

## 2023-12-27 NOTE — ASSESSMENT & PLAN NOTE
UA and infectious workup pending at this time   Blood cultures no growth after 4 days   No overt signs of infection, concern for lower extremity cellulitis and on IV ancef.   B12 low, started on b12 injections - transition to oral meds on discharge. Repeat b12 level in 1 month  Folate and vitamin d normal  Rest of plan under stroke like symptoms  Frequent reorientation and delirium precautions

## 2023-12-27 NOTE — PLAN OF CARE
Problem: PAIN - ADULT  Goal: Verbalizes/displays adequate comfort level or baseline comfort level  Description: Interventions:  - Encourage patient to monitor pain and request assistance  - Assess pain using appropriate pain scale  - Administer analgesics based on type and severity of pain and evaluate response  - Implement non-pharmacological measures as appropriate and evaluate response  - Consider cultural and social influences on pain and pain management  - Notify physician/advanced practitioner if interventions unsuccessful or patient reports new pain  12/27/2023 1713 by Romy Ferreira RN  Outcome: Adequate for Discharge  12/27/2023 0730 by Romy Ferreira RN  Outcome: Progressing     Problem: INFECTION - ADULT  Goal: Absence or prevention of progression during hospitalization  Description: INTERVENTIONS:  - Assess and monitor for signs and symptoms of infection  - Monitor lab/diagnostic results  - Monitor all insertion sites, i.e. indwelling lines, tubes, and drains  - Monitor endotracheal if appropriate and nasal secretions for changes in amount and color  - Adams appropriate cooling/warming therapies per order  - Administer medications as ordered  - Instruct and encourage patient and family to use good hand hygiene technique  - Identify and instruct in appropriate isolation precautions for identified infection/condition  12/27/2023 1713 by Romy Ferreira RN  Outcome: Adequate for Discharge  12/27/2023 0730 by Romy Ferreira RN  Outcome: Progressing     Problem: SAFETY ADULT  Goal: Patient will remain free of falls  Description: INTERVENTIONS:  - Educate patient/family on patient safety including physical limitations  - Instruct patient to call for assistance with activity   - Consult OT/PT to assist with strengthening/mobility   - Keep Call bell within reach  - Keep bed low and locked with side rails adjusted as appropriate  - Keep care items and personal belongings within reach  - Initiate and maintain  Stable. comfort rounds  - Make Fall Risk Sign visible to staff  - Apply yellow socks and bracelet for high fall risk patients  - Consider moving patient to room near nurses station  12/27/2023 1713 by Romy Ferreira RN  Outcome: Adequate for Discharge  12/27/2023 0730 by Romy Ferreira RN  Outcome: Progressing  Goal: Maintain or return to baseline ADL function  Description: INTERVENTIONS:  -  Assess patient's ability to carry out ADLs; assess patient's baseline for ADL function and identify physical deficits which impact ability to perform ADLs (bathing, care of mouth/teeth, toileting, grooming, dressing, etc.)  - Assess/evaluate cause of self-care deficits   - Assess range of motion  - Assess patient's mobility; develop plan if impaired  - Assess patient's need for assistive devices and provide as appropriate  - Encourage maximum independence but intervene and supervise when necessary  - Involve family in performance of ADLs  - Assess for home care needs following discharge   - Consider OT consult to assist with ADL evaluation and planning for discharge  - Provide patient education as appropriate  12/27/2023 1713 by Romy Ferreira RN  Outcome: Adequate for Discharge  12/27/2023 0730 by Romy Ferreira RN  Outcome: Progressing  Goal: Maintains/Returns to pre admission functional level  Description: INTERVENTIONS:  - Perform AM-PAC 6 Click Basic Mobility/ Daily Activity assessment daily.  - Set and communicate daily mobility goal to care team and patient/family/caregiver.   - Collaborate with rehabilitation services on mobility goals if consulted  - Out of bed for toileting  - Record patient progress and toleration of activity level   12/27/2023 1713 by Romy Ferreira RN  Outcome: Adequate for Discharge  12/27/2023 0730 by Romy Ferreira RN  Outcome: Progressing     Problem: DISCHARGE PLANNING  Goal: Discharge to home or other facility with appropriate resources  Description: INTERVENTIONS:  - Identify barriers to discharge  w/patient and caregiver  - Arrange for needed discharge resources and transportation as appropriate  - Identify discharge learning needs (meds, wound care, etc.)  - Arrange for interpretive services to assist at discharge as needed  - Refer to Case Management Department for coordinating discharge planning if the patient needs post-hospital services based on physician/advanced practitioner order or complex needs related to functional status, cognitive ability, or social support system  12/27/2023 1713 by Romy Ferreira RN  Outcome: Adequate for Discharge  12/27/2023 0730 by Romy Ferreira RN  Outcome: Progressing     Problem: Knowledge Deficit  Goal: Patient/family/caregiver demonstrates understanding of disease process, treatment plan, medications, and discharge instructions  Description: Complete learning assessment and assess knowledge base.  Interventions:  - Provide teaching at level of understanding  - Provide teaching via preferred learning methods  12/27/2023 1713 by Romy Ferreira RN  Outcome: Adequate for Discharge  12/27/2023 0730 by Romy Ferreira RN  Outcome: Progressing     Problem: Neurological Deficit  Goal: Neurological status is stable or improving  Description: Interventions:  - Monitor and assess patient's level of consciousness, motor function, sensory function, and level of assistance needed for ADLs.   - Monitor and report changes from baseline. Collaborate with interdisciplinary team to initiate plan and implement interventions as ordered.   - Provide and maintain a safe environment.  - Consider seizure precautions.  - Consider fall precautions.  - Consider aspiration precautions.  - Consider bleeding precautions.  12/27/2023 1713 by Romy Ferreira RN  Outcome: Adequate for Discharge  12/27/2023 0730 by Romy Ferreira RN  Outcome: Progressing     Problem: Activity Intolerance/Impaired Mobility  Goal: Mobility/activity is maintained at optimum level for patient  Description: Interventions:  -  Assess and monitor patient  barriers to mobility and need for assistive/adaptive devices.  - Assess patient's emotional response to limitations.  - Collaborate with interdisciplinary team and initiate plans and interventions as ordered.  - Encourage independent activity per ability.  - Maintain proper body alignment.  - Perform active/passive rom as tolerated/ordered.  - Plan activities to conserve energy.  - Turn patient as appropriate  12/27/2023 1713 by Romy Ferreira RN  Outcome: Adequate for Discharge  12/27/2023 0730 by Romy Ferreira RN  Outcome: Progressing     Problem: Communication Impairment  Goal: Ability to express needs and understand communication  Description: Assess patient's communication skills and ability to understand information.  Patient will demonstrate use of effective communication techniques, alternative methods of communication and understanding even if not able to speak.     - Encourage communication and provide alternate methods of communication as needed.  - Collaborate with case management/ for discharge needs.  - Include patient/family/caregiver in decisions related to communication.  12/27/2023 1713 by Romy Ferreira RN  Outcome: Adequate for Discharge  12/27/2023 0730 by Romy Ferreira RN  Outcome: Progressing     Problem: Potential for Aspiration  Goal: Non-ventilated patient's risk of aspiration is minimized  Description: Assess and monitor vital signs, respiratory status, and labs (WBC).  Monitor for signs of aspiration (tachypnea, cough, rales, wheezing, cyanosis, fever).    - Assess and monitor patient's ability to swallow.  - Place patient up in chair to eat if possible.  - HOB up at 90 degrees to eat if unable to get patient up into chair.  - Supervise patient during oral intake.   - Instruct patient/ family to take small bites.  - Instruct patient/ family to take small single sips when taking liquids.  - Follow patient-specific strategies generated by speech  pathologist.  12/27/2023 1713 by Romy Ferreira RN  Outcome: Adequate for Discharge  12/27/2023 0730 by Romy Ferreira RN  Outcome: Progressing     Problem: Nutrition  Goal: Nutrition/Hydration status is improving  Description: Monitor and assess patient's nutrition/hydration status for malnutrition (ex- brittle hair, bruises, dry skin, pale skin and conjunctiva, muscle wasting, smooth red tongue, and disorientation). Collaborate with interdisciplinary team and initiate plan and interventions as ordered.  Monitor patient's weight and dietary intake as ordered or per policy. Utilize nutrition screening tool and intervene per policy. Determine patient's food preferences and provide high-protein, high-caloric foods as appropriate.     - Assist patient with eating.  - Allow adequate time for meals.  - Encourage patient to take dietary supplement as ordered.  - Collaborate with clinical nutritionist.  - Include patient/family/caregiver in decisions related to nutrition.  12/27/2023 1713 by Romy Ferreira RN  Outcome: Adequate for Discharge  12/27/2023 0730 by Romy Ferreira RN  Outcome: Progressing     Problem: Nutrition/Hydration-ADULT  Goal: Nutrient/Hydration intake appropriate for improving, restoring or maintaining nutritional needs  Description: Monitor and assess patient's nutrition/hydration status for malnutrition. Collaborate with interdisciplinary team and initiate plan and interventions as ordered.  Monitor patient's weight and dietary intake as ordered or per policy. Utilize nutrition screening tool and intervene as necessary. Determine patient's food preferences and provide high-protein, high-caloric foods as appropriate.     INTERVENTIONS:  - Monitor oral intake, urinary output, labs, and treatment plans  - Assess nutrition and hydration status and recommend course of action  - Evaluate amount of meals eaten  - Assist patient with eating if necessary   - Allow adequate time for meals  - Recommend/  encourage appropriate diets, oral nutritional supplements, and vitamin/mineral supplements  - Order, calculate, and assess calorie counts as needed  - Recommend, monitor, and adjust tube feedings and TPN/PPN based on assessed needs  - Assess need for intravenous fluids  - Provide specific nutrition/hydration education as appropriate  - Include patient/family/caregiver in decisions related to nutrition  12/27/2023 1713 by Romy Ferreira RN  Outcome: Adequate for Discharge  12/27/2023 0730 by Romy Ferreira RN  Outcome: Progressing     Problem: Prexisting or High Potential for Compromised Skin Integrity  Goal: Skin integrity is maintained or improved  Description: INTERVENTIONS:  - Identify patients at risk for skin breakdown  - Assess and monitor skin integrity  - Assess and monitor nutrition and hydration status  - Monitor labs   - Assess for incontinence   - Turn and reposition patient  - Assist with mobility/ambulation  - Relieve pressure over bony prominences  - Avoid friction and shearing  - Provide appropriate hygiene as needed including keeping skin clean and dry  - Evaluate need for skin moisturizer/barrier cream  - Collaborate with interdisciplinary team   - Patient/family teaching  - Consider wound care consult   12/27/2023 1713 by Romy Ferreira RN  Outcome: Adequate for Discharge  12/27/2023 0730 by Romy Ferreira RN  Outcome: Progressing

## 2023-12-27 NOTE — DISCHARGE INSTR - AVS FIRST PAGE
Dear Satya Bailey Sr.,     It was our pleasure to care for you here at [facility]. For follow up as well as any medication refills, we recommend that you follow up with your primary care physician. Here are the most important instructions/ recommendations at discharge:     Notable Medication Adjustments -   Start taking eliquis 10 mg twice daily for 6 days, then start taking eliquis 5 mg twice daily for 3-6 months. Discuss with PCP if they would like you on Eliquis for 3 or 6 months  Start taking aspirin 81 mg daily  Start taking b12 500 mg daily  Start taking keflex 500 mg every 6 hours for 3 days  Testing Required after Discharge -   Repeat B12 level in 1 month  Important follow up information -   Follow up with PCP in 1 week  Follow up with neurology in 8 weeks  Other Instructions -   Please continue taking medications as prescribed. Please continue follow up with outpatient providers. Return to the ED if you have any new or worsening symptoms.   Please review this entire after visit summary as additional general instructions including medication list, appointments, activity, diet, any pertinent wound care, and other additional recommendations from your care team that may be provided for you.      Sincerely,     Serene Cisneros PA-C

## 2023-12-27 NOTE — CASE MANAGEMENT
Case Management Discharge Planning Note    Patient name Satya Bailey .  Location /-01 MRN [id number]  : 1935 Date 2023       Current Admission Date: 2023  Current Admission Diagnosis:Stroke-like symptoms   Patient Active Problem List    Diagnosis Date Noted    Altered mental status 2023    Acute deep vein thrombosis (DVT) of right peroneal vein (HCC) 2023    Stroke-like symptoms 2023    Metabolic encephalopathy 2023    Bilateral lower extremity edema 2023    Elevated PSA 2023    History of bladder cancer 2023    Recurrent major depressive disorder, in partial remission (HCC) 2019    Mild dementia without behavioral disturbance, psychotic disturbance, mood disturbance, or anxiety (HCC) 2019    Severe obstructive sleep apnea       LOS (days): 5  Geometric Mean LOS (GMLOS) (days): 2.1  Days to GMLOS:-2.9     OBJECTIVE:  Risk of Unplanned Readmission Score: 9.85         Current admission status: Inpatient   Preferred Pharmacy:   [facility] - [place] [address] [facility] [address]  Phone: [phone] Fax: [phone]    Primary Care Provider: Ronny Evans MD    Primary Insurance: MEDICARE  Secondary Insurance: BLUE CROSS    DISCHARGE DETAILS:       Requested Home Health Care         Is the patient interested in HHC at discharge?: No    DME Referral Provided  Referral made for DME?: No    Other Referral/Resources/Interventions Provided:  Interventions: SNF  Referral Comments: [facility]    Would you like to participate in our [facility] service program?  : No - Declined    Treatment Team Recommendation: SNF  Discharge Destination Plan:: SNF  Transport at Discharge : Wheelchair van     Number/Name of Dispatcher: [employer]     ETA of Transport (Date): 23  ETA of Transport (Time): 1730      [facility] accepted        IMM Given (Date):: 23  IMM Given to:: Patient           Accepting Facility Name, City & State : [facility]  Receiving Facility/Agency Phone Number: [phone]  Facility/Agency Fax Number: [phone]       CM reached out to [facility] to confirm they can receive patient today - they have bed available. Requested updated information on patient and PASSR - CM completed and attached requested information. CM called facility to confirm they are expecting patient and timeframe - they are ready whenever.    IMM reviewed with patient, patient agrees with discharge determination. PT signed IMM. CM discussed [employer] fee for transportation - he asked CM to speak with family to confirm.    CM followed up with patient's son, he is also in agreement with discharge.Discussed with patient that transportation via [employer] is not covered by insurance and patient will be billed for this service. Verbalized understanding.  He requested [employer] transportation.     CM requested transportation through roundtrip for 530pm by [employer]. Accepted my [facility] ambulance    CM updated Anneliese on discharge time - will stop at the hospital and then follow patient to [facility].     Cm to follow for patient's care and discharge needs.

## 2023-12-27 NOTE — PLAN OF CARE
Problem: PAIN - ADULT  Goal: Verbalizes/displays adequate comfort level or baseline comfort level  Description: Interventions:  - Encourage patient to monitor pain and request assistance  - Assess pain using appropriate pain scale  - Administer analgesics based on type and severity of pain and evaluate response  - Implement non-pharmacological measures as appropriate and evaluate response  - Consider cultural and social influences on pain and pain management  - Notify physician/advanced practitioner if interventions unsuccessful or patient reports new pain  Outcome: Progressing     Problem: INFECTION - ADULT  Goal: Absence or prevention of progression during hospitalization  Description: INTERVENTIONS:  - Assess and monitor for signs and symptoms of infection  - Monitor lab/diagnostic results  - Monitor all insertion sites, i.e. indwelling lines, tubes, and drains  - Monitor endotracheal if appropriate and nasal secretions for changes in amount and color  - [place] appropriate cooling/warming therapies per order  - Administer medications as ordered  - Instruct and encourage patient and family to use good hand hygiene technique  - Identify and instruct in appropriate isolation precautions for identified infection/condition  Outcome: Progressing     Problem: SAFETY ADULT  Goal: Patient will remain free of falls  Description: INTERVENTIONS:  - Educate patient/family on patient safety including physical limitations  - Instruct patient to call for assistance with activity   - Consult OT/PT to assist with strengthening/mobility   - Keep Call bell within reach  - Keep bed low and locked with side rails adjusted as appropriate  - Keep care items and personal belongings within reach  - Initiate and maintain comfort rounds  - Make Fall Risk Sign visible to staff  - Offer Toileting every 2 Hours, in advance of need  - Initiate/Maintain alarm  - Obtain necessary fall risk management equipment  - Apply yellow socks and  bracelet for high fall risk patients  - Consider moving patient to room near nurses station  Outcome: Progressing  Goal: Maintain or return to baseline ADL function  Description: INTERVENTIONS:  -  Assess patient's ability to carry out ADLs; assess patient's baseline for ADL function and identify physical deficits which impact ability to perform ADLs (bathing, care of mouth/teeth, toileting, grooming, dressing, etc.)  - Assess/evaluate cause of self-care deficits   - Assess range of motion  - Assess patient's mobility; develop plan if impaired  - Assess patient's need for assistive devices and provide as appropriate  - Encourage maximum independence but intervene and supervise when necessary  - Involve family in performance of ADLs  - Assess for home care needs following discharge   - Consider OT consult to assist with ADL evaluation and planning for discharge  - Provide patient education as appropriate  Outcome: Progressing  Goal: Maintains/Returns to pre admission functional level  Description: INTERVENTIONS:  - Perform AM-PAC 6 Click Basic Mobility/ Daily Activity assessment daily.  - Set and communicate daily mobility goal to care team and patient/family/caregiver.   - Collaborate with rehabilitation services on mobility goals if consulted  - Perform Range of Motion 3 times a day.  - Reposition patient every 2 hours.  - Dangle patient 3 times a day  - Stand patient 3 times a day  - Ambulate patient 3 times a day  - Out of bed to chair 3 times a day   - Out of bed for meals 3 times a day  - Out of bed for toileting  - Record patient progress and toleration of activity level   Outcome: Progressing     Problem: DISCHARGE PLANNING  Goal: Discharge to home or other facility with appropriate resources  Description: INTERVENTIONS:  - Identify barriers to discharge w/patient and caregiver  - Arrange for needed discharge resources and transportation as appropriate  - Identify discharge learning needs (meds, wound care,  etc.)  - Arrange for interpretive services to assist at discharge as needed  - Refer to Case Management Department for coordinating discharge planning if the patient needs post-hospital services based on physician/advanced practitioner order or complex needs related to functional status, cognitive ability, or social support system  Outcome: Progressing     Problem: Knowledge Deficit  Goal: Patient/family/caregiver demonstrates understanding of disease process, treatment plan, medications, and discharge instructions  Description: Complete learning assessment and assess knowledge base.  Interventions:  - Provide teaching at level of understanding  - Provide teaching via preferred learning methods  Outcome: Progressing     Problem: Neurological Deficit  Goal: Neurological status is stable or improving  Description: Interventions:  - Monitor and assess patient's level of consciousness, motor function, sensory function, and level of assistance needed for ADLs.   - Monitor and report changes from baseline. Collaborate with interdisciplinary team to initiate plan and implement interventions as ordered.   - Provide and maintain a safe environment.  - Consider seizure precautions.  - Consider fall precautions.  - Consider aspiration precautions.  - Consider bleeding precautions.  Outcome: Progressing     Problem: Activity Intolerance/Impaired Mobility  Goal: Mobility/activity is maintained at optimum level for patient  Description: Interventions:  - Assess and monitor patient  barriers to mobility and need for assistive/adaptive devices.  - Assess patient's emotional response to limitations.  - Collaborate with interdisciplinary team and initiate plans and interventions as ordered.  - Encourage independent activity per ability.  - Maintain proper body alignment.  - Perform active/passive rom as tolerated/ordered.  - Plan activities to conserve energy.  - Turn patient as appropriate  Outcome: Progressing     Problem:  Communication Impairment  Goal: Ability to express needs and understand communication  Description: Assess patient's communication skills and ability to understand information.  Patient will demonstrate use of effective communication techniques, alternative methods of communication and understanding even if not able to speak.     - Encourage communication and provide alternate methods of communication as needed.  - Collaborate with case management/ for discharge needs.  - Include patient/family/caregiver in decisions related to communication.  Outcome: Progressing     Problem: Potential for Aspiration  Goal: Non-ventilated patient's risk of aspiration is minimized  Description: Assess and monitor vital signs, respiratory status, and labs (WBC).  Monitor for signs of aspiration (tachypnea, cough, rales, wheezing, cyanosis, fever).    - Assess and monitor patient's ability to swallow.  - Place patient up in chair to eat if possible.  - HOB up at 90 degrees to eat if unable to get patient up into chair.  - Supervise patient during oral intake.   - Instruct patient/ family to take small bites.  - Instruct patient/ family to take small single sips when taking liquids.  - Follow patient-specific strategies generated by speech pathologist.  Outcome: Progressing     Problem: Nutrition  Goal: Nutrition/Hydration status is improving  Description: Monitor and assess patient's nutrition/hydration status for malnutrition (ex- brittle hair, bruises, dry skin, pale skin and conjunctiva, muscle wasting, smooth red tongue, and disorientation). Collaborate with interdisciplinary team and initiate plan and interventions as ordered.  Monitor patient's weight and dietary intake as ordered or per policy. Utilize nutrition screening tool and intervene per policy. Determine patient's food preferences and provide high-protein, high-caloric foods as appropriate.     - Assist patient with eating.  - Allow adequate time for  meals.  - Encourage patient to take dietary supplement as ordered.  - Collaborate with clinical nutritionist.  - Include patient/family/caregiver in decisions related to nutrition.  Outcome: Progressing     Problem: Nutrition/Hydration-ADULT  Goal: Nutrient/Hydration intake appropriate for improving, restoring or maintaining nutritional needs  Description: Monitor and assess patient's nutrition/hydration status for malnutrition. Collaborate with interdisciplinary team and initiate plan and interventions as ordered.  Monitor patient's weight and dietary intake as ordered or per policy. Utilize nutrition screening tool and intervene as necessary. Determine patient's food preferences and provide high-protein, high-caloric foods as appropriate.     INTERVENTIONS:  - Monitor oral intake, urinary output, labs, and treatment plans  - Assess nutrition and hydration status and recommend course of action  - Evaluate amount of meals eaten  - Assist patient with eating if necessary   - Allow adequate time for meals  - Recommend/ encourage appropriate diets, oral nutritional supplements, and vitamin/mineral supplements  - Order, calculate, and assess calorie counts as needed  - Recommend, monitor, and adjust tube feedings and TPN/PPN based on assessed needs  - Assess need for intravenous fluids  - Provide specific nutrition/hydration education as appropriate  - Include patient/family/caregiver in decisions related to nutrition  Outcome: Progressing     Problem: Prexisting or High Potential for Compromised Skin Integrity  Goal: Skin integrity is maintained or improved  Description: INTERVENTIONS:  - Identify patients at risk for skin breakdown  - Assess and monitor skin integrity  - Assess and monitor nutrition and hydration status  - Monitor labs   - Assess for incontinence   - Turn and reposition patient  - Assist with mobility/ambulation  - Relieve pressure over bony prominences  - Avoid friction and shearing  - Provide  appropriate hygiene as needed including keeping skin clean and dry  - Evaluate need for skin moisturizer/barrier cream  - Collaborate with interdisciplinary team   - Patient/family teaching  - Consider wound care consult   Outcome: Progressing

## 2023-12-27 NOTE — DISCHARGE SUMMARY
[facility]  Discharge- Satya Bailey Sr. 1935, 88 y.o. male MRN: [id number]  Unit/Bed#: [facility] Encounter: [id number]  Primary Care Provider: Ronny Evans MD   Date and time admitted to hospital: 12/22/2023  3:53 PM    * Stroke-like symptoms  Assessment & Plan  POA - patient from nursing facility and noted to be more confused than normal with left sided facial droop and lean. No last known well  CT head/ CT head and neck without acute ischemia or stenosis  Permissive htn  TSH, folate, vitamin d normal  B12 low, start on b12 injections x 5 days and then oral b12 supplements  A1c 5.7%  Lipid panel WNL  PT/OT recommending rehab.   MRI brain: No acute infarction, intra hemorrhage or mass effect. trace, chronic microangiopathy   Neuro consulted -recommending aspirin, statin and stroke work up; suspect possibly TIA, load with plavix 300 mg and then do plavix 75 x 21 days tomorrow. Continue ASA. Follow up with echo and also eeg.   Symptoms remain improved, still with leaning, but no further left facial droop.   Echo:  Wall thickness is mildly increased. There is mild concentric hypertrophy. The left ventricular ejection fraction is 65%. Systolic function is normal. Wall motion is normal. Diastolic function is normal.   EEG: This is an abnormal 30 minutes awake and drowsy EEG due to slow posterior dominant rhythm and theta activity during wakefulness. These findings are etiologically nonspecific for mild diffuse cerebral dysfunction.  Discussed with neuro: eeg is nonspecific - does show cerebral dysfunction, but nothing that suggests high risk for seizure. No change based on the report but need to follow up in the office. Also due to new DVT, d/c plavix and continue with aspirin. Likely symptoms were from TIA.   Remained the same, no further cognitive deficits. Stable for discharge to rehab. Going to [facility] for rehab.  Follow up with neurology in 8 weeks.      Metabolic  encephalopathy  Assessment & Plan  UA and infectious workup pending at this time   Blood cultures no growth after 4 days   No overt signs of infection, concern for lower extremity cellulitis and on IV ancef.   B12 low, started on b12 injections - transition to oral meds on discharge. Repeat b12 level in 1 month  Folate and vitamin d normal  Rest of plan under stroke like symptoms  Frequent reorientation and delirium precautions    Bilateral lower extremity edema  Assessment & Plan  Non pitting edema with erythema to bilateral shins  Will start on ancef for possible cellulitis, erythema improving, will de-escalate to keflex  CRP normal  MRSA normal    Acute deep vein thrombosis (DVT) of right peroneal vein (HCC)  Assessment & Plan  Patient with bilateral lower extremity non-pitting edema. Venous duplex ordered  Venous duplex showing acute occlusive right peroneal DVT  CTA chest: Single small partially recanalizing subacute clot in the right interlobar pulmonary artery. Elevation of the RV/LV diameter ratio at 1.2. It is uncertain if this is due to the recent clot or pre-existing, given the small clot burden. 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  Will start on eliquis 10 mg bid x 7 days then start on 5 mg bid after this.  Stopping plavix per neuro recs but continue ASA    Mild dementia without behavioral disturbance, psychotic disturbance, mood disturbance, or anxiety (HCC)  Assessment & Plan  Continue aricept  Currently at baseline-oriented to self and family.  Intermittently oriented to place  Has been cooperative and following instructions well.       Medical Problems       Resolved Problems  Date Reviewed: 12/27/2023   None       Discharging Physician / Practitioner: Serene Cisneros PA-C  PCP: Ronny Evans MD  Admission Date:   Admission Orders (From admission, onward)       Ordered        12/22/23 1644  INPATIENT ADMISSION  Once                           Discharge Date: 12/27/23    Consultations During Hospital Stay:  Neurology, PT OT    Procedures Performed:   EEG: Background Activity: The background is grossly symmetric with respect to voltages and activities. During wakefulness, the background is fairly organized with anterior very low amplitude alpha-theta activity and posterior low amplitude theta activity.  There is a symmetric 5.5-6 Hz posterior dominant rhythm.  Drowsiness is characterized by roving eye movements, attenuation of the posterior dominant rhythm, central theta activity, and intermittent diffuse low-moderate to moderate amplitude 0.5-1.5 Hz delta activity. Activation Procedures:  Hyperventilation was not performed. Stepped photic stimulation from 1 to 30 fps was performed and produced no abnormality. Other findings:The single lead ECG shows a regular, sinus rhythm, and widened QRS complex. Interpretation:  This is an abnormal 30 minutes awake and drowsy EEG due to slow posterior dominant rhythm and theta activity during wakefulness. These findings are etiologically nonspecific for mild diffuse cerebral dysfunction.    Significant Findings / Test Results:   EEG results above  Echo: Left Ventricle: Left ventricular cavity size is normal. Wall thickness is mildly increased. There is mild concentric hypertrophy. The left ventricular ejection fraction is 65%. Systolic function is normal. Wall motion is normal. Diastolic function is normal. Right Ventricle: Right ventricular cavity size is normal. Systolic function is normal. Left Atrium: The atrium is normal in size (16-34 mL/m2).  Right Atrium: The atrium is normal in size. Mitral Valve: There is systolic anterior motion without late peaking gradient. Aorta: The aortic root is mildly dilated (4.4 cm).  CTA chest: PULMONARY ARTERIES: Small partially recanalizing clot in the right interlobar pulmonary artery (2/142). LUNGS: Compromised by motion with no acute disease. Benign linear atelectasis or  scar in the right lower lobe. 2 stable small right lower lobe nodules since November 2016, benign (3/132, 159). AIRWAYS: No significant filling defects. PLEURA: Calcified and noncalcified pleural plaques. HEART/GREAT VESSELS: Mild cardiomegaly. Elevation of the RV/LV diameter ratio 1.2. MEDIASTINUM AND TEA:  Unremarkable. CHEST WALL AND LOWER NECK: Moderate gynecomastia, greater on the left. UPPER ABDOMEN: Gas-filled colon. Stable 1.2 cm left adrenal adenoma. OSSEOUS STRUCTURES: Mild degenerative disease in the spine. IMPRESSION: Single small partially recanalizing subacute clot in the right interlobar pulmonary artery. Elevation of the RV/LV diameter ratio at 1.2. It is uncertain if this is due to the recent clot or pre-existing, given the small clot burden. 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  VAS venous duplex bilateral lower extremities: RIGHT LOWER LIMB: Evidence of acute occlusive deep vein thrombosis noted in one of the paired peroneal veins. No evidence of superficial thrombophlebitis noted. Doppler evaluation shows a normal response to augmentation maneuvers. Popliteal, posterior tibial and anterior tibial arterial Doppler waveforms are triphasic. LEFT LOWER LIMB: No evidence of acute or chronic deep vein thrombosis. No evidence of superficial thrombophlebitis noted. Doppler evaluation shows a normal response to augmentation maneuvers. Popliteal, posterior tibial and anterior tibial arterial Doppler waveforms are triphasic.  MRI brain: BRAIN PARENCHYMA:  There is no discrete mass, mass effect or midline shift. There is no intracranial hemorrhage.  There is no evidence of acute infarction and diffusion imaging is unremarkable. Small scattered hyperintensities on T2/FLAIR imaging are  noted in the periventricular and subcortical white matter demonstrating an appearance that is statistically most likely to represent very mild microangiopathic change.  VENTRICLES:  Normal for the patient's age. SELLA AND PITUITARY GLAND:  Normal. ORBITS:  Normal. PARANASAL SINUSES:  Normal. VASCULATURE:  Evaluation of the major intracranial vasculature demonstrates appropriate flow voids. CALVARIUM AND SKULL BASE:  Normal. EXTRACRANIAL SOFT TISSUES:  Normal. IMPRESSION: 1. No acute infarction, intra hemorrhage or mass effect. 2. Trace, chronic microangiopathy.  XR chest: no acute cardiopulmonary disease  CTA stroke head/neck: CERVICAL VASCULATURE AORTIC ARCH AND GREAT VESSELS: Three-vessel configuration of the aortic arch. No stenosis in the subclavian arteries. RIGHT VERTEBRAL ARTERY CERVICAL SEGMENT: Mild narrowing at the C3-4 level secondary to mass effect from adjacent osteophytes. LEFT VERTEBRAL ARTERY CERVICAL SEGMENT: Mild narrowing at the C3 level secondary to mass effect from adjacent osteophytes. RIGHT EXTRACRANIAL CAROTID SEGMENT:  Normal caliber common carotid artery. Minimal calcified plaque at the bifurcation and internal carotid artery origin without stenosis. LEFT EXTRACRANIAL CAROTID SEGMENT:  Normal caliber common carotid artery. Minimal calcified plaque at the bifurcation and internal carotid artery origin without stenosis. NASCET criteria was used to determine the degree of internal carotid artery diameter stenosis.  INTRACRANIAL VASCULATURE INTERNAL CAROTID ARTERIES: Minimal calcified plaque in the carotid siphons without hemodynamically significant stenosis. ANTERIOR CIRCULATION:  Symmetric A1 segments and anterior cerebral arteries with normal enhancement.  Normal anterior communicating artery. MIDDLE CEREBRAL ARTERY CIRCULATION:  M1 segment and middle cerebral artery branches demonstrate normal enhancement bilaterally. DISTAL VERTEBRAL ARTERIES: Calcified plaque in the proximal bilateral V4 segments results in mild stenosis. Posterior inferior cerebellar arteries are patent. BASILAR ARTERY:  Basilar artery is normal in caliber. Patent superior cerebellar  arteries. POSTERIOR CEREBRAL ARTERIES: No stenosis in the posterior cerebral arteries. Posterior communicating arteries not visualized. VENOUS STRUCTURES: Patent dural venous sinuses. NON VASCULAR ANATOMY BONY STRUCTURES: Large bridging anterior disc osteophytes in the lower cervical and upper thoracic region. SOFT TISSUES OF THE NECK: No mass or lymphadenopathy. THORACIC INLET: Clear lung apices. IMPRESSION: No hemodynamically significant stenosis, dissection or occlusion of the carotid or vertebral arteries or major vessels of the [place].  CT brain: PARENCHYMA: Periventricular and subcortical hypoattenuating foci consistent with microangiopathic disease. No evidence of acute intracranial hemorrhage or mass effect. VENTRICLES AND EXTRA-AXIAL SPACES: No hydrocephalus or extra-axial collection. VISUALIZED ORBITS: Intact globes and orbits. PARANASAL SINUSES: Clear. CALVARIUM AND EXTRACRANIAL SOFT TISSUES:   No acute calvarial fracture. IMPRESSION: No acute intracranial abnormality.  CRP normal  TSH normal  Vitamin d normal  Folate normal  B12: 150 low  A1c: 5.7  Lipid panel: cholesterol: 118, triglycerides 51, HDL: 52, LDL: 56  MRSA negative  UA: trace ketones  Troponins normal  Blood cultures no growth at 4 days  COVID/Flu/RSV negative    Incidental Findings:   none    Test Results Pending at Discharge (will require follow up):   none     Outpatient Tests Requested:  Follow up with neurology in 8 weeks  Follow up with PCP in 1 week    Complications:  none    Reason for Admission: stroke like symptoms, bilateral lower extremity edema, metabolic encephalopathy    Hospital Course:   Satya Bailey Sr. is a 88 y.o. male patient who originally presented to the hospital on 12/22/2023 due to worsening confusion, left sided facial droop and lean. In ED, stroke alert was called and neurology consulted. CT brain and CTA head/neck with results above. Neuro recommended ASA, statin, and stroke workup. MRI brain with  results above. Echo with results above. No stroke and symptoms improved during stay. Infectious workup negative but had concerns for possible cellulitis due to erythema to bilateral shins. Started on ancef which was changed to keflex. Erythema resolved. Noted to have low b12 and started on b12 injections with transition to daily b12 supplements. PT/OT consulted and recommended II (Moderate Resource Intensity). Speech saw patient and recommended soft/level 3 diet  due to patient dentures being unavailable. Symptoms improved, neuro concerned for TIA. Recommended c/w ASA 81 mg, but load plavix 300 mg qd and then 75 mg daily after for 21 days, then stay on ASA 81 indefinitely. Recommended EEG due to unsure if seizure. EEG results above. Due to new DVT found - discussed with neuro on call, recommended d/c plavix and continue ASA while starting eliquis. Will need follow up with neuro in 8 weeks.     Also had bilateral lower extremity swelling. Venous duplex ordered with results above. Positive for occlusive DVT. CTA chest ordered due to positive DVT and showed subacute PE with recanalization. Also with RV/LV ratio at 1.2 - uncertain if due to clot as it was small clot burden or chronic. Echo results above - no evidence of heart strain. Started on Eliquis 10 mg bid x 7 days and then start on eliquis 5 mg bid. Plavix discontinued, but continue aspirin 81 mg qd per neuro recs.     Should continue b12 supplements on discharge with repeat b12 level in 1 month. Continue with eliquis, should discuss with PCP if AC for 3 or 6 months. Follow up with PCP in 1 week. Follow up with neurology in 8 weeks. Continue with ASA 81 mg daily. Instructed to return if any new or worsening symptoms. CM assisted with discharge planning, patient was from [facility], but family concerned due to worsening cognitive function that he would be unable to return there. Going to [facility] for STR as they have LTC beds available if patient needs in the future  "as well. Family agreeable to the plan. Discharge to [facility].     Please see above list of diagnoses and related plan for additional information.     Condition at Discharge: fair    Discharge Day Visit / Exam:   Subjective:  Patient seen and examined today. He is doing well today. Offers no acute complaints currently. No events overnight per nurses.   Vitals: Blood Pressure: 132/65 (12/27/23 1540)  Pulse: 72 (12/27/23 1540)  Temperature: 97.7 °F (36.5 °C) (12/27/23 1540)  Temp Source: Temporal (12/26/23 2117)  Respirations: 18 (12/27/23 1540)  Height: 5' 10\" (177.8 cm) (12/24/23 1024)  Weight - Scale: 86.1 kg (189 lb 12.8 oz) (12/26/23 0531)  SpO2: 97 % (12/27/23 1540)  Exam:   Physical Exam  Vitals reviewed.   Constitutional:       General: He is not in acute distress.     Appearance: He is not ill-appearing or toxic-appearing.   HENT:      Head: Normocephalic and atraumatic.      Mouth/Throat:      Mouth: Mucous membranes are moist.   Cardiovascular:      Rate and Rhythm: Normal rate and regular rhythm.      Heart sounds: No murmur heard.  Pulmonary:      Effort: No respiratory distress.      Breath sounds: No stridor. No wheezing.      Comments: Saturating well on room air  Abdominal:      General: Bowel sounds are normal. There is no distension.      Palpations: Abdomen is soft. There is no mass.      Tenderness: There is no abdominal tenderness.   Musculoskeletal:      Right lower leg: Edema present.      Left lower leg: Edema present.      Comments: Bilateral nonpitting edema   Skin:     General: Skin is warm and dry.      Findings: No erythema.   Neurological:      Mental Status: He is alert. Mental status is at baseline.      Comments: Oriented to self, knew candy was recent after telling patient it was December, unsure year   Psychiatric:         Mood and Affect: Mood normal.         Behavior: Behavior normal.          Discussion with Family: Updated  (son) via phone.    Discharge " instructions/Information to patient and family:   See after visit summary for information provided to patient and family.      Provisions for Follow-Up Care:  See after visit summary for information related to follow-up care and any pertinent home health orders.      Mobility at time of Discharge:   Basic Mobility Inpatient Raw Score: 17  JH-HLM Goal: 5: Stand one or more mins  JH-HLM Achieved: 6: Walk 10 steps or more  HLM Goal achieved. Continue to encourage appropriate mobility.     Disposition:   Acute Rehab at [facility]    Planned Readmission: no     Discharge Statement:  I spent 52 minutes discharging the patient. This time was spent on the day of discharge. I had direct contact with the patient on the day of discharge. Greater than 50% of the total time was spent examining patient, answering all patient questions, arranging and discussing plan of care with patient as well as directly providing post-discharge instructions.  Additional time then spent on discharge activities.    Discharge Medications:  See after visit summary for reconciled discharge medications provided to patient and/or family.      **Please Note: This note may have been constructed using a voice recognition system**

## 2023-12-27 NOTE — PLAN OF CARE
Problem: PAIN - ADULT  Goal: Verbalizes/displays adequate comfort level or baseline comfort level  Description: Interventions:  - Encourage patient to monitor pain and request assistance  - Assess pain using appropriate pain scale  - Administer analgesics based on type and severity of pain and evaluate response  - Implement non-pharmacological measures as appropriate and evaluate response  - Consider cultural and social influences on pain and pain management  - Notify physician/advanced practitioner if interventions unsuccessful or patient reports new pain  Outcome: Progressing     Problem: INFECTION - ADULT  Goal: Absence or prevention of progression during hospitalization  Description: INTERVENTIONS:  - Assess and monitor for signs and symptoms of infection  - Monitor lab/diagnostic results  - Monitor all insertion sites, i.e. indwelling lines, tubes, and drains  - Monitor endotracheal if appropriate and nasal secretions for changes in amount and color  - [place] appropriate cooling/warming therapies per order  - Administer medications as ordered  - Instruct and encourage patient and family to use good hand hygiene technique  - Identify and instruct in appropriate isolation precautions for identified infection/condition  Outcome: Progressing     Problem: SAFETY ADULT  Goal: Patient will remain free of falls  Description: INTERVENTIONS:  - Educate patient/family on patient safety including physical limitations  - Instruct patient to call for assistance with activity   - Consult OT/PT to assist with strengthening/mobility   - Keep Call bell within reach  - Keep bed low and locked with side rails adjusted as appropriate  - Keep care items and personal belongings within reach  - Initiate and maintain comfort rounds  - Make Fall Risk Sign visible to staff  - Offer Toileting every 2 Hours, in advance of need  - Initiate/Maintain bed/chair alarm  - Obtain necessary fall risk management equipment: alarms  - Apply  yellow socks and bracelet for high fall risk patients  - Consider moving patient to room near nurses station  Outcome: Progressing  Goal: Maintain or return to baseline ADL function  Description: INTERVENTIONS:  -  Assess patient's ability to carry out ADLs; assess patient's baseline for ADL function and identify physical deficits which impact ability to perform ADLs (bathing, care of mouth/teeth, toileting, grooming, dressing, etc.)  - Assess/evaluate cause of self-care deficits   - Assess range of motion  - Assess patient's mobility; develop plan if impaired  - Assess patient's need for assistive devices and provide as appropriate  - Encourage maximum independence but intervene and supervise when necessary  - Involve family in performance of ADLs  - Assess for home care needs following discharge   - Consider OT consult to assist with ADL evaluation and planning for discharge  - Provide patient education as appropriate  Outcome: Progressing  Goal: Maintains/Returns to pre admission functional level  Description: INTERVENTIONS:  - Perform AM-PAC 6 Click Basic Mobility/ Daily Activity assessment daily.  - Set and communicate daily mobility goal to care team and patient/family/caregiver.   - Collaborate with rehabilitation services on mobility goals if consulted  - Perform Range of Motion TID times a day.  - Reposition patient every 2 hours.  - Dangle patient TID times a day  - Stand patient TID times a day  - Ambulate patient TID times a day  - Out of bed to chair TID times a day   - Out of bed for meals TID times a day  - Out of bed for toileting  - Record patient progress and toleration of activity level   Outcome: Progressing     Problem: DISCHARGE PLANNING  Goal: Discharge to home or other facility with appropriate resources  Description: INTERVENTIONS:  - Identify barriers to discharge w/patient and caregiver  - Arrange for needed discharge resources and transportation as appropriate  - Identify discharge  learning needs (meds, wound care, etc.)  - Arrange for interpretive services to assist at discharge as needed  - Refer to Case Management Department for coordinating discharge planning if the patient needs post-hospital services based on physician/advanced practitioner order or complex needs related to functional status, cognitive ability, or social support system  Outcome: Progressing     Problem: Knowledge Deficit  Goal: Patient/family/caregiver demonstrates understanding of disease process, treatment plan, medications, and discharge instructions  Description: Complete learning assessment and assess knowledge base.  Interventions:  - Provide teaching at level of understanding  - Provide teaching via preferred learning methods  Outcome: Progressing     Problem: Neurological Deficit  Goal: Neurological status is stable or improving  Description: Interventions:  - Monitor and assess patient's level of consciousness, motor function, sensory function, and level of assistance needed for ADLs.   - Monitor and report changes from baseline. Collaborate with interdisciplinary team to initiate plan and implement interventions as ordered.   - Provide and maintain a safe environment.  - Consider seizure precautions.  - Consider fall precautions.  - Consider aspiration precautions.  - Consider bleeding precautions.  Outcome: Progressing     Problem: Activity Intolerance/Impaired Mobility  Goal: Mobility/activity is maintained at optimum level for patient  Description: Interventions:  - Assess and monitor patient  barriers to mobility and need for assistive/adaptive devices.  - Assess patient's emotional response to limitations.  - Collaborate with interdisciplinary team and initiate plans and interventions as ordered.  - Encourage independent activity per ability.  - Maintain proper body alignment.  - Perform active/passive rom as tolerated/ordered.  - Plan activities to conserve energy.  - Turn patient as appropriate  Outcome:  Progressing     Problem: Communication Impairment  Goal: Ability to express needs and understand communication  Description: Assess patient's communication skills and ability to understand information.  Patient will demonstrate use of effective communication techniques, alternative methods of communication and understanding even if not able to speak.     - Encourage communication and provide alternate methods of communication as needed.  - Collaborate with case management/ for discharge needs.  - Include patient/family/caregiver in decisions related to communication.  Outcome: Progressing     Problem: Potential for Aspiration  Goal: Non-ventilated patient's risk of aspiration is minimized  Description: Assess and monitor vital signs, respiratory status, and labs (WBC).  Monitor for signs of aspiration (tachypnea, cough, rales, wheezing, cyanosis, fever).    - Assess and monitor patient's ability to swallow.  - Place patient up in chair to eat if possible.  - HOB up at 90 degrees to eat if unable to get patient up into chair.  - Supervise patient during oral intake.   - Instruct patient/ family to take small bites.  - Instruct patient/ family to take small single sips when taking liquids.  - Follow patient-specific strategies generated by speech pathologist.  Outcome: Progressing     Problem: Nutrition  Goal: Nutrition/Hydration status is improving  Description: Monitor and assess patient's nutrition/hydration status for malnutrition (ex- brittle hair, bruises, dry skin, pale skin and conjunctiva, muscle wasting, smooth red tongue, and disorientation). Collaborate with interdisciplinary team and initiate plan and interventions as ordered.  Monitor patient's weight and dietary intake as ordered or per policy. Utilize nutrition screening tool and intervene per policy. Determine patient's food preferences and provide high-protein, high-caloric foods as appropriate.     - Assist patient with eating.  -  Allow adequate time for meals.  - Encourage patient to take dietary supplement as ordered.  - Collaborate with clinical nutritionist.  - Include patient/family/caregiver in decisions related to nutrition.  Outcome: Progressing     Problem: Nutrition/Hydration-ADULT  Goal: Nutrient/Hydration intake appropriate for improving, restoring or maintaining nutritional needs  Description: Monitor and assess patient's nutrition/hydration status for malnutrition. Collaborate with interdisciplinary team and initiate plan and interventions as ordered.  Monitor patient's weight and dietary intake as ordered or per policy. Utilize nutrition screening tool and intervene as necessary. Determine patient's food preferences and provide high-protein, high-caloric foods as appropriate.     INTERVENTIONS:  - Monitor oral intake, urinary output, labs, and treatment plans  - Assess nutrition and hydration status and recommend course of action  - Evaluate amount of meals eaten  - Assist patient with eating if necessary   - Allow adequate time for meals  - Recommend/ encourage appropriate diets, oral nutritional supplements, and vitamin/mineral supplements  - Order, calculate, and assess calorie counts as needed  - Recommend, monitor, and adjust tube feedings and TPN/PPN based on assessed needs  - Assess need for intravenous fluids  - Provide specific nutrition/hydration education as appropriate  - Include patient/family/caregiver in decisions related to nutrition  Outcome: Progressing     Problem: Prexisting or High Potential for Compromised Skin Integrity  Goal: Skin integrity is maintained or improved  Description: INTERVENTIONS:  - Identify patients at risk for skin breakdown  - Assess and monitor skin integrity  - Assess and monitor nutrition and hydration status  - Monitor labs   - Assess for incontinence   - Turn and reposition patient  - Assist with mobility/ambulation  - Relieve pressure over bony prominences  - Avoid friction and  shearing  - Provide appropriate hygiene as needed including keeping skin clean and dry  - Evaluate need for skin moisturizer/barrier cream  - Collaborate with interdisciplinary team   - Patient/family teaching  - Consider wound care consult   Outcome: Progressing

## 2023-12-27 NOTE — ASSESSMENT & PLAN NOTE
Patient with bilateral lower extremity non-pitting edema. Venous duplex ordered  Venous duplex showing acute occlusive right peroneal DVT  CTA chest: Single small partially recanalizing subacute clot in the right interlobar pulmonary artery. Elevation of the RV/LV diameter ratio at 1.2. It is uncertain if this is due to the recent clot or pre-existing, given the small clot burden. 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  Will start on eliquis 10 mg bid x 7 days then start on 5 mg bid after this.  Stopping plavix per neuro recs but continue ASA

## 2023-12-28 ENCOUNTER — TELEPHONE (OUTPATIENT)
Dept: NEUROLOGY | Facility: CLINIC | Age: 88
End: 2023-12-28

## 2023-12-28 NOTE — TELEPHONE ENCOUNTER
[facility]U/12/22/2023 - 12/27/2023 (5 days)  [facility] / Stroke-like symptoms     Dc to Acute Rehab at [facility]         Appt on 02/16/24 at 130pm smita/Brian. Location address provided.          Per Notes:  Date of Service: 12/23/2023  2:19 PM      Audra Lincoln DO  Physician  Neurology    Pt can follow up with OP general neurology attending physician within 8 weeks of discharge

## 2024-02-05 ENCOUNTER — TELEPHONE (OUTPATIENT)
Dept: NEUROLOGY | Facility: CLINIC | Age: 89
End: 2024-02-05

## 2024-09-08 NOTE — PATIENT INSTRUCTIONS
Your extended care facility will repeat your PSA  I will see you back in 3 months    Continue your BPH medications
Low Risk (score 7-11)

## 2024-10-04 ENCOUNTER — TELEPHONE (OUTPATIENT)
Dept: UROLOGY | Facility: CLINIC | Age: 89
End: 2024-10-04